# Patient Record
Sex: MALE | Race: WHITE | NOT HISPANIC OR LATINO | Employment: FULL TIME | URBAN - METROPOLITAN AREA
[De-identification: names, ages, dates, MRNs, and addresses within clinical notes are randomized per-mention and may not be internally consistent; named-entity substitution may affect disease eponyms.]

---

## 2017-01-30 ENCOUNTER — HOSPITAL ENCOUNTER (OUTPATIENT)
Dept: RADIOLOGY | Facility: HOSPITAL | Age: 37
Discharge: HOME | End: 2017-01-30
Attending: ORTHOPAEDIC SURGERY
Payer: COMMERCIAL

## 2017-01-30 DIAGNOSIS — M25.512 LEFT SHOULDER PAIN, UNSPECIFIED CHRONICITY: ICD-10-CM

## 2017-01-30 PROCEDURE — 73030 X-RAY EXAM OF SHOULDER: CPT | Mod: 26,LT,, | Performed by: RADIOLOGY

## 2017-01-30 PROCEDURE — 73030 X-RAY EXAM OF SHOULDER: CPT | Mod: TC,LT

## 2017-02-01 ENCOUNTER — OFFICE VISIT (OUTPATIENT)
Dept: ORTHOPEDICS | Facility: CLINIC | Age: 37
End: 2017-02-01
Payer: COMMERCIAL

## 2017-02-01 VITALS
WEIGHT: 172.81 LBS | HEART RATE: 90 BPM | DIASTOLIC BLOOD PRESSURE: 77 MMHG | SYSTOLIC BLOOD PRESSURE: 142 MMHG | HEIGHT: 69 IN | BODY MASS INDEX: 25.6 KG/M2 | RESPIRATION RATE: 16 BRPM

## 2017-02-01 DIAGNOSIS — M25.512 LEFT SHOULDER PAIN, UNSPECIFIED CHRONICITY: Primary | ICD-10-CM

## 2017-02-01 PROCEDURE — 20610 DRAIN/INJ JOINT/BURSA W/O US: CPT | Mod: LT,S$GLB,, | Performed by: PHYSICIAN ASSISTANT

## 2017-02-01 PROCEDURE — 99214 OFFICE O/P EST MOD 30 MIN: CPT | Mod: 25,S$GLB,, | Performed by: PHYSICIAN ASSISTANT

## 2017-02-01 PROCEDURE — 99999 PR PBB SHADOW E&M-EST. PATIENT-LVL III: CPT | Mod: PBBFAC,,, | Performed by: PHYSICIAN ASSISTANT

## 2017-02-01 RX ORDER — BETAMETHASONE SODIUM PHOSPHATE AND BETAMETHASONE ACETATE 3; 3 MG/ML; MG/ML
6 INJECTION, SUSPENSION INTRA-ARTICULAR; INTRALESIONAL; INTRAMUSCULAR; SOFT TISSUE
Status: COMPLETED | OUTPATIENT
Start: 2017-02-01 | End: 2017-02-01

## 2017-02-01 RX ADMIN — BETAMETHASONE SODIUM PHOSPHATE AND BETAMETHASONE ACETATE 6 MG: 3; 3 INJECTION, SUSPENSION INTRA-ARTICULAR; INTRALESIONAL; INTRAMUSCULAR; SOFT TISSUE at 04:02

## 2017-02-01 NOTE — PROGRESS NOTES
SUBJECTIVE:     Chief Complaint & History of Present Illness:  Juan Manuel Hdz is a  New  patient 36 y.o. male who is seen here today with a complaint of    Chief Complaint   Patient presents with    Left Shoulder - Pain, Injury    .  Patient reports she's developed some pain and soreness in the lateral aspect of his left shoulder over the past several weeks.  He does not remember a specific trauma or injury to the shoulder but does work out on a daily basis with both overhead work and resistive training.  He has not lost any specific function within the shoulder but does have tenderness and soreness particularly with movements and resisted range of motion over shoulder height  On a scale of 1-10, with 10 being worst pain imaginable, he rates this pain as 1 on good days and 4 on bad days.  he describes the pain as tender.    Review of patient's allergies indicates:  No Known Allergies      Current Outpatient Prescriptions   Medication Sig Dispense Refill    [START ON 2/7/2017] dextroamphetamine-amphetamine 10 mg Tab Take 10 mg by mouth 2 (two) times daily. 60 tablet 0     No current facility-administered medications for this visit.        Past Medical History   Diagnosis Date    ADHD (attention deficit hyperactivity disorder)     AR (allergic rhinitis)     History of psychiatric care     Psychiatric problem     Reactive airway disease     Therapy        Past Surgical History   Procedure Laterality Date    Closed reduction zygomatic arch fracture      Back surgery  2010       Vital Signs (Most Recent)  Vitals:    02/01/17 1617   BP: (!) 142/77   Pulse: 90   Resp: 16       Review of Systems:  ROS:  Constitutional: no fever or chills  Eyes: no visual changes  ENT: no nasal congestion or sore throat  Respiratory: no cough or shortness of breath  Cardiovascular: no chest pain or palpitations  Gastrointestinal: no nausea or vomiting, tolerating diet  Genitourinary: no hematuria or  "dysuria  Integument/Breast: no rash or pruritis  Hematologic/Lymphatic: no easy bruising or lymphadenopathy  Musculoskeletal: no arthralgias or myalgias  Neurological: no seizures or tremors  Behavioral/Psych: no auditory or visual hallucinations, ADHD  Endocrine: no heat or cold intolerance      OBJECTIVE:     PHYSICAL EXAM:  Height: 5' 9" (175.3 cm) Weight: 78.4 kg (172 lb 13.5 oz), General Appearance: Well nourished, well developed, in no acute distress.  Neurological: Mood & affect are normal.  Shoulder exam: left  Tenderness: posterior acromial  ROM: forward flexion 180/180, extension 45/45, full abduction 180/180, abduction-glenohumeral 90/90, external rotation 50/50  Shoulder Strength: biceps 5/5, triceps 5/5, abduction 5/5, adduction 5/5, external rotation 5/5 with shoulder at side, flexion 5/5, and extension 5/5  negative for tenderness about the glenohumeral joint, negative for tenderness over the acromioclavicular joint and negative for impingement sign  Stability tests: anterior apprehension test negative and posterior apprehension test negative  Special Tests:Bailey' test: negative, Cross-chest abduction: negative and Speed's test: negative                     RADIOGRAPHS:  X-rays taken today films reviewed by me demonstrate no evidence of fracture dislocation or other bony abnormality in or about the shoulder joint spaces are well-maintained no evidence of advanced degenerative joint disease    ASSESSMENT/PLAN:     Plan: We discussed with the patient at length all the different treatment options available for his leftshoulder including anti-inflammatories, acetaminophen, rest, ice, Physical therapy to include strengthening exercise, occasional cortisone injections for temporary relief, arthroscopic surgical repair, and finally shoulder arthroplasty.   Patient is opted to proceed with therapeutic diagnostic cortisone injection of left shoulder      The injection site was identified and the skin was " prepared with an ETOH solution. The    left  shoulder was injected with 1 ml of Celestone and 5 ml Lidocaine under sterile technique. Juan Manuelmikhail Rodriguez  tolerated the procedure well, he was advised to rest the  shoulder  today, ice and support. he did receive immediate relief of the pain in and about his  shoulder  he was told this would be short lived and is secondary to the lidocaine. he may have an increase in his discomfort tonight followed by steady improvement over the next several days. It may take 1-3 weeks following the injection to get the full benefit of the medication.  I will see him back in 3-6 months. Sooner if he has any problems or concerns.

## 2017-02-21 ENCOUNTER — OFFICE VISIT (OUTPATIENT)
Dept: ORTHOPEDICS | Facility: CLINIC | Age: 37
End: 2017-02-21
Payer: COMMERCIAL

## 2017-02-21 VITALS
RESPIRATION RATE: 16 BRPM | BODY MASS INDEX: 25.6 KG/M2 | DIASTOLIC BLOOD PRESSURE: 79 MMHG | HEART RATE: 64 BPM | HEIGHT: 69 IN | WEIGHT: 172.81 LBS | SYSTOLIC BLOOD PRESSURE: 133 MMHG

## 2017-02-21 DIAGNOSIS — M25.512 LEFT SHOULDER PAIN, UNSPECIFIED CHRONICITY: Primary | ICD-10-CM

## 2017-02-21 PROCEDURE — 99213 OFFICE O/P EST LOW 20 MIN: CPT | Mod: S$GLB,,, | Performed by: PHYSICIAN ASSISTANT

## 2017-02-21 PROCEDURE — 99999 PR PBB SHADOW E&M-EST. PATIENT-LVL III: CPT | Mod: PBBFAC,,, | Performed by: PHYSICIAN ASSISTANT

## 2017-02-21 PROCEDURE — 1160F RVW MEDS BY RX/DR IN RCRD: CPT | Mod: S$GLB,,, | Performed by: PHYSICIAN ASSISTANT

## 2017-02-21 RX ORDER — TRAMADOL HYDROCHLORIDE 50 MG/1
50 TABLET ORAL
Qty: 60 TABLET | Refills: 0 | Status: SHIPPED | OUTPATIENT
Start: 2017-02-21 | End: 2017-03-03

## 2017-02-21 NOTE — PROGRESS NOTES
SUBJECTIVE:     Chief Complaint & History of Present Illness:  Juan Manuel Hdz is a  Established  patient 36 y.o. male who is seen here today with a complaint of    Chief Complaint   Patient presents with    Left Shoulder - Follow-up, Pain    .  He is a patient well known to me was last seen treated in the clinic 2/01/2017 at which time he did undergone a therapeutic diagnostic cortisone injection of the left shoulder.  Patient reports he only received 1 week of relief from the injection but has had a return of symptoms to the same degree if not greater than prior to the injection  On a scale of 1-10, with 10 being worst pain imaginable, he rates this pain as 6 on good days and 8 on bad days.  he describes the pain as tender and sore.    Review of patient's allergies indicates:  No Known Allergies      Current Outpatient Prescriptions   Medication Sig Dispense Refill    dextroamphetamine-amphetamine 10 mg Tab Take 10 mg by mouth 2 (two) times daily. 60 tablet 0    tramadol (ULTRAM) 50 mg tablet Take 1 tablet (50 mg total) by mouth every 4 to 6 hours as needed for Pain. 60 tablet 0     No current facility-administered medications for this visit.        Past Medical History   Diagnosis Date    ADHD (attention deficit hyperactivity disorder)     AR (allergic rhinitis)     History of psychiatric care     Psychiatric problem     Reactive airway disease     Therapy        Past Surgical History   Procedure Laterality Date    Closed reduction zygomatic arch fracture      Back surgery  2010       Vital Signs (Most Recent)  Vitals:    02/21/17 1113   BP: 133/79   Pulse: 64   Resp: 16       Review of Systems:  ROS:  Constitutional: no fever or chills  Eyes: no visual changes  ENT: no nasal congestion or sore throat  Respiratory: no cough or shortness of breath  Cardiovascular: no chest pain or palpitations  Gastrointestinal: no nausea or vomiting, tolerating diet  Genitourinary: no hematuria or  "dysuria  Integument/Breast: no rash or pruritis  Hematologic/Lymphatic: no easy bruising or lymphadenopathy  Musculoskeletal: no arthralgias or myalgias  Neurological: no seizures or tremors  Behavioral/Psych: no auditory or visual hallucinations, ADHD  Endocrine: no heat or cold intolerance      OBJECTIVE:     PHYSICAL EXAM:  Height: 5' 9" (175.3 cm) Weight: 78.4 kg (172 lb 13.5 oz), General Appearance: Well nourished, well developed, in no acute distress.  Neurological: Mood & affect are normal.  Shoulder exam: left  Tenderness: posterior acromial  ROM: forward flexion 180/180 pain at the ends of range of motion, extension 45/45, full abduction 180/180 pain ends of range of motion, abduction-glenohumeral 90/90, external rotation 50/50  Shoulder Strength: biceps 5/5, triceps 5/5, abduction 5/5, adduction 5/5, external rotation 5/5 with shoulder at side, flexion 5/5, and extension 5/5  negative for tenderness about the glenohumeral joint, negative for tenderness over the acromioclavicular joint and negative for impingement sign  Stability tests: anterior apprehension test negative and posterior apprehension test negative                     ASSESSMENT/PLAN:     Plan: We discussed with the patient at length all the different treatment options available for his leftshoulder including anti-inflammatories, acetaminophen, rest, ice, Physical therapy to include strengthening exercise, occasional cortisone injections for temporary relief, arthroscopic surgical repair, and finally shoulder arthroplasty.   In light of the failed injection therapy we'll proceed with MRI of the shoulder we'll see him back following the MRI to discuss treatment options    "

## 2017-02-27 ENCOUNTER — HOSPITAL ENCOUNTER (OUTPATIENT)
Dept: RADIOLOGY | Facility: HOSPITAL | Age: 37
Discharge: HOME | End: 2017-02-27
Attending: ORTHOPAEDIC SURGERY
Payer: COMMERCIAL

## 2017-02-27 DIAGNOSIS — M25.512 LEFT SHOULDER PAIN, UNSPECIFIED CHRONICITY: ICD-10-CM

## 2017-02-27 PROCEDURE — 73221 MRI JOINT UPR EXTREM W/O DYE: CPT | Mod: TC,LT

## 2017-02-27 PROCEDURE — 73221 MRI JOINT UPR EXTREM W/O DYE: CPT | Mod: 26,LT,, | Performed by: RADIOLOGY

## 2017-03-01 ENCOUNTER — OFFICE VISIT (OUTPATIENT)
Dept: ORTHOPEDICS | Facility: CLINIC | Age: 37
End: 2017-03-01
Payer: COMMERCIAL

## 2017-03-01 VITALS
HEIGHT: 69 IN | RESPIRATION RATE: 16 BRPM | SYSTOLIC BLOOD PRESSURE: 129 MMHG | BODY MASS INDEX: 25.6 KG/M2 | DIASTOLIC BLOOD PRESSURE: 73 MMHG | WEIGHT: 172.81 LBS | HEART RATE: 68 BPM

## 2017-03-01 DIAGNOSIS — M25.512 LEFT SHOULDER PAIN, UNSPECIFIED CHRONICITY: Primary | ICD-10-CM

## 2017-03-01 PROCEDURE — 99213 OFFICE O/P EST LOW 20 MIN: CPT | Mod: S$GLB,,, | Performed by: PHYSICIAN ASSISTANT

## 2017-03-01 PROCEDURE — 99999 PR PBB SHADOW E&M-EST. PATIENT-LVL III: CPT | Mod: PBBFAC,,, | Performed by: PHYSICIAN ASSISTANT

## 2017-03-01 PROCEDURE — 1160F RVW MEDS BY RX/DR IN RCRD: CPT | Mod: S$GLB,,, | Performed by: PHYSICIAN ASSISTANT

## 2017-03-01 NOTE — PROGRESS NOTES
SUBJECTIVE:     Chief Complaint & History of Present Illness:  Juan Manuel Hdz is a  Established  patient 36 y.o. male who is seen here today with a complaint of    Chief Complaint   Patient presents with    Left Shoulder - Follow-up    .  His patient well known to me was last seen treated in the clinic 2/21/2017 at which time we had sent him for MRI to evaluate his continuing left shoulder pain.  He had failed cortisone injections prior to this area today review his MRI results and discuss treatment options  On a scale of 1-10, with 10 being worst pain imaginable, he rates this pain as 1 on good days and 5 on bad days.  he describes the pain as sore and achy.    Review of patient's allergies indicates:  No Known Allergies      Current Outpatient Prescriptions   Medication Sig Dispense Refill    dextroamphetamine-amphetamine 10 mg Tab Take 10 mg by mouth 2 (two) times daily. 60 tablet 0    tramadol (ULTRAM) 50 mg tablet Take 1 tablet (50 mg total) by mouth every 4 to 6 hours as needed for Pain. 60 tablet 0     No current facility-administered medications for this visit.        Past Medical History:   Diagnosis Date    ADHD (attention deficit hyperactivity disorder)     AR (allergic rhinitis)     History of psychiatric care     Psychiatric problem     Reactive airway disease     Therapy        Past Surgical History:   Procedure Laterality Date    BACK SURGERY  2010    CLOSED REDUCTION ZYGOMATIC ARCH FRACTURE         Vital Signs (Most Recent)  Vitals:    03/01/17 1131   BP: 129/73   Pulse: 68   Resp: 16       Review of Systems:  ROS:  Constitutional: no fever or chills  Eyes: no visual changes  ENT: no nasal congestion or sore throat  Respiratory: no cough or shortness of breath  Cardiovascular: no chest pain or palpitations  Gastrointestinal: no nausea or vomiting, tolerating diet  Genitourinary: no hematuria or dysuria  Integument/Breast: no rash or pruritis  Hematologic/Lymphatic: no easy  "bruising or lymphadenopathy  Musculoskeletal: no arthralgias or myalgias  Neurological: no seizures or tremors  Behavioral/Psych: no auditory or visual hallucinations, ADHD  Endocrine: no heat or cold intolerance         OBJECTIVE:     PHYSICAL EXAM:  Height: 5' 9" (175.3 cm) Weight: 78.4 kg (172 lb 13.5 oz), General Appearance: Well nourished, well developed, in no acute distress.  Neurological: Mood & affect are normal.  Shoulder exam: left  Tenderness: posterior acromial  ROM: forward flexion 180/180 pain at the ends of range of motion, extension 45/45, full abduction 180/180 pain ends of range of motion, abduction-glenohumeral 90/90, external rotation 50/50  Shoulder Strength: biceps 5/5, triceps 5/5, abduction 5/5, adduction 5/5, external rotation 5/5 with shoulder at side, flexion 5/5, and extension 5/5  negative for tenderness about the glenohumeral joint, negative for tenderness over the acromioclavicular joint and negative for impingement sign  Stability tests: anterior apprehension test negative and posterior apprehension test negative                   RADIOGRAPHS:  MRI demonstrates  Mild periarticular osseous edema about the AC joint.  Normal MRI evaluation of the rotator cuff and labrum.        ASSESSMENT/PLAN:     Plan: We discussed with the patient at length all the different treatment options available for his leftshoulder including anti-inflammatories, acetaminophen, rest, ice, Physical therapy to include strengthening exercise, occasional cortisone injections for temporary relief, arthroscopic surgical repair, and finally shoulder arthroplasty.   We'll proceed with the physical therapy of the left shoulder to include dry needling  I will see him back in 3 weeks to assess his progress in this modality he continues to have pain we'll consider repeat cortisone injection at that time    "

## 2017-03-08 ENCOUNTER — CLINICAL SUPPORT (OUTPATIENT)
Dept: REHABILITATION | Facility: HOSPITAL | Age: 37
End: 2017-03-08
Attending: ORTHOPAEDIC SURGERY
Payer: COMMERCIAL

## 2017-03-08 DIAGNOSIS — M25.512 ACUTE PAIN OF LEFT SHOULDER: Primary | ICD-10-CM

## 2017-03-08 PROCEDURE — 97161 PT EVAL LOW COMPLEX 20 MIN: CPT

## 2017-03-08 PROCEDURE — 97110 THERAPEUTIC EXERCISES: CPT

## 2017-03-08 NOTE — PROGRESS NOTES
[facility]   PATIENT EVALUATION    Date: 03/08/2017  Start Time: 11:30  Stop Time: 12:30    Patient Name: Juan Manuel Hdz  Clinic Number: [id number]  Age: 36 y.o.  Gender: male    Diagnosis:   Encounter Diagnosis   Name Primary?    Acute pain of left shoulder Yes       Referring Physician: Andrea Gleason MD  Treatment Orders: PT Eval and Treat      History     Past Medical History:   Diagnosis Date    ADHD (attention deficit hyperactivity disorder)     AR (allergic rhinitis)     History of psychiatric care     Psychiatric problem     Reactive airway disease     Therapy        Current Outpatient Prescriptions   Medication Sig    dextroamphetamine-amphetamine 10 mg Tab Take 10 mg by mouth 2 (two) times daily.     No current facility-administered medications for this visit.        Review of patient's allergies indicates:  No Known Allergies      Subjective     History of Present Condition: Pt reports that over the last 1.5 months he began with shoulder pain during lifting and overhead movements.     Onset Date: 1.5 mo  Date of Surgery: NA  Precautions: NA    Mechanism of Injury: insidious    Pain Location: shoulder   Pain Description: Sharp  Current Pain: 0/10  Least Pain: 0/10  Worst Pain: 8/10  Aggravating Factors: overhead motion, lifting, leaning on elbow  Relieving Factors: rest    Diagnostic Tests: MRI: Mild periarticular osseous edema about the AC joint.    Normal MRI evaluation of the rotator cuff and labrum.  Prior Therapy: Yes - none for shoulder    Occupation: drafting - works at desk    Sports/Recreational Activities: lifting, running  Extremity Dominance: R    Prior Level of Function: Independent  Functional Deficits Leading to Referral/Nature of Injury: pain with ADLs  Patient Therapy Goals: return to lifting pain free  Cultural/Environmental/Spiritual Barriers to Treatment or Learning: none      Objective     Observation: FHP, rounded shoulders  increased thoracic kyphosis    Palpation: mild TTP bicep tendon    Shoulder Range of Motion: ROM WNL B; B tightness of posterior capsule     Joint Mobility: normal    Strength:     Left:  Flex: 5.5  Abd: 3/5!  IR: 3/5!  ER: 4/5!    Right:  Flex: 5/5  Abd: 5/5  IR: 5/5  ER: 5/5    Scapular Control/Dyskinesis: + inferior angle winging    Special Tests: =  + Neers   + Speed      Other:       Treatment:     pec stretch over towel roll x 4'  IR?ER 3 x 10  No money 3 x 10  Scap retractions 3 x 10 x 10 sec    Functional Limitations Reports - G Codes  Category: Mobility  Tool: FOTO  Score: 53       Assessment     History  Co-morbidities and personal factors that may impact the plan of care Low    Stable Clinical Presentation   Co-morbidities:       Personal Factors:     Body regions    Body systems    Activity Limitations    Participation Restrictons   No personal factors and/ or  comorbidites          Address 1-2 elements:   Pain  Strength         This is a 36 y.o. male referred to outpatient physical therapy and presents with a medical diagnosis of left shoulder pain and demonstrates limitations as described in the problem list. Pt demonstrates good rehab potential. Pt will benefit from physcial therapy services in order to maximize pain free and/or functional use of left shoulder. The following goals were discussed with the patient and patient is in agreement with them as to be addressed in the treatment plan. Pt was given a HEP consisting of     pec stretch over towel roll x 4'  IR?ER 3 x 10  No money 3 x 10  Scap retractions 3 x 10 x 10 sec    . Pt verbally understood the instructions as they were given and demonstrated proper form and technique during therapy. Pt was advised to perform these exercises free of pain, and to stop performing them if pain occurs.     Medical necessity is demonstrated by the following problem list:   - Pain limits function of effected part for all activities  - Unable to participate in  daily activities   - Requires skilled supervision to complete and progress HEP  - Fall risk - impaired balance   - Continued inability to participate in vocational pursuits    Short Term Goals (5-6 Weeks):  - Pt will increase shoulder ER strength to 4/5  - Decrease Pain to 0 with lifting arm overhead  - Pt to self correct posture with scap retractions  - Pt independent with HEP with progressions.      Long Term Goals (10-12 Weeks):  - Pt will increase shoulder ER strength to 5/5  - Decrease Pain to 0 with lifting arm overhead with 10#  - Pt to improve FOTO score to > 72  - Pt to return to weightlifting with no increase in s.s      Plan     Pt will be treated by physical therapy 1-2 times a week for 10-12 weeks for manual therapy, therapeutic exercise, home exercise program, patient education, and modalities PRN to achieve established goals. Juan Manuel may at times be seen by a PTA as part of the Rehab Team.     Anel Block, PT, DPT  03/08/2017    I CERTIFY THE NEED FOR THESE SERVICES FURNISHED UNDER THIS PLAN OF TREATMENT AND WHILE UNDER MY CARE    Physician's comments: ________________________________________________________________________________________________________________________________________________    Physician's Name: ___________________________________

## 2017-03-13 ENCOUNTER — CLINICAL SUPPORT (OUTPATIENT)
Dept: REHABILITATION | Facility: HOSPITAL | Age: 37
End: 2017-03-13
Attending: ORTHOPAEDIC SURGERY
Payer: COMMERCIAL

## 2017-03-13 DIAGNOSIS — R53.1 WEAKNESS: Primary | ICD-10-CM

## 2017-03-13 PROCEDURE — 97140 MANUAL THERAPY 1/> REGIONS: CPT | Performed by: PHYSICAL THERAPIST

## 2017-03-13 PROCEDURE — 97110 THERAPEUTIC EXERCISES: CPT | Performed by: PHYSICAL THERAPIST

## 2017-03-13 NOTE — PROGRESS NOTES
" Outpatient Physical Therapy Progress Note     Time in: 11:00  Time out: 11:50  Ther ex time: 30 min    Visit # 2    Subjective:  Pt states L shld feels "OK" and rates pain as 0/10.  States pain only with certain movements.  States doing HEP as instructed.    Objective: Tight posterior capsule.  TTP biceps and middle deltoid muscles.    Treatment:  There ex and modalities for increased ROM/strength and improved ADL to include:  UBE x 6 min, theraband x 3 ways, theraband IR/ER side steps, prone rows with 5#, serratus punches with 5#, SL ER and HABD with 2#, PROM/stretching x 7 min, HEP review and DN to trigger points in lateral deltoid/biceps muscles.    Assessment:  No pain with exercise. Sx decreased after DN.     Will the patient continue to benefit from skilled PT intervention?     yes    Medical necessity is demonstrated by:   Pain limits function of effected part for all activities  Unable to participate fully in daily activities       Progress towards goals: fair    New/Revised Goals:    Plan:  Continue with established Plan of Care toward PT goals.          "

## 2017-03-20 ENCOUNTER — CLINICAL SUPPORT (OUTPATIENT)
Dept: REHABILITATION | Facility: HOSPITAL | Age: 37
End: 2017-03-20
Attending: ORTHOPAEDIC SURGERY
Payer: COMMERCIAL

## 2017-03-20 DIAGNOSIS — R52 PAIN: Primary | ICD-10-CM

## 2017-03-20 PROCEDURE — 97140 MANUAL THERAPY 1/> REGIONS: CPT | Performed by: PHYSICAL THERAPIST

## 2017-03-20 PROCEDURE — 97110 THERAPEUTIC EXERCISES: CPT | Performed by: PHYSICAL THERAPIST

## 2017-03-20 NOTE — PROGRESS NOTES
" Outpatient Physical Therapy Progress Note     Time in: 11:00  Time out: 11:50  Ther ex time: 30 min    Visit # 3    Subjective:  Pt states L shld feels "about the same" and rates pain as 0/10.  States doing HEP as instructed.    Objective: Tight posterior capsule.  TTP middle deltoid muscle.    Treatment:  There ex and modalities for increased ROM/strength and improved ADL to include:  UBE x 6 min, theraband x 3 ways, theraband IR/ER side steps, prone rows with 5#, serratus punches with 5#, SL ER and HABD with 2#, PROM/stretching x 8 min, HEP review and DN to trigger point in lateral deltoid.    Assessment:  No pain with exercise. Sx decreased after DN with palpation.     Will the patient continue to benefit from skilled PT intervention?     yes    Medical necessity is demonstrated by:   Pain limits function of effected part for all activities  Unable to participate fully in daily activities       Progress towards goals: fair    New/Revised Goals:    Plan:  Continue with established Plan of Care toward PT goals.          "

## 2017-03-23 ENCOUNTER — CLINICAL SUPPORT (OUTPATIENT)
Dept: REHABILITATION | Facility: HOSPITAL | Age: 37
End: 2017-03-23
Attending: ORTHOPAEDIC SURGERY
Payer: COMMERCIAL

## 2017-03-23 PROCEDURE — 97140 MANUAL THERAPY 1/> REGIONS: CPT

## 2017-03-23 PROCEDURE — 97110 THERAPEUTIC EXERCISES: CPT

## 2017-03-23 NOTE — PROGRESS NOTES
" Outpatient Physical Therapy Progress Note     Time in: 1230  Time out: 1330  Ther ex time: 40 min    Visit # 4    Subjective:  Pt reporting no pain when arrived for tx. Pain scale 0/10. Stated occasional pain L shld with "certain movements".  States doing HEP as instructed.    Objective: Tight posterior capsule.  TTP middle deltoid muscle.    Treatment:  There ex and modalities for increased ROM/strength and improved ADL to include:  UBE x 8 min, GTB x 4 ways, GTB IR/ER side steps, prone rows with 5#, serratus punches with 5#, SL ER and HABD with 2#, Pulleys -shld flex and scaption 30x  PROM/stretching x 10 min, HEP review and CP x 10    Assessment: Pt tolerating tx well. Min L shld mm fatigue after tx but w/o increased pain. VC/TC for correcting form/technique with exercise along with scapular engagement.      Will the patient continue to benefit from skilled PT intervention?     yes    Medical necessity is demonstrated by:   Pain limits function of effected part for all activities  Unable to participate fully in daily activities       Progress towards goals: fair    New/Revised Goals:    Plan:  Continue with established Plan of Care toward PT goals.          "

## 2017-03-27 ENCOUNTER — CLINICAL SUPPORT (OUTPATIENT)
Dept: REHABILITATION | Facility: HOSPITAL | Age: 37
End: 2017-03-27
Attending: ORTHOPAEDIC SURGERY
Payer: COMMERCIAL

## 2017-03-27 DIAGNOSIS — R53.1 WEAKNESS: Primary | ICD-10-CM

## 2017-03-27 PROCEDURE — 97110 THERAPEUTIC EXERCISES: CPT | Performed by: PHYSICAL THERAPIST

## 2017-03-30 ENCOUNTER — CLINICAL SUPPORT (OUTPATIENT)
Dept: REHABILITATION | Facility: HOSPITAL | Age: 37
End: 2017-03-30
Attending: ORTHOPAEDIC SURGERY
Payer: COMMERCIAL

## 2017-03-30 DIAGNOSIS — R53.1 WEAKNESS: Primary | ICD-10-CM

## 2017-03-30 PROCEDURE — 97140 MANUAL THERAPY 1/> REGIONS: CPT

## 2017-03-30 PROCEDURE — 97110 THERAPEUTIC EXERCISES: CPT

## 2017-03-30 NOTE — PROGRESS NOTES
" Outpatient Physical Therapy Progress Note     Time in: 1030  Time out: 1130  Ther ex time: 35 min    Visit # 6    Subjective:  Pt states L shld pain with "occasional movements" and rates pain as 5/10.  States doing HEP as instructed. L shld pain scale after tx 2/10.    Objective: Tight posterior capsule.  TTP middle deltoid muscle.    Treatment:  There ex and modalities for increased ROM/strength and improved ADL to include:  UBE x 8 min, Pulleys,  theraband x 3 ways, theraband IR/ER side steps, prone rows with 5#, serratus punches with 5#, prone ext 90-0 with 3#, theraband clock, ER ball drop/catch, wall slides flex and abd, wall walks with theraband np, manual co-contractions 2 min, SL ER and HABD with 2# np, PROM/stretching/DTM-cross friction bicep tendon x 15 min, HEP review and CP x 10 min.    Assessment:  Pt tolerating tx well. Increased pain with DTM L bicep tendon but with noted decreased pain after tx. VC/TC for correcting form/technique with exercise      Will the patient continue to benefit from skilled PT intervention?     yes    Medical necessity is demonstrated by:   Pain limits function of effected part for all activities  Unable to participate fully in daily activities       Progress towards goals: fair    New/Revised Goals:    Plan:  Continue with established Plan of Care toward PT goals.          "

## 2017-04-03 ENCOUNTER — CLINICAL SUPPORT (OUTPATIENT)
Dept: REHABILITATION | Facility: HOSPITAL | Age: 37
End: 2017-04-03
Attending: ORTHOPAEDIC SURGERY
Payer: COMMERCIAL

## 2017-04-03 DIAGNOSIS — R52 PAIN: Primary | ICD-10-CM

## 2017-04-03 PROCEDURE — 97110 THERAPEUTIC EXERCISES: CPT | Performed by: PHYSICAL THERAPIST

## 2017-04-03 PROCEDURE — 97140 MANUAL THERAPY 1/> REGIONS: CPT | Performed by: PHYSICAL THERAPIST

## 2017-04-03 NOTE — PROGRESS NOTES
" Outpatient Physical Therapy Progress Note     Time in: 11:00  Time out: 11:55  Ther ex time: 40 min    Visit # 7    Subjective:  Pt states L shld feels "about the same" and rates pain as 0/10.  States doing HEP as instructed.    Objective: Tight posterior capsule.  TTP middle deltoid muscle.    Treatment:  There ex and modalities for increased ROM/strength and improved ADL to include:  UBE x 6 min, theraband x 3 ways, theraband IR/ER side steps, prone rows with 5#, serratus punches with 5#, prone ext 90-0 with 3#, theraband clock, ER ball drop/catch, wall walks with theraband, manual co-contractions, SL ER and HABD with 2#, PROM/stretching x 5 min, DN middle deltoid trigger point, kinesio tape for L biceps inhibition with i strip and 30% tension and HEP review.    Assessment:  No pain with treatment,     Will the patient continue to benefit from skilled PT intervention?     yes    Medical necessity is demonstrated by:   Pain limits function of effected part for all activities  Unable to participate fully in daily activities       Progress towards goals: fair    New/Revised Goals:    Plan:  Continue with established Plan of Care toward PT goals.          "

## 2017-04-06 ENCOUNTER — CLINICAL SUPPORT (OUTPATIENT)
Dept: REHABILITATION | Facility: HOSPITAL | Age: 37
End: 2017-04-06
Attending: ORTHOPAEDIC SURGERY
Payer: COMMERCIAL

## 2017-04-06 ENCOUNTER — OFFICE VISIT (OUTPATIENT)
Dept: PSYCHIATRY | Facility: CLINIC | Age: 37
End: 2017-04-06
Payer: COMMERCIAL

## 2017-04-06 VITALS — BODY MASS INDEX: 26.36 KG/M2 | HEIGHT: 69 IN | RESPIRATION RATE: 15 BRPM | WEIGHT: 178 LBS

## 2017-04-06 DIAGNOSIS — R52 PAIN: Primary | ICD-10-CM

## 2017-04-06 DIAGNOSIS — F90.0 ATTENTION DEFICIT HYPERACTIVITY DISORDER (ADHD), PREDOMINANTLY INATTENTIVE TYPE: ICD-10-CM

## 2017-04-06 PROCEDURE — 1160F RVW MEDS BY RX/DR IN RCRD: CPT | Mod: S$GLB,,, | Performed by: PSYCHIATRY & NEUROLOGY

## 2017-04-06 PROCEDURE — 99214 OFFICE O/P EST MOD 30 MIN: CPT | Mod: S$GLB,,, | Performed by: PSYCHIATRY & NEUROLOGY

## 2017-04-06 PROCEDURE — 97110 THERAPEUTIC EXERCISES: CPT

## 2017-04-06 PROCEDURE — 97140 MANUAL THERAPY 1/> REGIONS: CPT

## 2017-04-06 PROCEDURE — 99999 PR PBB SHADOW E&M-EST. PATIENT-LVL III: CPT | Mod: PBBFAC,,, | Performed by: PSYCHIATRY & NEUROLOGY

## 2017-04-06 RX ORDER — DEXTROAMPHETAMINE SACCHARATE, AMPHETAMINE ASPARTATE, DEXTROAMPHETAMINE SULFATE AND AMPHETAMINE SULFATE 2.5; 2.5; 2.5; 2.5 MG/1; MG/1; MG/1; MG/1
10 TABLET ORAL 2 TIMES DAILY
Qty: 60 TABLET | Refills: 0 | Status: SHIPPED | OUTPATIENT
Start: 2017-04-06 | End: 2017-04-06 | Stop reason: SDUPTHER

## 2017-04-06 RX ORDER — DEXTROAMPHETAMINE SACCHARATE, AMPHETAMINE ASPARTATE, DEXTROAMPHETAMINE SULFATE AND AMPHETAMINE SULFATE 2.5; 2.5; 2.5; 2.5 MG/1; MG/1; MG/1; MG/1
10 TABLET ORAL 2 TIMES DAILY
Qty: 60 TABLET | Refills: 0 | Status: SHIPPED | OUTPATIENT
Start: 2017-05-06 | End: 2017-04-06 | Stop reason: SDUPTHER

## 2017-04-06 RX ORDER — DEXTROAMPHETAMINE SACCHARATE, AMPHETAMINE ASPARTATE, DEXTROAMPHETAMINE SULFATE AND AMPHETAMINE SULFATE 2.5; 2.5; 2.5; 2.5 MG/1; MG/1; MG/1; MG/1
10 TABLET ORAL 2 TIMES DAILY
Qty: 60 TABLET | Refills: 0 | Status: SHIPPED | OUTPATIENT
Start: 2017-06-06 | End: 2018-02-08 | Stop reason: SDUPTHER

## 2017-04-06 NOTE — PROGRESS NOTES
" Outpatient Physical Therapy Progress Note     Time in: 1240  Time out: 1330  Ther ex time: 40 min    Visit # 8    Subjective:  Pt reporting no pain when arriving for tx but c/o pain L UE shld abd > 90' with ER. Pain scale 0/10.  States doing HEP as instructed.    Objective: Tight posterior capsule.  TTP middle deltoid muscle.    Treatment:  There ex and modalities for increased ROM/strength and improved ADL to include:  UBE x 6 min pro/retract, PROM/stretching x 10 min, L UE sleeper stretch 3x/30", BTB x 3 ways,  BTB ER/IR, BTB IR/ER side steps,  BTB truck ext L shld ext, prone rows with 5#, serratus punches with 5#, prone ext 90-0 with 3#, theraband clock, ER ball drop/catch, wall walks with theraband, manual co-contractions, SL ER and HABD with 2#, PROM/stretching x 5 min, and CP x 10.    Assessment:  Pt tolerating tx well. No pain during tx.  VC/TC for correcting form/technique along with scapular engagement and stability.      Will the patient continue to benefit from skilled PT intervention?     yes    Medical necessity is demonstrated by:   Pain limits function of effected part for all activities  Unable to participate fully in daily activities       Progress towards goals: fair    New/Revised Goals:    Plan:  Continue with established Plan of Care toward PT goals.          "

## 2017-04-06 NOTE — PROGRESS NOTES
ESTABLISHED OUTPATIENT VISIT   E/M LEVEL 4: [id number]    ENCOUNTER DATE: 2017  SITE: [facility], [facility]      HISTORY    CHIEF COMPLAINT   Juan Manuel Hdz is a 36 y.o. male who presents for followup of   Chief Complaint   Patient presents with    Inattention    Distractibilty       HPI   (Elements: Location, Quality, Severity, Duration, Timing, Content, Modifying Factors, Associated Signs & Symptoms)    Patient with history of ADHD, here for disease maintenance.  He is here for follow up appointment.  He was last seen in dec 2016 after long hiatus - off adderall with worsening distraction, procrastination at work.  We restarted adderall.  He filled 1 month but then got distracted and didn't refill timely, then prescriptions .  So he is off adderall again at this time.  No problems when on adderall - no cardiac issues, depression, insomnia, appetite/weight loss, tics/tremors.  No change in alcohol use - remains social.  Work is fine.  He recently suffered a breakup and was upset for sometime - but now over this.  He has had some musculoskeletal issues and is in physical rehab.    16 - off adderall for some time, worsening procrastination/distraction at work, adderall restarted 10mg bid  10/8/15 - stable, no med changes  7/10/15 - seen by resident, stable, no med changes  14 - stable, no med changes    ROS   Cardiovascular ROS: negative for - chest pain/palpitations   Respiratory ROS: no shortness of breath  Musculoskeletal ROS: shoulder pain      PFSH  Past Medical History reviewed: Yes  Family History reviewed: Yes  Social History reviewed: No      PSYCHOTROPIC MEDICATIONS   Adderall 10mg bid - currently out    EXAMINATION    VITALS   Most recent vital signs, dated less than 90 days prior to this appointment, were reviewed.   Vitals:    17 1746   Resp: 15       CONSTITUTIONAL  General Appearance: stated age, casually dressed    MUSCULOSKELETAL  Muscle Strength and  "Tone: no weakness or spasticity  Abnormal Involuntary Movements: no tremor noted  Gait and Station: ambulates without assistance    PSYCHIATRIC   Level of Consciousness: alert  Orientation: to person, place, time  Grooming: well groomed  Psychomotor Behavior: no retardation or agitation  Speech: conversational  Language: fluent english  Mood: "fine"  Affect: euthymic, pleasant, reactive  Thought Process: linear  Associations: intact, no loosening of associations  Thought Content: no SI  Memory: intact  Attention: mild distractibility noted  Fund of Knowledge: intact  Insight: intact  Judgment: intact    MEDICAL DECISION MAKING    DIAGNOSES  ADHD    PROBLEM LIST AND MANAGEMENT PLANS    New problem(s) (3 points each):   - none    Established problem(s), worsening (2 points each):   - none    Established problem(s), stable/improved (1 point each):  - ADHD.  restart Adderall at previous dose.  [place] monitoring site checked - no refills in past year.  - palpitations.  An issue in fall 2013 but not currently.  Cardiology cleared him to take stimulants.  QTc wnl.  - work/relationship problems.  More intensive psychotherapy could help address this - psychoeducation provided.  We did discuss during session.        PRESCRIPTION DRUG MANAGEMENT  Compliance: currently taking meds as prescribed  Side Effects: none  Regimen Adjustments: cont Adderall 10mg bid      DIAGNOSTIC TESTING  EKG 11/13/13 - QTc 397    David Kelly"

## 2017-05-15 ENCOUNTER — TELEPHONE (OUTPATIENT)
Dept: DERMATOLOGY | Facility: CLINIC | Age: 37
End: 2017-05-15

## 2017-05-15 NOTE — TELEPHONE ENCOUNTER
----- Message from Elicia Hernandez sent at 5/15/2017 11:33 AM CDT -----  Contact: Self  Pt is trying to get in to see a Dermatologist on [facility].  His face is red and itchy.  First available on [facility] is July.  He has never had this before.  Please call him at [phone].

## 2017-05-30 ENCOUNTER — OFFICE VISIT (OUTPATIENT)
Dept: DERMATOLOGY | Facility: CLINIC | Age: 37
End: 2017-05-30
Payer: COMMERCIAL

## 2017-05-30 DIAGNOSIS — L21.9 SEBORRHEIC DERMATITIS: Primary | ICD-10-CM

## 2017-05-30 PROCEDURE — 99999 PR PBB SHADOW E&M-EST. PATIENT-LVL II: CPT | Mod: PBBFAC,,, | Performed by: DERMATOLOGY

## 2017-05-30 PROCEDURE — 99202 OFFICE O/P NEW SF 15 MIN: CPT | Mod: S$GLB,,, | Performed by: DERMATOLOGY

## 2017-05-30 RX ORDER — FLUCONAZOLE 200 MG/1
TABLET ORAL
Qty: 8 TABLET | Refills: 0 | Status: SHIPPED | OUTPATIENT
Start: 2017-05-30 | End: 2018-05-30

## 2017-05-30 RX ORDER — HYDROCORTISONE BUTYRATE 1 MG/ML
LOTION TOPICAL
Qty: 1 BOTTLE | Refills: 3 | Status: SHIPPED | OUTPATIENT
Start: 2017-05-30 | End: 2018-05-30

## 2017-10-19 ENCOUNTER — OFFICE VISIT (OUTPATIENT)
Dept: PSYCHIATRY | Facility: CLINIC | Age: 37
End: 2017-10-19
Payer: COMMERCIAL

## 2017-10-19 VITALS
HEIGHT: 69 IN | DIASTOLIC BLOOD PRESSURE: 68 MMHG | HEART RATE: 48 BPM | SYSTOLIC BLOOD PRESSURE: 141 MMHG | BODY MASS INDEX: 27.01 KG/M2 | WEIGHT: 182.38 LBS

## 2017-10-19 DIAGNOSIS — F90.0 ATTENTION DEFICIT HYPERACTIVITY DISORDER (ADHD), PREDOMINANTLY INATTENTIVE TYPE: Primary | ICD-10-CM

## 2017-10-19 PROCEDURE — 99999 PR PBB SHADOW E&M-EST. PATIENT-LVL II: CPT | Mod: PBBFAC,,, | Performed by: NURSE PRACTITIONER

## 2017-10-19 PROCEDURE — 99213 OFFICE O/P EST LOW 20 MIN: CPT | Mod: S$GLB,,, | Performed by: NURSE PRACTITIONER

## 2017-10-19 NOTE — PROGRESS NOTES
Outpatient Psychiatry Follow-Up Visit (MD/NP)    10/19/2017    Clinical Status of Patient:  Outpatient (Ambulatory)    Chief Complaint:  Juan Manuel Hdz is a 36 y.o. male who presents today for follow-up of attention problems.  Met with patient.      Interval History and Content of Current Session:  Interim Events/Subjective Report/Content of Current Session: Mr Hdz arrived on time for his appointment.  He was appropriately dressed.  Appearance neat and clean.  He stated that he had run out of his medications and was having difficulty with focus lately.  He was last seen in April 2017 by Dr Kelly.  Medication working well to control focus and distractibility.  No c/o side effects.  No stressors currently.  Mood good.  Denies SI/HI, denies A/V hallucinations.      Psychotherapy:  · Target symptoms: distractability, lack of focus  · Why chosen therapy is appropriate versus another modality: relevant to diagnosis, patient responds to this modality, evidence based practice  · Outcome monitoring methods: self-report, observation  · Therapeutic intervention type: supportive psychotherapy  · Topics discussed/themes: symptom recognition  · The patient's response to the intervention is accepting. The patient's progress toward treatment goals is excellent.   · Duration of intervention: 15 minutes.    Review of Systems   GENERAL: No weight gain or loss   SKIN: No rashes or lacerations   HEAD: No headaches   EYES: No exophthalmos, jaundice or blindness   EARS: No dizziness, tinnitus or hearing loss   NOSE: No changes in smell   MOUTH & THROAT: No dyskinetic movements or obvious goiter   CHEST: No shortness of breath, hyperventilation or cough   CARDIOVASCULAR: No tachycardia or chest pain   ABDOMEN: No nausea, vomiting, pain, constipation or diarrhea   URINARY: No frequency, dysuria or sexual dysfunction   ENDOCRINE: No polydipsia, polyuria   MUSCULOSKELETAL: No pain or stiffness of the joints   NEUROLOGIC: No  "weakness, sensory changes, seizures, confusion, memory loss, tremor or other abnormal movements      Psychiatric Review Of Systems:  sleep: no  appetite changes: no  weight changes: no  energy/anergy: no  interest/pleasure/anhedonia: no  somatic symptoms: no  libido: not assessed  anxiety/panic: no      Past Medical, Family and Social History: The patient's past medical, family and social history have been reviewed and updated as appropriate within the electronic medical record - see encounter notes.    Compliance: no, Patient has run out of medication and forgetten to make follow-up appointments on a few occasions    Side effects: None    Risk Parameters:  Patient reports no suicidal ideation  Patient reports no homicidal ideation  Patient reports no self-injurious behavior  Patient reports no violent behavior    Exam (detailed: at least 9 elements; comprehensive: all 15 elements)   Constitutional  Vitals:  Most recent vital signs, dated less than 90 days prior to this appointment, were reviewed.   Vitals:    10/19/17 1110   BP: (!) 141/68   Pulse: (!) 48   Weight: 82.7 kg (182 lb 6.4 oz)   Height: 5' 9" (1.753 m)        General:  unremarkable, age appropriate     Musculoskeletal  Muscle Strength/Tone:  not examined   Gait & Station:  non-ataxic     Psychiatric  Speech:  no latency; no press   Mood & Affect:  happy  congruent and appropriate   Thought Process:  normal and logical   Associations:  intact   Thought Content:  normal, no suicidality, no homicidality, delusions, or paranoia   Insight:  intact   Judgement: behavior is adequate to circumstances   Orientation:  grossly intact   Memory: intact for content of interview   Language: grossly intact   Attention Span & Concentration:  able to focus   Fund of Knowledge:  intact and appropriate to age and level of education     Assessment and Diagnosis   Status/Progress: Based on the examination today, the patient's problem(s) is/are well controlled.  New problems " have not been presented today.   Co-morbidities, Diagnostic uncertainty and Lack of compliance are not complicating management of the primary condition.  There are no active rule-out diagnoses for this patient at this time.     General Impression:     Attention Deficit Hyperactivity Disorder, Primarily Inattentive Type    Intervention/Counseling/Treatment Plan   · Medication Management: The risks and benefits of medication were discussed with the patient.   · Resume Adderall 10 mg Bid as patient had run out recently      Return to Clinic: 3 months

## 2017-10-21 ENCOUNTER — OFFICE VISIT (OUTPATIENT)
Dept: URGENT CARE | Facility: CLINIC | Age: 37
End: 2017-10-21
Payer: COMMERCIAL

## 2017-10-21 VITALS
HEART RATE: 71 BPM | SYSTOLIC BLOOD PRESSURE: 127 MMHG | OXYGEN SATURATION: 97 % | TEMPERATURE: 97 F | DIASTOLIC BLOOD PRESSURE: 54 MMHG

## 2017-10-21 DIAGNOSIS — H00.014 HORDEOLUM EXTERNUM OF LEFT UPPER EYELID: ICD-10-CM

## 2017-10-21 DIAGNOSIS — H10.9 CONJUNCTIVITIS, UNSPECIFIED CONJUNCTIVITIS TYPE, UNSPECIFIED LATERALITY: Primary | ICD-10-CM

## 2017-10-21 PROCEDURE — 99213 OFFICE O/P EST LOW 20 MIN: CPT | Mod: S$GLB,,, | Performed by: PHYSICIAN ASSISTANT

## 2017-10-21 RX ORDER — ERYTHROMYCIN 5 MG/G
OINTMENT OPHTHALMIC
Qty: 1 TUBE | Refills: 0 | Status: SHIPPED | OUTPATIENT
Start: 2017-10-21 | End: 2018-05-30

## 2017-10-21 NOTE — PROGRESS NOTES
Subjective:       Patient ID: Juan Manuel Hdz is a 36 y.o. male.    Vitals:  temperature is 97.4 °F (36.3 °C). His blood pressure is 127/54 (abnormal) and his pulse is 71. His oxygen saturation is 97%.     Chief Complaint: Eye Pain (left eye redness, itching, pain)    Pt. Complains of left eye redness, swelling, pain and itching for the past two days.      Eye Pain    The left eye is affected. This is a new problem. The current episode started yesterday. The problem occurs constantly. The problem has been gradually worsening. There was no injury mechanism. The pain is at a severity of 3/10. The pain is mild. There is no known exposure to pink eye. He does not wear contacts. Associated symptoms include eye redness, a foreign body sensation and itching. Pertinent negatives include no blurred vision, fever, nausea or vomiting. He has tried nothing for the symptoms. The treatment provided mild relief.     Review of Systems   Constitution: Negative for chills and fever.   HENT: Negative for congestion and sore throat.    Eyes: Positive for itching, pain and redness. Negative for blurred vision.   Cardiovascular: Negative for chest pain.   Respiratory: Negative for shortness of breath.    Skin: Negative for rash.   Musculoskeletal: Negative for joint pain.   Gastrointestinal: Negative for abdominal pain, diarrhea, nausea and vomiting.   Genitourinary: Negative for dysuria.   Neurological: Negative for dizziness and focal weakness.   Psychiatric/Behavioral: Negative for depression.       Objective:      Physical Exam   Constitutional: He is oriented to person, place, and time. He appears well-developed and well-nourished. No distress.   HENT:   Head: Normocephalic and atraumatic.   Eyes: EOM are normal. Pupils are equal, round, and reactive to light. Left conjunctiva is injected.   Slit lamp exam:       The left eye shows no corneal abrasion, no corneal ulcer and no fluorescein uptake.   Neck: Normal range of motion.  Neck supple.   Cardiovascular: Normal rate and regular rhythm.  Exam reveals no gallop and no friction rub.    No murmur heard.  Pulmonary/Chest: Effort normal and breath sounds normal. He has no wheezes. He has no rales.   Musculoskeletal: Normal range of motion.   Neurological: He is alert and oriented to person, place, and time.   Skin: Skin is warm and dry. No rash noted. No erythema.   Psychiatric: He has a normal mood and affect. His behavior is normal. Judgment and thought content normal.   Nursing note and vitals reviewed.      Assessment:       1. Conjunctivitis, unspecified conjunctivitis type, unspecified laterality    2. Hordeolum externum of left upper eyelid        Plan:         Conjunctivitis, unspecified conjunctivitis type, unspecified laterality  -     erythromycin (ROMYCIN) ophthalmic ointment; Apply 1/2 inch ribbon to the inner lower eyelid and then gently massage onto the eye with the eye closed.  Dispense: 1 Tube; Refill: 0    Hordeolum externum of left upper eyelid  -     erythromycin (ROMYCIN) ophthalmic ointment; Apply 1/2 inch ribbon to the inner lower eyelid and then gently massage onto the eye with the eye closed.  Dispense: 1 Tube; Refill: 0      Juan Manuel was seen today for eye pain.    Diagnoses and all orders for this visit:    Conjunctivitis, unspecified conjunctivitis type, unspecified laterality  -     erythromycin (ROMYCIN) ophthalmic ointment; Apply 1/2 inch ribbon to the inner lower eyelid and then gently massage onto the eye with the eye closed.    Hordeolum externum of left upper eyelid  -     erythromycin (ROMYCIN) ophthalmic ointment; Apply 1/2 inch ribbon to the inner lower eyelid and then gently massage onto the eye with the eye closed.      Patient Instructions   - Rest.    - Drink plenty of fluids.    - Tylenol or Ibuprofen as directed as needed for fever/pain.    - Warm compresses to the affected area for 20 minutes at a time.   - Follow up with your PCP or specialty  clinic as directed in the next 1-2 weeks if not improved or as needed.  You can call [phone] to schedule an appointment with the appropriate provider.    - Go to the ED if your symptoms worsen.    Conjunctivitis, Nonspecific    The membrane that covers the white part of your eye (the conjunctiva) is inflamed. Inflammation happens when your body responds to an injury, allergic reaction, infection, or illness. Symptoms of inflammation in the eye may include redness, irritation, itching, swelling, or burning. These symptoms should go away within the next 24 hours. Conjunctivitis may be related to a particle that was in your eye. If so, it may wash out with your tears or irrigation treatment. Being exposed to liquid chemicals or fumes may also cause this reaction.   Home care  · Apply a cold pack (ice in a plastic bag, wrapped in a towel) over the eye for 20 minutes at a time. This will reduce pain.  · Artificial tears may be prescribed to reduce irritation or redness.  These should be used 3 to 4 times a day.  · You may use acetaminophen or ibuprofen to control pain, unless another medicine was prescribed.(Note: If you have chronic liver or kidney disease, or if you have ever had a stomach ulcer or gastrointestinal bleeding, talk with your healthcare provider before using these medicines.)  Follow-up care  Follow up with your healthcare provider, or as advised.  When to seek medical advice  Call your healthcare provider right away if any of these occur:  · Increased eyelid swelling  · Increased eye pain  · Increased redness or drainage from the eye  · Increased blurry vision or increased sensitivity to light  · Failure of normal vision to return within 24 to 48 hours  Date Last Reviewed: 6/14/2015 © 2000-2017 [employer]. [address] [place] [address], [place] [address]. All rights reserved. This information is not intended as a substitute for professional medical care. Always follow your healthcare  professional's instructions.        Sty (or Stye)  A sty is an infection of the oil gland of the eyelid. It may develop into a small pocket of pus (abscess). This can cause pain, redness, and swelling. In early stages, styes are treated with antibiotic cream, eye drops, or warm packs (small towels soaked in warm water). More severe cases may need to be opened and drained by a health care provider.  Home care  · Eye drops or ointment are usually prescribed to treat the infection. Use these as directed.   ¨ Artificial tears may also be used to lubricate the eye and make it more comfortable. These may be purchased without a prescription.   ¨ Talk to your health care provider before using any over-the-counter treatment for a sty.  · Apply a warm, damp towel to the affected eye for at least 5 minutes, 3 to 4 times a day for a week. Warm compresses open the pores and speed the healing. If the compresses are too hot, they may burn your eyelid.  · Sometimes the sty will drain with this treatment alone. If this happens, continue the antibiotic until all the redness and swelling are gone.  · Wash your hands before and after touching the infected eye to avoid spreading the infection.  · Do not squeeze or try to puncture the sty.  Follow-up care  Follow up with your health care provider, or as advised.   When to seek medical advice  Call your health care provider right away if you have:  · Increase in swelling or redness around the eyelid after 48 to 72 hours  · Increase in eye pain or the eyelid blisters  · Increase in warmth--the eyelid feels hot  · Drainage of blood or thick pus from the sty  · Blister on the eyelid  · Inability to open the eyelid due to swelling  · Fever  ¨ 1 degree above your normal temperature lasting for 24 to 48 hours, or  ¨ Whatever your health care provider told you to report based on your medical condition  · Vision changes  · Headache or stiff neck  · Recurrence of the sty  Date Last Reviewed:  6/14/2015  © 4226-2796 [employer] [address] [place] [address], [place] [address]. All rights reserved. This information is not intended as a substitute for professional medical care. Always follow your healthcare professional's instructions.

## 2017-10-21 NOTE — PATIENT INSTRUCTIONS
- Rest.    - Drink plenty of fluids.    - Tylenol or Ibuprofen as directed as needed for fever/pain.    - Warm compresses to the affected area for 20 minutes at a time.   - Follow up with your PCP or specialty clinic as directed in the next 1-2 weeks if not improved or as needed.  You can call [phone] to schedule an appointment with the appropriate provider.    - Go to the ED if your symptoms worsen.    Conjunctivitis, Nonspecific    The membrane that covers the white part of your eye (the conjunctiva) is inflamed. Inflammation happens when your body responds to an injury, allergic reaction, infection, or illness. Symptoms of inflammation in the eye may include redness, irritation, itching, swelling, or burning. These symptoms should go away within the next 24 hours. Conjunctivitis may be related to a particle that was in your eye. If so, it may wash out with your tears or irrigation treatment. Being exposed to liquid chemicals or fumes may also cause this reaction.   Home care  · Apply a cold pack (ice in a plastic bag, wrapped in a towel) over the eye for 20 minutes at a time. This will reduce pain.  · Artificial tears may be prescribed to reduce irritation or redness.  These should be used 3 to 4 times a day.  · You may use acetaminophen or ibuprofen to control pain, unless another medicine was prescribed.(Note: If you have chronic liver or kidney disease, or if you have ever had a stomach ulcer or gastrointestinal bleeding, talk with your healthcare provider before using these medicines.)  Follow-up care  Follow up with your healthcare provider, or as advised.  When to seek medical advice  Call your healthcare provider right away if any of these occur:  · Increased eyelid swelling  · Increased eye pain  · Increased redness or drainage from the eye  · Increased blurry vision or increased sensitivity to light  · Failure of normal vision to return within 24 to 48 hours  Date Last Reviewed: 6/14/2015  ©  6462-1385 [employer]. [address] [place] [address], [place] [address]. All rights reserved. This information is not intended as a substitute for professional medical care. Always follow your healthcare professional's instructions.        Sty (or Stye)  A sty is an infection of the oil gland of the eyelid. It may develop into a small pocket of pus (abscess). This can cause pain, redness, and swelling. In early stages, styes are treated with antibiotic cream, eye drops, or warm packs (small towels soaked in warm water). More severe cases may need to be opened and drained by a health care provider.  Home care  · Eye drops or ointment are usually prescribed to treat the infection. Use these as directed.   ¨ Artificial tears may also be used to lubricate the eye and make it more comfortable. These may be purchased without a prescription.   ¨ Talk to your health care provider before using any over-the-counter treatment for a sty.  · Apply a warm, damp towel to the affected eye for at least 5 minutes, 3 to 4 times a day for a week. Warm compresses open the pores and speed the healing. If the compresses are too hot, they may burn your eyelid.  · Sometimes the sty will drain with this treatment alone. If this happens, continue the antibiotic until all the redness and swelling are gone.  · Wash your hands before and after touching the infected eye to avoid spreading the infection.  · Do not squeeze or try to puncture the sty.  Follow-up care  Follow up with your health care provider, or as advised.   When to seek medical advice  Call your health care provider right away if you have:  · Increase in swelling or redness around the eyelid after 48 to 72 hours  · Increase in eye pain or the eyelid blisters  · Increase in warmth--the eyelid feels hot  · Drainage of blood or thick pus from the sty  · Blister on the eyelid  · Inability to open the eyelid due to swelling  · Fever  ¨ 1 degree above your normal temperature  lasting for 24 to 48 hours, or  ¨ Whatever your health care provider told you to report based on your medical condition  · Vision changes  · Headache or stiff neck  · Recurrence of the sty  Date Last Reviewed: 6/14/2015  © 5552-2898 [employer]. [address] [place] [address], [place] [address]. All rights reserved. This information is not intended as a substitute for professional medical care. Always follow your healthcare professional's instructions.

## 2017-11-21 ENCOUNTER — ANESTHESIA (OUTPATIENT)
Dept: SURGERY | Facility: HOSPITAL | Age: 37
End: 2017-11-21
Payer: COMMERCIAL

## 2017-11-21 ENCOUNTER — TELEPHONE (OUTPATIENT)
Dept: SURGERY | Facility: CLINIC | Age: 37
End: 2017-11-21

## 2017-11-21 ENCOUNTER — ANESTHESIA EVENT (OUTPATIENT)
Dept: SURGERY | Facility: HOSPITAL | Age: 37
End: 2017-11-21
Payer: COMMERCIAL

## 2017-11-21 ENCOUNTER — HOSPITAL ENCOUNTER (OUTPATIENT)
Facility: HOSPITAL | Age: 37
Discharge: HOME | End: 2017-11-22
Attending: EMERGENCY MEDICINE | Admitting: SURGERY
Payer: COMMERCIAL

## 2017-11-21 DIAGNOSIS — K35.30 ACUTE APPENDICITIS WITH LOCALIZED PERITONITIS: ICD-10-CM

## 2017-11-21 DIAGNOSIS — K35.80 ACUTE APPENDICITIS, UNSPECIFIED ACUTE APPENDICITIS TYPE: Primary | ICD-10-CM

## 2017-11-21 PROBLEM — R17 TOTAL BILIRUBIN, ELEVATED: Status: ACTIVE | Noted: 2017-11-21

## 2017-11-21 LAB
ALBUMIN SERPL BCP-MCNC: 4.2 G/DL
ALP SERPL-CCNC: 46 U/L
ALT SERPL W/O P-5'-P-CCNC: 21 U/L
ANION GAP SERPL CALC-SCNC: 8 MMOL/L
AST SERPL-CCNC: 28 U/L
BASOPHILS # BLD AUTO: 0.03 K/UL
BASOPHILS NFR BLD: 0.3 %
BILIRUB SERPL-MCNC: 2 MG/DL
BILIRUB UR QL STRIP: NEGATIVE
BUN SERPL-MCNC: 13 MG/DL
CALCIUM SERPL-MCNC: 9.5 MG/DL
CHLORIDE SERPL-SCNC: 102 MMOL/L
CLARITY UR REFRACT.AUTO: CLEAR
CO2 SERPL-SCNC: 29 MMOL/L
COLOR UR AUTO: ABNORMAL
CREAT SERPL-MCNC: 1.2 MG/DL
DIFFERENTIAL METHOD: ABNORMAL
EOSINOPHIL # BLD AUTO: 0.3 K/UL
EOSINOPHIL NFR BLD: 3.1 %
ERYTHROCYTE [DISTWIDTH] IN BLOOD BY AUTOMATED COUNT: 11.8 %
EST. GFR  (AFRICAN AMERICAN): >60 ML/MIN/1.73 M^2
EST. GFR  (NON AFRICAN AMERICAN): >60 ML/MIN/1.73 M^2
GLUCOSE SERPL-MCNC: 110 MG/DL
GLUCOSE UR QL STRIP: NEGATIVE
HCT VFR BLD AUTO: 41.9 %
HGB BLD-MCNC: 14.5 G/DL
HGB UR QL STRIP: ABNORMAL
IMM GRANULOCYTES # BLD AUTO: 0.03 K/UL
IMM GRANULOCYTES NFR BLD AUTO: 0.3 %
KETONES UR QL STRIP: NEGATIVE
LEUKOCYTE ESTERASE UR QL STRIP: NEGATIVE
LIPASE SERPL-CCNC: 25 U/L
LYMPHOCYTES # BLD AUTO: 1.1 K/UL
LYMPHOCYTES NFR BLD: 10.1 %
MCH RBC QN AUTO: 30.1 PG
MCHC RBC AUTO-ENTMCNC: 34.6 G/DL
MCV RBC AUTO: 87 FL
MICROSCOPIC COMMENT: NORMAL
MONOCYTES # BLD AUTO: 0.8 K/UL
MONOCYTES NFR BLD: 7.9 %
NEUTROPHILS # BLD AUTO: 8.2 K/UL
NEUTROPHILS NFR BLD: 78.3 %
NITRITE UR QL STRIP: NEGATIVE
NRBC BLD-RTO: 0 /100 WBC
PH UR STRIP: 8 [PH] (ref 5–8)
PLATELET # BLD AUTO: 146 K/UL
PMV BLD AUTO: 10 FL
POTASSIUM SERPL-SCNC: 4.1 MMOL/L
PROT SERPL-MCNC: 7.4 G/DL
PROT UR QL STRIP: NEGATIVE
RBC # BLD AUTO: 4.81 M/UL
RBC #/AREA URNS AUTO: 0 /HPF (ref 0–4)
SODIUM SERPL-SCNC: 139 MMOL/L
SP GR UR STRIP: 1 (ref 1–1.03)
URN SPEC COLLECT METH UR: ABNORMAL
UROBILINOGEN UR STRIP-ACNC: NEGATIVE EU/DL
WBC # BLD AUTO: 10.42 K/UL

## 2017-11-21 PROCEDURE — 27201423 OPTIME MED/SURG SUP & DEVICES STERILE SUPPLY: Performed by: SURGERY

## 2017-11-21 PROCEDURE — 25000003 PHARM REV CODE 250: Performed by: SURGERY

## 2017-11-21 PROCEDURE — G0378 HOSPITAL OBSERVATION PER HR: HCPCS

## 2017-11-21 PROCEDURE — 71000033 HC RECOVERY, INTIAL HOUR: Performed by: SURGERY

## 2017-11-21 PROCEDURE — 96361 HYDRATE IV INFUSION ADD-ON: CPT

## 2017-11-21 PROCEDURE — S0030 INJECTION, METRONIDAZOLE: HCPCS | Performed by: SURGERY

## 2017-11-21 PROCEDURE — 37000009 HC ANESTHESIA EA ADD 15 MINS: Performed by: SURGERY

## 2017-11-21 PROCEDURE — D9220A PRA ANESTHESIA: Mod: ANES,,, | Performed by: ANESTHESIOLOGY

## 2017-11-21 PROCEDURE — 63600175 PHARM REV CODE 636 W HCPCS: Performed by: SURGERY

## 2017-11-21 PROCEDURE — D9220A PRA ANESTHESIA: Mod: CRNA,,, | Performed by: NURSE ANESTHETIST, CERTIFIED REGISTERED

## 2017-11-21 PROCEDURE — 44970 LAPAROSCOPY APPENDECTOMY: CPT | Mod: ,,, | Performed by: SURGERY

## 2017-11-21 PROCEDURE — 71000039 HC RECOVERY, EACH ADD'L HOUR: Performed by: SURGERY

## 2017-11-21 PROCEDURE — 36000708 HC OR TIME LEV III 1ST 15 MIN: Performed by: SURGERY

## 2017-11-21 PROCEDURE — 99285 EMERGENCY DEPT VISIT HI MDM: CPT | Mod: 25

## 2017-11-21 PROCEDURE — 37000008 HC ANESTHESIA 1ST 15 MINUTES: Performed by: SURGERY

## 2017-11-21 PROCEDURE — 96376 TX/PRO/DX INJ SAME DRUG ADON: CPT

## 2017-11-21 PROCEDURE — 94761 N-INVAS EAR/PLS OXIMETRY MLT: CPT

## 2017-11-21 PROCEDURE — 83690 ASSAY OF LIPASE: CPT

## 2017-11-21 PROCEDURE — 81001 URINALYSIS AUTO W/SCOPE: CPT

## 2017-11-21 PROCEDURE — 63600175 PHARM REV CODE 636 W HCPCS: Performed by: NURSE ANESTHETIST, CERTIFIED REGISTERED

## 2017-11-21 PROCEDURE — 36000709 HC OR TIME LEV III EA ADD 15 MIN: Performed by: SURGERY

## 2017-11-21 PROCEDURE — 80053 COMPREHEN METABOLIC PANEL: CPT

## 2017-11-21 PROCEDURE — 88304 TISSUE EXAM BY PATHOLOGIST: CPT

## 2017-11-21 PROCEDURE — 25000003 PHARM REV CODE 250: Performed by: EMERGENCY MEDICINE

## 2017-11-21 PROCEDURE — 96374 THER/PROPH/DIAG INJ IV PUSH: CPT

## 2017-11-21 PROCEDURE — 85025 COMPLETE CBC W/AUTO DIFF WBC: CPT

## 2017-11-21 PROCEDURE — 99218 PR INITIAL OBSERVATION CARE,LEVL I: CPT | Mod: 57,,, | Performed by: SURGERY

## 2017-11-21 PROCEDURE — 99285 EMERGENCY DEPT VISIT HI MDM: CPT | Mod: ,,, | Performed by: EMERGENCY MEDICINE

## 2017-11-21 PROCEDURE — 96375 TX/PRO/DX INJ NEW DRUG ADDON: CPT

## 2017-11-21 PROCEDURE — 25000003 PHARM REV CODE 250: Performed by: STUDENT IN AN ORGANIZED HEALTH CARE EDUCATION/TRAINING PROGRAM

## 2017-11-21 PROCEDURE — 63600175 PHARM REV CODE 636 W HCPCS: Performed by: EMERGENCY MEDICINE

## 2017-11-21 PROCEDURE — 27000221 HC OXYGEN, UP TO 24 HOURS

## 2017-11-21 PROCEDURE — 88304 TISSUE EXAM BY PATHOLOGIST: CPT | Mod: 26,,,

## 2017-11-21 PROCEDURE — 25000003 PHARM REV CODE 250: Performed by: NURSE ANESTHETIST, CERTIFIED REGISTERED

## 2017-11-21 PROCEDURE — 25500020 PHARM REV CODE 255: Performed by: EMERGENCY MEDICINE

## 2017-11-21 PROCEDURE — S0030 INJECTION, METRONIDAZOLE: HCPCS | Performed by: STUDENT IN AN ORGANIZED HEALTH CARE EDUCATION/TRAINING PROGRAM

## 2017-11-21 RX ORDER — MORPHINE SULFATE 2 MG/ML
2 INJECTION, SOLUTION INTRAMUSCULAR; INTRAVENOUS EVERY 4 HOURS PRN
Status: DISCONTINUED | OUTPATIENT
Start: 2017-11-21 | End: 2017-11-21

## 2017-11-21 RX ORDER — FENTANYL CITRATE 50 UG/ML
INJECTION, SOLUTION INTRAMUSCULAR; INTRAVENOUS
Status: DISCONTINUED | OUTPATIENT
Start: 2017-11-21 | End: 2017-11-21

## 2017-11-21 RX ORDER — MORPHINE SULFATE 4 MG/ML
4 INJECTION, SOLUTION INTRAMUSCULAR; INTRAVENOUS
Status: COMPLETED | OUTPATIENT
Start: 2017-11-21 | End: 2017-11-21

## 2017-11-21 RX ORDER — SODIUM CHLORIDE 9 MG/ML
INJECTION, SOLUTION INTRAVENOUS CONTINUOUS
Status: DISCONTINUED | OUTPATIENT
Start: 2017-11-21 | End: 2017-11-21

## 2017-11-21 RX ORDER — LIDOCAINE HYDROCHLORIDE 10 MG/ML
INJECTION, SOLUTION EPIDURAL; INFILTRATION; INTRACAUDAL; PERINEURAL
Status: DISCONTINUED | OUTPATIENT
Start: 2017-11-21 | End: 2017-11-21 | Stop reason: HOSPADM

## 2017-11-21 RX ORDER — OXYCODONE HYDROCHLORIDE 5 MG/1
5 TABLET ORAL EVERY 4 HOURS PRN
Status: DISCONTINUED | OUTPATIENT
Start: 2017-11-21 | End: 2017-11-22 | Stop reason: HOSPADM

## 2017-11-21 RX ORDER — HYDROMORPHONE HYDROCHLORIDE 1 MG/ML
0.2 INJECTION, SOLUTION INTRAMUSCULAR; INTRAVENOUS; SUBCUTANEOUS EVERY 5 MIN PRN
Status: DISCONTINUED | OUTPATIENT
Start: 2017-11-21 | End: 2017-11-21 | Stop reason: HOSPADM

## 2017-11-21 RX ORDER — OXYCODONE AND ACETAMINOPHEN 5; 325 MG/1; MG/1
1 TABLET ORAL EVERY 4 HOURS PRN
Qty: 42 TABLET | Refills: 0 | Status: SHIPPED | OUTPATIENT
Start: 2017-11-21 | End: 2018-05-30

## 2017-11-21 RX ORDER — ONDANSETRON 2 MG/ML
4 INJECTION INTRAMUSCULAR; INTRAVENOUS DAILY PRN
Status: DISCONTINUED | OUTPATIENT
Start: 2017-11-21 | End: 2017-11-21 | Stop reason: HOSPADM

## 2017-11-21 RX ORDER — ACETAMINOPHEN 10 MG/ML
INJECTION, SOLUTION INTRAVENOUS
Status: DISCONTINUED | OUTPATIENT
Start: 2017-11-21 | End: 2017-11-21

## 2017-11-21 RX ORDER — ROCURONIUM BROMIDE 10 MG/ML
INJECTION, SOLUTION INTRAVENOUS
Status: DISCONTINUED | OUTPATIENT
Start: 2017-11-21 | End: 2017-11-21

## 2017-11-21 RX ORDER — MIDAZOLAM HYDROCHLORIDE 5 MG/ML
INJECTION INTRAMUSCULAR; INTRAVENOUS
Status: DISCONTINUED | OUTPATIENT
Start: 2017-11-21 | End: 2017-11-21

## 2017-11-21 RX ORDER — SODIUM CHLORIDE 9 MG/ML
INJECTION, SOLUTION INTRAVENOUS CONTINUOUS
Status: DISCONTINUED | OUTPATIENT
Start: 2017-11-21 | End: 2017-11-22

## 2017-11-21 RX ORDER — NEOSTIGMINE METHYLSULFATE 1 MG/ML
INJECTION, SOLUTION INTRAVENOUS
Status: DISCONTINUED | OUTPATIENT
Start: 2017-11-21 | End: 2017-11-21

## 2017-11-21 RX ORDER — GLYCOPYRROLATE 0.2 MG/ML
INJECTION INTRAMUSCULAR; INTRAVENOUS
Status: DISCONTINUED | OUTPATIENT
Start: 2017-11-21 | End: 2017-11-21

## 2017-11-21 RX ORDER — LIDOCAINE HCL/PF 100 MG/5ML
SYRINGE (ML) INTRAVENOUS
Status: DISCONTINUED | OUTPATIENT
Start: 2017-11-21 | End: 2017-11-21

## 2017-11-21 RX ORDER — BUPIVACAINE HYDROCHLORIDE 2.5 MG/ML
INJECTION, SOLUTION EPIDURAL; INFILTRATION; INTRACAUDAL
Status: DISCONTINUED | OUTPATIENT
Start: 2017-11-21 | End: 2017-11-21 | Stop reason: HOSPADM

## 2017-11-21 RX ORDER — DEXAMETHASONE SODIUM PHOSPHATE 4 MG/ML
INJECTION, SOLUTION INTRA-ARTICULAR; INTRALESIONAL; INTRAMUSCULAR; INTRAVENOUS; SOFT TISSUE
Status: DISCONTINUED | OUTPATIENT
Start: 2017-11-21 | End: 2017-11-21

## 2017-11-21 RX ORDER — PROPOFOL 10 MG/ML
VIAL (ML) INTRAVENOUS
Status: DISCONTINUED | OUTPATIENT
Start: 2017-11-21 | End: 2017-11-21

## 2017-11-21 RX ORDER — SODIUM CHLORIDE 0.9 % (FLUSH) 0.9 %
3 SYRINGE (ML) INJECTION
Status: DISCONTINUED | OUTPATIENT
Start: 2017-11-21 | End: 2017-11-22 | Stop reason: HOSPADM

## 2017-11-21 RX ORDER — CIPROFLOXACIN 2 MG/ML
400 INJECTION, SOLUTION INTRAVENOUS
Status: DISCONTINUED | OUTPATIENT
Start: 2017-11-22 | End: 2017-11-22

## 2017-11-21 RX ORDER — METRONIDAZOLE 500 MG/100ML
500 INJECTION, SOLUTION INTRAVENOUS
Status: DISCONTINUED | OUTPATIENT
Start: 2017-11-21 | End: 2017-11-21

## 2017-11-21 RX ORDER — CIPROFLOXACIN 2 MG/ML
400 INJECTION, SOLUTION INTRAVENOUS
Status: DISCONTINUED | OUTPATIENT
Start: 2017-11-21 | End: 2017-11-21

## 2017-11-21 RX ORDER — ONDANSETRON HYDROCHLORIDE 2 MG/ML
INJECTION, SOLUTION INTRAMUSCULAR; INTRAVENOUS
Status: DISCONTINUED | OUTPATIENT
Start: 2017-11-21 | End: 2017-11-21

## 2017-11-21 RX ORDER — AMOXICILLIN 250 MG
1 CAPSULE ORAL 2 TIMES DAILY
Status: DISCONTINUED | OUTPATIENT
Start: 2017-11-21 | End: 2017-11-22 | Stop reason: HOSPADM

## 2017-11-21 RX ORDER — METRONIDAZOLE 500 MG/100ML
500 INJECTION, SOLUTION INTRAVENOUS
Status: DISCONTINUED | OUTPATIENT
Start: 2017-11-21 | End: 2017-11-22

## 2017-11-21 RX ORDER — ONDANSETRON 2 MG/ML
4 INJECTION INTRAMUSCULAR; INTRAVENOUS EVERY 6 HOURS PRN
Status: DISCONTINUED | OUTPATIENT
Start: 2017-11-21 | End: 2017-11-22 | Stop reason: HOSPADM

## 2017-11-21 RX ORDER — OXYCODONE HYDROCHLORIDE 5 MG/1
10 TABLET ORAL EVERY 4 HOURS PRN
Status: DISCONTINUED | OUTPATIENT
Start: 2017-11-21 | End: 2017-11-22 | Stop reason: HOSPADM

## 2017-11-21 RX ORDER — KETOROLAC TROMETHAMINE 15 MG/ML
15 INJECTION, SOLUTION INTRAMUSCULAR; INTRAVENOUS
Status: COMPLETED | OUTPATIENT
Start: 2017-11-21 | End: 2017-11-21

## 2017-11-21 RX ORDER — ONDANSETRON 8 MG/1
8 TABLET, ORALLY DISINTEGRATING ORAL EVERY 8 HOURS PRN
Status: DISCONTINUED | OUTPATIENT
Start: 2017-11-21 | End: 2017-11-21

## 2017-11-21 RX ORDER — ONDANSETRON 2 MG/ML
4 INJECTION INTRAMUSCULAR; INTRAVENOUS
Status: COMPLETED | OUTPATIENT
Start: 2017-11-21 | End: 2017-11-21

## 2017-11-21 RX ORDER — MORPHINE SULFATE 4 MG/ML
4 INJECTION, SOLUTION INTRAMUSCULAR; INTRAVENOUS EVERY 4 HOURS PRN
Status: DISCONTINUED | OUTPATIENT
Start: 2017-11-21 | End: 2017-11-21

## 2017-11-21 RX ADMIN — LIDOCAINE HYDROCHLORIDE 100 MG: 20 INJECTION, SOLUTION INTRAVENOUS at 02:11

## 2017-11-21 RX ADMIN — METRONIDAZOLE 500 MG: 500 INJECTION, SOLUTION INTRAVENOUS at 02:11

## 2017-11-21 RX ADMIN — FENTANYL CITRATE 100 MCG: 50 INJECTION, SOLUTION INTRAMUSCULAR; INTRAVENOUS at 02:11

## 2017-11-21 RX ADMIN — IOHEXOL 75 ML: 350 INJECTION, SOLUTION INTRAVENOUS at 05:11

## 2017-11-21 RX ADMIN — GLYCOPYRROLATE 0.6 MG: 0.2 INJECTION, SOLUTION INTRAMUSCULAR; INTRAVENOUS at 03:11

## 2017-11-21 RX ADMIN — SODIUM CHLORIDE 1000 ML: 0.9 INJECTION, SOLUTION INTRAVENOUS at 04:11

## 2017-11-21 RX ADMIN — OXYCODONE HYDROCHLORIDE 5 MG: 5 TABLET ORAL at 08:11

## 2017-11-21 RX ADMIN — SODIUM CHLORIDE: 0.9 INJECTION, SOLUTION INTRAVENOUS at 03:11

## 2017-11-21 RX ADMIN — ONDANSETRON 4 MG: 2 INJECTION INTRAMUSCULAR; INTRAVENOUS at 04:11

## 2017-11-21 RX ADMIN — SODIUM CHLORIDE: 0.9 INJECTION, SOLUTION INTRAVENOUS at 07:11

## 2017-11-21 RX ADMIN — CIPROFLOXACIN 400 MG: 2 INJECTION, SOLUTION INTRAVENOUS at 02:11

## 2017-11-21 RX ADMIN — KETOROLAC TROMETHAMINE 15 MG: 15 INJECTION, SOLUTION INTRAMUSCULAR; INTRAVENOUS at 05:11

## 2017-11-21 RX ADMIN — ACETAMINOPHEN 1000 MG: 10 INJECTION, SOLUTION INTRAVENOUS at 02:11

## 2017-11-21 RX ADMIN — MIDAZOLAM 2 MG: 5 INJECTION INTRAMUSCULAR; INTRAVENOUS at 01:11

## 2017-11-21 RX ADMIN — SODIUM CHLORIDE, SODIUM GLUCONATE, SODIUM ACETATE, POTASSIUM CHLORIDE, MAGNESIUM CHLORIDE, SODIUM PHOSPHATE, DIBASIC, AND POTASSIUM PHOSPHATE: .53; .5; .37; .037; .03; .012; .00082 INJECTION, SOLUTION INTRAVENOUS at 03:11

## 2017-11-21 RX ADMIN — DEXAMETHASONE SODIUM PHOSPHATE 4 MG: 4 INJECTION, SOLUTION INTRAMUSCULAR; INTRAVENOUS at 02:11

## 2017-11-21 RX ADMIN — METRONIDAZOLE 500 MG: 500 INJECTION, SOLUTION INTRAVENOUS at 11:11

## 2017-11-21 RX ADMIN — SODIUM CHLORIDE: 0.9 INJECTION, SOLUTION INTRAVENOUS at 11:11

## 2017-11-21 RX ADMIN — OXYCODONE HYDROCHLORIDE 5 MG: 5 TABLET ORAL at 10:11

## 2017-11-21 RX ADMIN — LIDOCAINE HYDROCHLORIDE 50 MG: 20 INJECTION, SOLUTION INTRAVENOUS at 03:11

## 2017-11-21 RX ADMIN — MORPHINE SULFATE 4 MG: 4 INJECTION INTRAVENOUS at 05:11

## 2017-11-21 RX ADMIN — ROCURONIUM BROMIDE 40 MG: 10 INJECTION, SOLUTION INTRAVENOUS at 02:11

## 2017-11-21 RX ADMIN — MORPHINE SULFATE 4 MG: 4 INJECTION, SOLUTION INTRAMUSCULAR; INTRAVENOUS at 04:11

## 2017-11-21 RX ADMIN — STANDARDIZED SENNA CONCENTRATE AND DOCUSATE SODIUM 1 TABLET: 8.6; 5 TABLET, FILM COATED ORAL at 08:11

## 2017-11-21 RX ADMIN — ROCURONIUM BROMIDE 10 MG: 10 INJECTION, SOLUTION INTRAVENOUS at 02:11

## 2017-11-21 RX ADMIN — NEOSTIGMINE METHYLSULFATE 5 MG: 1 INJECTION INTRAVENOUS at 03:11

## 2017-11-21 RX ADMIN — PROPOFOL 200 MG: 10 INJECTION, EMULSION INTRAVENOUS at 02:11

## 2017-11-21 RX ADMIN — MORPHINE SULFATE 2 MG: 2 INJECTION, SOLUTION INTRAMUSCULAR; INTRAVENOUS at 12:11

## 2017-11-21 RX ADMIN — ONDANSETRON 4 MG: 2 INJECTION, SOLUTION INTRAMUSCULAR; INTRAVENOUS at 03:11

## 2017-11-21 NOTE — ASSESSMENT & PLAN NOTE
-Remainder of LFTs are within normal limits  -no abnormalities of the gallbladder on CT  -No physical exam findings to suggest biliary etiology of patient's current presenting symptoms  -He has had elevated total bilirubin (mostly unconjugated) on all of his lab draws (in 2014)  -Possibility of Gilbert's syndrome; can be referred as outpatient to hepatologist

## 2017-11-21 NOTE — TELEPHONE ENCOUNTER
"Spoke with patient's girl friend "Miladis,"  Post op appt scheduled, will reach out to patient tomorrow to confirm post op appt   "

## 2017-11-21 NOTE — HPI
"Juan Manuel Hdz is a 36 y.o. male with history of R inguinal hernia repair (TEP? Technique) who presents with 16 hour history of abdominal pain.  Pain started off generalized then localized in the right lower quadrant.  Worse with coughing and when hitting bumps in the car on the way to the ED. Associated with nausea and vomiting (NBNB).  Appetite low but "could eat". No fever, chills, diarrhea, constipation, hematuria, dysuria.  He reports having episodes of generalized abdominal pain in the past (about 4-5 times), which were all self-limiting and not nearly as severe as this pain he is having now.  "

## 2017-11-21 NOTE — NURSING TRANSFER
Nursing Transfer Note      11/21/2017     Transfer To: observation 4    Transfer via stretcher    Transfer with iv infusion    Transported by pct    Medicines sent: no    Chart send with patient: Yes    Notified: spouse

## 2017-11-21 NOTE — BRIEF OP NOTE
[facility]  Brief Operative Note    SUMMARY     Surgery Date: 11/21/2017     Surgeon(s) and Role:     * Stacy Covington MD - Primary     * Stepan Jordan MD - Resident - Assisting    Pre-op Diagnosis:  Acute appendicitis, unspecified acute appendicitis type [K35.80]    Post-op Diagnosis:  Post-Op Diagnosis Codes:     * Acute appendicitis, unspecified acute appendicitis type [K35.80]    Procedure(s) (LRB):  APPENDECTOMY-LAPAROSCOPIC (N/A)    Anesthesia: General    Description of Procedure:   1. Laparoscopic appendectomy    Description of the findings of the procedure: non-perforated appendix; very minimal purulent fluid noted in the pelvis    Estimated Blood Loss: * No values recorded between 11/21/2017  2:24 PM and 11/21/2017  3:32 PM *         Specimens:   Specimen (12h ago through future)    Start     Ordered    11/21/17 1440  Specimen to Pathology - Surgery  Once     Comments:  1. APPENDIX      11/21/17 1440

## 2017-11-21 NOTE — SUBJECTIVE & OBJECTIVE
No current facility-administered medications on file prior to encounter.      Current Outpatient Prescriptions on File Prior to Encounter   Medication Sig    dextroamphetamine-amphetamine 10 mg Tab Take 10 mg by mouth 2 (two) times daily.    erythromycin (ROMYCIN) ophthalmic ointment Apply 1/2 inch ribbon to the inner lower eyelid and then gently massage onto the eye with the eye closed.    fluconazole (DIFLUCAN) 200 MG Tab 1 po biw    LOCOID 0.1 % Lotn AAA bid       Review of patient's allergies indicates:  No Known Allergies    Past Medical History:   Diagnosis Date    ADHD (attention deficit hyperactivity disorder)     Allergy     seasonal    AR (allergic rhinitis)     History of psychiatric care     Psychiatric problem     Reactive airway disease     Therapy      Past Surgical History:   Procedure Laterality Date    BACK SURGERY  2010    CLOSED REDUCTION ZYGOMATIC ARCH FRACTURE       Family History     Problem Relation (Age of Onset)    Diabetes Maternal Grandmother, Maternal Grandfather    Heart disease Father    Lupus Mother        Social History Main Topics    Smoking status: Never Smoker    Smokeless tobacco: Never Used    Alcohol use 0.5 oz/week     1 Standard drinks or equivalent per week      Comment: occasionally    Drug use: No    Sexual activity: Not on file     Review of Systems   Constitutional: Positive for appetite change. Negative for activity change, chills and fever.   HENT: Negative for congestion and trouble swallowing.    Respiratory: Negative for cough and shortness of breath.    Cardiovascular: Negative for chest pain and palpitations.   Gastrointestinal: Positive for abdominal pain, nausea and vomiting. Negative for abdominal distention, blood in stool, constipation and diarrhea.   Genitourinary: Negative for difficulty urinating, dysuria and hematuria.   Musculoskeletal: Negative.    Hematological: Does not bruise/bleed easily.     Objective:     Vital Signs (Most  Recent):  Temp: 98.7 °F (37.1 °C) (11/21/17 0629)  Pulse: 70 (11/21/17 0629)  Resp: 16 (11/21/17 0629)  BP: 128/78 (11/21/17 0629)  SpO2: 100 % (11/21/17 0629) Vital Signs (24h Range):  Temp:  [98.7 °F (37.1 °C)] 98.7 °F (37.1 °C)  Pulse:  [70-71] 70  Resp:  [16-18] 16  SpO2:  [99 %-100 %] 100 %  BP: (128-130)/(78-82) 128/78     Weight: 79.4 kg (175 lb)  Body mass index is 25.84 kg/m².    Physical Exam   Constitutional: He is oriented to person, place, and time. He appears well-developed and well-nourished. No distress.   HENT:   Head: Normocephalic and atraumatic.   Eyes: EOM are normal. No scleral icterus.   Neck: Normal range of motion.   Cardiovascular: Normal rate and regular rhythm.    Pulmonary/Chest: Effort normal. No respiratory distress.   Abdominal: Soft. He exhibits no distension. There is tenderness (focally tender at McBurney's point; +Rovsing sign; +psoas sign; -Obturator sign). There is guarding (voluntary). No hernia.   Musculoskeletal: Normal range of motion. He exhibits no edema.   Neurological: He is alert and oriented to person, place, and time.   Skin: He is not diaphoretic.   Nursing note and vitals reviewed.      Significant Labs:  CBC:   Recent Labs  Lab 11/21/17  0405   WBC 10.42   RBC 4.81   HGB 14.5   HCT 41.9   *   MCV 87   MCH 30.1   MCHC 34.6     CMP:   Recent Labs  Lab 11/21/17  0405      CALCIUM 9.5   ALBUMIN 4.2   PROT 7.4      K 4.1   CO2 29      BUN 13   CREATININE 1.2   ALKPHOS 46*   ALT 21   AST 28   BILITOT 2.0*       Significant Diagnostics:  CT: acute appendicitis with fecalith

## 2017-11-21 NOTE — ED TRIAGE NOTES
"Pt presents to the ER c/o generalized "stabbing/cramping" abdominal pain, n/v. Pt reports that this started at 4pm today but that this has happen several time over the last 5 months.   "

## 2017-11-21 NOTE — ANESTHESIA POSTPROCEDURE EVALUATION
"Anesthesia Post Evaluation    Patient: Juan Manuel Hdz    Procedure(s) Performed: Procedure(s) (LRB):  APPENDECTOMY-LAPAROSCOPIC (N/A)    Final Anesthesia Type: general  Patient location during evaluation: PACU  Patient participation: Yes- Able to Participate  Level of consciousness: awake and alert  Post-procedure vital signs: reviewed and stable  Pain management: adequate  Airway patency: patent  PONV status at discharge: No PONV  Anesthetic complications: no      Cardiovascular status: blood pressure returned to baseline  Respiratory status: unassisted, spontaneous ventilation and room air  Hydration status: euvolemic  Follow-up not needed.        Visit Vitals  BP (!) 112/57 (BP Location: Left arm, Patient Position: Lying)   Pulse (!) 59   Temp 36.7 °C (98.1 °F) (Axillary)   Resp 12   Ht 5' 9" (1.753 m)   Wt 79.4 kg (175 lb)   SpO2 100%   BMI 25.84 kg/m²       Pain/La Score: Pain Assessment Performed: Yes (11/21/2017  3:38 PM)  Presence of Pain: denies (11/21/2017  3:38 PM)  Pain Rating Prior to Med Admin: 6 (11/21/2017 12:27 PM)  Pain Rating Post Med Admin: 0 (11/21/2017  3:38 PM)  La Score: 8 (11/21/2017  3:38 PM)      "

## 2017-11-21 NOTE — PLAN OF CARE
Problem: Patient Care Overview  Goal: Plan of Care Review  Outcome: Ongoing (interventions implemented as appropriate)  Pt arrived to unit via WC. AAOx4. VSS on RA.  NS initiated. Infusing to IV in RAC. Denies any pain or discomfort at the present time. Received Morphine IVP in ED. Oriented pt to room and call bell. Verbalized understanding. Bed in lowest locked position and call light within reach. Significant other at bedside. Plan is for lap appendectomy early afternoon. NAD noted. Will continue to monitor.
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
## 2017-11-21 NOTE — TRANSFER OF CARE
"Anesthesia Transfer of Care Note    Patient: Juan Manuel Hdz    Procedure(s) Performed: Procedure(s) (LRB):  APPENDECTOMY-LAPAROSCOPIC (N/A)    Patient location: PACU    Anesthesia Type: general    Transport from OR: Transported from OR on 6-10 L/min O2 by face mask with adequate spontaneous ventilation    Post pain: adequate analgesia    Post assessment: tolerated procedure well and no apparent anesthetic complications    Post vital signs: stable    Level of consciousness: responds to stimulation and sedated    Nausea/Vomiting: no nausea/vomiting    Complications: none    Transfer of care protocol was followed      Last vitals:   Visit Vitals  /68 (BP Location: Left arm, Patient Position: Lying)   Pulse 70   Temp 36.9 °C (98.4 °F) (Oral)   Resp 16   Ht 5' 9" (1.753 m)   Wt 79.4 kg (175 lb)   SpO2 100%   BMI 25.84 kg/m²     "

## 2017-11-21 NOTE — H&P
"[facility]  General Surgery  History & Physical    Patient Name: Juan Manuel Hdz  MRN: [id number]  Admission Date: 11/21/2017  Attending Physician: Phuong Branch MD   Primary Care Provider: Megha Craig MD (Inactive)    Patient information was obtained from patient, past medical records and ER records.     Subjective:     Chief Complaint/Reason for Admission: Abdominal pain    History of Present Illness: Juan Manuel Hdz is a 36 y.o. male with history of R inguinal hernia repair (TEP? Technique) who presents with 16 hour history of abdominal pain.  Pain started off generalized then localized in the right lower quadrant.  Worse with coughing and when hitting bumps in the car on the way to the ED. Associated with nausea and vomiting (NBNB).  Appetite low but "could eat". No fever, chills, diarrhea, constipation, hematuria, dysuria.  He reports having episodes of generalized abdominal pain in the past (about 4-5 times), which were all self-limiting and not nearly as severe as this pain he is having now.    No current facility-administered medications on file prior to encounter.      Current Outpatient Prescriptions on File Prior to Encounter   Medication Sig    dextroamphetamine-amphetamine 10 mg Tab Take 10 mg by mouth 2 (two) times daily.    erythromycin (ROMYCIN) ophthalmic ointment Apply 1/2 inch ribbon to the inner lower eyelid and then gently massage onto the eye with the eye closed.    fluconazole (DIFLUCAN) 200 MG Tab 1 po biw    LOCOID 0.1 % Lotn AAA bid       Review of patient's allergies indicates:  No Known Allergies    Past Medical History:   Diagnosis Date    ADHD (attention deficit hyperactivity disorder)     Allergy     seasonal    AR (allergic rhinitis)     History of psychiatric care     Psychiatric problem     Reactive airway disease     Therapy      Past Surgical History:   Procedure Laterality Date    BACK SURGERY  2010    CLOSED REDUCTION ZYGOMATIC " ARCH FRACTURE       Family History     Problem Relation (Age of Onset)    Diabetes Maternal Grandmother, Maternal Grandfather    Heart disease Father    Lupus Mother        Social History Main Topics    Smoking status: Never Smoker    Smokeless tobacco: Never Used    Alcohol use 0.5 oz/week     1 Standard drinks or equivalent per week      Comment: occasionally    Drug use: No    Sexual activity: Not on file     Review of Systems   Constitutional: Positive for appetite change. Negative for activity change, chills and fever.   HENT: Negative for congestion and trouble swallowing.    Respiratory: Negative for cough and shortness of breath.    Cardiovascular: Negative for chest pain and palpitations.   Gastrointestinal: Positive for abdominal pain, nausea and vomiting. Negative for abdominal distention, blood in stool, constipation and diarrhea.   Genitourinary: Negative for difficulty urinating, dysuria and hematuria.   Musculoskeletal: Negative.    Hematological: Does not bruise/bleed easily.     Objective:     Vital Signs (Most Recent):  Temp: 98.7 °F (37.1 °C) (11/21/17 0629)  Pulse: 70 (11/21/17 0629)  Resp: 16 (11/21/17 0629)  BP: 128/78 (11/21/17 0629)  SpO2: 100 % (11/21/17 0629) Vital Signs (24h Range):  Temp:  [98.7 °F (37.1 °C)] 98.7 °F (37.1 °C)  Pulse:  [70-71] 70  Resp:  [16-18] 16  SpO2:  [99 %-100 %] 100 %  BP: (128-130)/(78-82) 128/78     Weight: 79.4 kg (175 lb)  Body mass index is 25.84 kg/m².    Physical Exam   Constitutional: He is oriented to person, place, and time. He appears well-developed and well-nourished. No distress.   HENT:   Head: Normocephalic and atraumatic.   Eyes: EOM are normal. No scleral icterus.   Neck: Normal range of motion.   Cardiovascular: Normal rate and regular rhythm.    Pulmonary/Chest: Effort normal. No respiratory distress.   Abdominal: Soft. He exhibits no distension. There is tenderness (focally tender at McBurney's point; +Rovsing sign; +psoas sign; -Obturator  sign). There is guarding (voluntary). No hernia.   Musculoskeletal: Normal range of motion. He exhibits no edema.   Neurological: He is alert and oriented to person, place, and time.   Skin: He is not diaphoretic.   Nursing note and vitals reviewed.      Significant Labs:  CBC:   Recent Labs  Lab 11/21/17  0405   WBC 10.42   RBC 4.81   HGB 14.5   HCT 41.9   *   MCV 87   MCH 30.1   MCHC 34.6     CMP:   Recent Labs  Lab 11/21/17  0405      CALCIUM 9.5   ALBUMIN 4.2   PROT 7.4      K 4.1   CO2 29      BUN 13   CREATININE 1.2   ALKPHOS 46*   ALT 21   AST 28   BILITOT 2.0*       Significant Diagnostics:  CT: acute appendicitis with fecalith    Assessment/Plan:     * Total bilirubin, elevated    -Remainder of LFTs are within normal limits  -no abnormalities of the gallbladder on CT  -No physical exam findings to suggest biliary etiology of patient's current presenting symptoms  -He has had elevated total bilirubin (mostly unconjugated) on all of his lab draws (in 2014)  -Possibility of Gilbert's syndrome; can be referred as outpatient to hepatologist        Acute appendicitis    -Admit to general surgery  -To OR for lap appy; consents signed  -IV ABx  -NPO, IVFs  -Ambulate          VTE Risk Mitigation         Ordered     Low Risk of VTE  Once      11/21/17 0615          Phill Olson Jr., MD  General Surgery  [facility]

## 2017-11-21 NOTE — ED PROVIDER NOTES
Encounter Date: 11/21/2017       History     Chief Complaint   Patient presents with    Abdominal Pain     abdominal since noon yesterday. emesis x 3     36 year old with no sig pmhx presents with abdominal pain that was initially periumbilical, described as both sharp and crampy that has been more diffuse with suprapubic focality. He reports sudden onset abdominal pain last afternoon with associated N/V. He has had no trauma, no fever, no diarrhea during this time. Two epidsodes of NBNB emesis. Patient has been able to tolerate food since that time. He reports he has had similar types of abdominal pain but not to this extent. He denies any dysuria or hematuria, no drug or alcohol use.           Review of patient's allergies indicates:  No Known Allergies  Past Medical History:   Diagnosis Date    ADHD (attention deficit hyperactivity disorder)     Allergy     seasonal    AR (allergic rhinitis)     History of psychiatric care     Psychiatric problem     Reactive airway disease     Therapy      Past Surgical History:   Procedure Laterality Date    BACK SURGERY  2010    CLOSED REDUCTION ZYGOMATIC ARCH FRACTURE       Family History   Problem Relation Age of Onset    Lupus Mother     Heart disease Father     Diabetes Maternal Grandmother     Diabetes Maternal Grandfather     ADD / ADHD Neg Hx     Alcohol abuse Neg Hx     Anxiety disorder Neg Hx     Bipolar disorder Neg Hx     Dementia Neg Hx     Depression Neg Hx     Drug abuse Neg Hx     OCD Neg Hx     Paranoid behavior Neg Hx     Physical abuse Neg Hx     Schizophrenia Neg Hx     Seizures Neg Hx     Self injury Neg Hx     Sexual abuse Neg Hx     Suicide Neg Hx     Melanoma Neg Hx      Social History   Substance Use Topics    Smoking status: Never Smoker    Smokeless tobacco: Never Used    Alcohol use 0.5 oz/week     1 Standard drinks or equivalent per week      Comment: occasionally     Review of Systems   Constitutional: Positive for  appetite change. Negative for activity change, chills, diaphoresis, fatigue and fever.   HENT: Negative for congestion, dental problem, rhinorrhea and sinus pressure.    Eyes: Negative for discharge and itching.   Respiratory: Negative for apnea, chest tightness and shortness of breath.    Cardiovascular: Negative for chest pain, palpitations and leg swelling.   Gastrointestinal: Positive for abdominal pain, nausea and vomiting. Negative for abdominal distention, anal bleeding, blood in stool, constipation, diarrhea and rectal pain.   Endocrine: Negative.    Genitourinary: Positive for difficulty urinating. Negative for dysuria, enuresis, frequency, penile swelling, scrotal swelling, testicular pain and urgency.   Musculoskeletal: Negative for arthralgias, back pain and myalgias.   Neurological: Negative for dizziness, facial asymmetry, light-headedness, numbness and headaches.   Hematological: Negative for adenopathy. Does not bruise/bleed easily.   Psychiatric/Behavioral: Negative for agitation and behavioral problems.       Physical Exam     Initial Vitals [11/21/17 0341]   BP Pulse Resp Temp SpO2   130/82 71 18 98.7 °F (37.1 °C) 99 %      MAP       98         Physical Exam    Nursing note and vitals reviewed.  Constitutional: He appears well-developed and well-nourished. He is not diaphoretic. No distress.   Comfortable appearing   HENT:   Head: Normocephalic and atraumatic.   Mouth/Throat: No oropharyngeal exudate.   Eyes: EOM are normal. Pupils are equal, round, and reactive to light.   Neck: Normal range of motion. Neck supple. No thyromegaly present. No tracheal deviation present. No JVD present.   Cardiovascular: Normal rate, regular rhythm and normal heart sounds. Exam reveals no gallop and no friction rub.    No murmur heard.  Pulmonary/Chest: Breath sounds normal. No respiratory distress. He has no wheezes. He has no rales.   Abdominal: Soft. Bowel sounds are normal. He exhibits no distension. There is  tenderness. There is no rebound (RLQ TTP).   Genitourinary: Penis normal. No discharge found.   Musculoskeletal: Normal range of motion. He exhibits no edema or tenderness.   Neurological: He is alert and oriented to person, place, and time. He has normal strength.   Skin: Skin is warm and dry. Capillary refill takes less than 2 seconds. No rash and no abscess noted. No erythema.   Psychiatric: He has a normal mood and affect. Thought content normal.         ED Course   Procedures  Labs Reviewed   CBC W/ AUTO DIFFERENTIAL - Abnormal; Notable for the following:        Result Value    Platelets 146 (*)     Gran # 8.2 (*)     Gran% 78.3 (*)     Lymph% 10.1 (*)     All other components within normal limits   COMPREHENSIVE METABOLIC PANEL   LIPASE   URINALYSIS                   APC / Resident Notes:   HOIV MDM:  36 year old M who presents with abdominal pain x1 day with associated brief N/V. Ab pain diffuse with point of maximal tenderness at RLQ. Afebrile. VSS    Concern for: Pancreatitis, UTI, STI, Appendicitis, GERD, gastroenteritis, dehydration    Plan: CBC, CMP, UA, Lipase will be obtained. Will provide IV fluids, nausea control and pain control. Due to concern for appendicitis. We will obtain CT of the abdomen.  Pily Heck MD  [employer] EM PGY-IV  4:48 AM                ED Course      Clinical Impression:   The primary encounter diagnosis was Acute appendicitis, unspecified acute appendicitis type. A diagnosis of Acute appendicitis with localized peritonitis was also pertinent to this visit.                           Pily Heck MD  Resident  11/22/17 2407

## 2017-11-21 NOTE — PLAN OF CARE
Patient lives alone in a 1 story house w/4 LIEN w/railings. Girlfriend at . Having surgery today. No needs expected.     [facility] given to patient after informed about it;patient verbalized their understanding.        11/21/17 1157   Discharge Assessment   Assessment Type Discharge Planning Assessment   Confirmed/corrected address and phone number on facesheet? Yes   Assessment information obtained from? Patient;Medical Record   Expected Length of Stay (days) (1)   Communicated expected length of stay with patient/caregiver no  (Per MD)   Prior to hospitilization cognitive status: Alert/Oriented;No Deficits   Prior to hospitalization functional status: Independent   Current cognitive status: Alert/Oriented;No Deficits   Current Functional Status: Independent   Facility Arrived From: (N/A)   Lives With alone   Able to Return to Prior Arrangements yes   Is patient able to care for self after discharge? Yes   Who are your caregiver(s) and their phone number(s)? (Emergency contacts: 1. Miladis Hdz Mother [phone] , 2. ManuelLuciano Other     [phone] Uncle  )   Patient's perception of discharge disposition home or selfcare   Readmission Within The Last 30 Days no previous admission in last 30 days   Patient currently being followed by outpatient case management? No   Patient currently receives any other outside agency services? No   Equipment Currently Used at Home none   Do you have any problems affording any of your prescribed medications? No   Is the patient taking medications as prescribed? yes   Does the patient have transportation home? Yes   Transportation Available car;family or friend will provide   Dialysis Name and Scheduled days (N/A)   Does the patient receive services at the [facility]? No   Discharge Plan A Home with family   Discharge Plan B Home with family   Patient/Family In Agreement With Plan yes

## 2017-11-22 VITALS
SYSTOLIC BLOOD PRESSURE: 109 MMHG | OXYGEN SATURATION: 96 % | RESPIRATION RATE: 18 BRPM | DIASTOLIC BLOOD PRESSURE: 56 MMHG | WEIGHT: 175.06 LBS | HEART RATE: 58 BPM | HEIGHT: 69 IN | TEMPERATURE: 97 F | BODY MASS INDEX: 25.93 KG/M2

## 2017-11-22 PROBLEM — R17 TOTAL BILIRUBIN, ELEVATED: Status: ACTIVE | Noted: 2017-11-22

## 2017-11-22 LAB
ANION GAP SERPL CALC-SCNC: 8 MMOL/L
BASOPHILS # BLD AUTO: 0.01 K/UL
BASOPHILS NFR BLD: 0.1 %
BUN SERPL-MCNC: 10 MG/DL
CALCIUM SERPL-MCNC: 8.3 MG/DL
CHLORIDE SERPL-SCNC: 106 MMOL/L
CO2 SERPL-SCNC: 25 MMOL/L
CREAT SERPL-MCNC: 0.9 MG/DL
DIFFERENTIAL METHOD: ABNORMAL
EOSINOPHIL # BLD AUTO: 0 K/UL
EOSINOPHIL NFR BLD: 0.1 %
ERYTHROCYTE [DISTWIDTH] IN BLOOD BY AUTOMATED COUNT: 11.8 %
EST. GFR  (AFRICAN AMERICAN): >60 ML/MIN/1.73 M^2
EST. GFR  (NON AFRICAN AMERICAN): >60 ML/MIN/1.73 M^2
GLUCOSE SERPL-MCNC: 132 MG/DL
HCT VFR BLD AUTO: 33.5 %
HGB BLD-MCNC: 12 G/DL
IMM GRANULOCYTES # BLD AUTO: 0.03 K/UL
IMM GRANULOCYTES NFR BLD AUTO: 0.3 %
LYMPHOCYTES # BLD AUTO: 0.6 K/UL
LYMPHOCYTES NFR BLD: 5.9 %
MAGNESIUM SERPL-MCNC: 1.7 MG/DL
MCH RBC QN AUTO: 31.3 PG
MCHC RBC AUTO-ENTMCNC: 35.8 G/DL
MCV RBC AUTO: 88 FL
MONOCYTES # BLD AUTO: 0.7 K/UL
MONOCYTES NFR BLD: 6.9 %
NEUTROPHILS # BLD AUTO: 9 K/UL
NEUTROPHILS NFR BLD: 86.7 %
NRBC BLD-RTO: 0 /100 WBC
PHOSPHATE SERPL-MCNC: 3.6 MG/DL
PLATELET # BLD AUTO: 114 K/UL
PMV BLD AUTO: 10.4 FL
POTASSIUM SERPL-SCNC: 3.7 MMOL/L
RBC # BLD AUTO: 3.83 M/UL
SODIUM SERPL-SCNC: 139 MMOL/L
WBC # BLD AUTO: 10.42 K/UL

## 2017-11-22 PROCEDURE — 36415 COLL VENOUS BLD VENIPUNCTURE: CPT

## 2017-11-22 PROCEDURE — 80048 BASIC METABOLIC PNL TOTAL CA: CPT

## 2017-11-22 PROCEDURE — G0378 HOSPITAL OBSERVATION PER HR: HCPCS

## 2017-11-22 PROCEDURE — 25000003 PHARM REV CODE 250: Performed by: STUDENT IN AN ORGANIZED HEALTH CARE EDUCATION/TRAINING PROGRAM

## 2017-11-22 PROCEDURE — 63600175 PHARM REV CODE 636 W HCPCS: Performed by: STUDENT IN AN ORGANIZED HEALTH CARE EDUCATION/TRAINING PROGRAM

## 2017-11-22 PROCEDURE — S0030 INJECTION, METRONIDAZOLE: HCPCS | Performed by: STUDENT IN AN ORGANIZED HEALTH CARE EDUCATION/TRAINING PROGRAM

## 2017-11-22 PROCEDURE — 85025 COMPLETE CBC W/AUTO DIFF WBC: CPT

## 2017-11-22 PROCEDURE — 83735 ASSAY OF MAGNESIUM: CPT

## 2017-11-22 PROCEDURE — 84100 ASSAY OF PHOSPHORUS: CPT

## 2017-11-22 RX ORDER — DOCUSATE SODIUM 100 MG/1
100 CAPSULE, LIQUID FILLED ORAL 2 TIMES DAILY
Refills: 0 | COMMUNITY
Start: 2017-11-22 | End: 2018-05-30

## 2017-11-22 RX ADMIN — OXYCODONE HYDROCHLORIDE 5 MG: 5 TABLET ORAL at 02:11

## 2017-11-22 RX ADMIN — CIPROFLOXACIN 400 MG: 2 INJECTION, SOLUTION INTRAVENOUS at 03:11

## 2017-11-22 RX ADMIN — STANDARDIZED SENNA CONCENTRATE AND DOCUSATE SODIUM 1 TABLET: 8.6; 5 TABLET, FILM COATED ORAL at 08:11

## 2017-11-22 RX ADMIN — OXYCODONE HYDROCHLORIDE 5 MG: 5 TABLET ORAL at 07:11

## 2017-11-22 RX ADMIN — METRONIDAZOLE 500 MG: 500 INJECTION, SOLUTION INTRAVENOUS at 07:11

## 2017-11-22 NOTE — PROGRESS NOTES
"[facility]  General Surgery  Progress Note    Subjective:     History of Present Illness:  Juan Manuel Hdz is a 36 y.o. male with history of R inguinal hernia repair (TEP? Technique) who presents with 16 hour history of abdominal pain.  Pain started off generalized then localized in the right lower quadrant.  Worse with coughing and when hitting bumps in the car on the way to the ED. Associated with nausea and vomiting (NBNB).  Appetite low but "could eat". No fever, chills, diarrhea, constipation, hematuria, dysuria.  He reports having episodes of generalized abdominal pain in the past (about 4-5 times), which were all self-limiting and not nearly as severe as this pain he is having now.    Post-Op Info:  Procedure(s) (LRB):  APPENDECTOMY-LAPAROSCOPIC (N/A)   1 Day Post-Op     Interval History: No acute events overnight. Pain is well controlled. Tolerating diet. Voiding without issues. No nausea. Ready to go home this morning. Afebrile and VSS.    Medications:  Continuous Infusions:   Scheduled Meds:   senna-docusate 8.6-50 mg  1 tablet Oral BID     PRN Meds:ondansetron, oxyCODONE, oxyCODONE, sodium chloride 0.9%     Review of patient's allergies indicates:  No Known Allergies  Objective:     Vital Signs (Most Recent):  Temp: 97.4 °F (36.3 °C) (11/22/17 0735)  Pulse: (!) 58 (11/22/17 0735)  Resp: 18 (11/22/17 0421)  BP: (!) 109/56 (11/22/17 0735)  SpO2: 96 % (11/22/17 0735) Vital Signs (24h Range):  Temp:  [96.2 °F (35.7 °C)-98.4 °F (36.9 °C)] 97.4 °F (36.3 °C)  Pulse:  [46-87] 58  Resp:  [11-18] 18  SpO2:  [95 %-100 %] 96 %  BP: ()/(51-76) 109/56     Weight: 79.4 kg (175 lb 0.7 oz)  Body mass index is 25.85 kg/m².    Intake/Output - Last 3 Shifts       11/20 0700 - 11/21 0659 11/21 0700 - 11/22 0659 11/22 0700 - 11/23 0659    P.O.  30     I.V. (mL/kg)  1500 (18.9)     IV Piggyback 1000      Total Intake(mL/kg) 1000 (12.6) 1530 (19.3)     Urine (mL/kg/hr)  550 (0.3)     Stool  0 (0) "     Total Output   550      Net +1000 +980             Stool Occurrence  0 x           Physical Exam   Constitutional: He is oriented to person, place, and time. He appears well-developed and well-nourished.   HENT:   Head: Normocephalic and atraumatic.   Eyes: No scleral icterus.   Neck: Normal range of motion. Neck supple.   Cardiovascular: Normal rate and regular rhythm.    Pulmonary/Chest: Effort normal and breath sounds normal.   Abdominal: Soft. Bowel sounds are normal. There is tenderness.   Appropriate incisional tenderness   Musculoskeletal: Normal range of motion.   Neurological: He is alert and oriented to person, place, and time.   Skin: Skin is warm and dry.   Psychiatric: He has a normal mood and affect.     Significant Labs:  CBC:   Recent Labs  Lab 11/22/17 0414   WBC 10.42   RBC 3.83*   HGB 12.0*   HCT 33.5*   *   MCV 88   MCH 31.3*   MCHC 35.8     BMP:   Recent Labs  Lab 11/22/17 0414   *      K 3.7      CO2 25   BUN 10   CREATININE 0.9   CALCIUM 8.3*   MG 1.7       Significant Diagnostics:  None    Assessment/Plan:     * Total bilirubin, elevated    -Remainder of LFTs are within normal limits  -no abnormalities of the gallbladder on CT  -No physical exam findings to suggest biliary etiology of patient's current presenting symptoms  -He has had elevated total bilirubin (mostly unconjugated) on all of his lab draws (in 2014)  -Possibility of Gilbert's syndrome; can be referred as outpatient to hepatologist        Acute appendicitis    - Advance to regular diet  - Continue oral pain meds  - DC IVF; saline lock IV  - Discharge home today with 2 week follow up in clinic with Dr. Covington; work excuse will be sent to patient            Stepan Jordan MD  General Surgery  [facility]

## 2017-11-22 NOTE — ASSESSMENT & PLAN NOTE
- Advance to regular diet  - Continue oral pain meds  - DC IVF; saline lock IV  - Discharge home today with 2 week follow up in clinic with Dr. Covington; work excuse will be sent to patient

## 2017-11-22 NOTE — PLAN OF CARE
Patient discharged home today with no needs.    Future Appointments  Date Time Provider Department Center   11/30/2017 2:15 PM Stacy Covington MD [facility] BRSTS[facility]   2/8/2018 5:30 PM David Kelly MD [facility] PSYCH [facility]          11/22/17 1318   Final Note   Assessment Type Final Discharge Note   Discharge Disposition Home   Hospital Follow Up  Appt(s) scheduled? Yes   Right Care Referral Info   Post Acute Recommendation No Care

## 2017-11-22 NOTE — SUBJECTIVE & OBJECTIVE
Interval History: No acute events overnight. Pain is well controlled. Tolerating diet. Voiding without issues. No nausea. Ready to go home this morning. Afebrile and VSS.    Medications:  Continuous Infusions:   Scheduled Meds:   senna-docusate 8.6-50 mg  1 tablet Oral BID     PRN Meds:ondansetron, oxyCODONE, oxyCODONE, sodium chloride 0.9%     Review of patient's allergies indicates:  No Known Allergies  Objective:     Vital Signs (Most Recent):  Temp: 97.4 °F (36.3 °C) (11/22/17 0735)  Pulse: (!) 58 (11/22/17 0735)  Resp: 18 (11/22/17 0421)  BP: (!) 109/56 (11/22/17 0735)  SpO2: 96 % (11/22/17 0735) Vital Signs (24h Range):  Temp:  [96.2 °F (35.7 °C)-98.4 °F (36.9 °C)] 97.4 °F (36.3 °C)  Pulse:  [46-87] 58  Resp:  [11-18] 18  SpO2:  [95 %-100 %] 96 %  BP: ()/(51-76) 109/56     Weight: 79.4 kg (175 lb 0.7 oz)  Body mass index is 25.85 kg/m².    Intake/Output - Last 3 Shifts       11/20 0700 - 11/21 0659 11/21 0700 - 11/22 0659 11/22 0700 - 11/23 0659    P.O.  30     I.V. (mL/kg)  1500 (18.9)     IV Piggyback 1000      Total Intake(mL/kg) 1000 (12.6) 1530 (19.3)     Urine (mL/kg/hr)  550 (0.3)     Stool  0 (0)     Total Output   550      Net +1000 +980             Stool Occurrence  0 x           Physical Exam   Constitutional: He is oriented to person, place, and time. He appears well-developed and well-nourished.   HENT:   Head: Normocephalic and atraumatic.   Eyes: No scleral icterus.   Neck: Normal range of motion. Neck supple.   Cardiovascular: Normal rate and regular rhythm.    Pulmonary/Chest: Effort normal and breath sounds normal.   Abdominal: Soft. Bowel sounds are normal. There is tenderness.   Appropriate incisional tenderness   Musculoskeletal: Normal range of motion.   Neurological: He is alert and oriented to person, place, and time.   Skin: Skin is warm and dry.   Psychiatric: He has a normal mood and affect.     Significant Labs:  CBC:   Recent Labs  Lab 11/22/17  0414   WBC 10.42   RBC 3.83*    HGB 12.0*   HCT 33.5*   *   MCV 88   MCH 31.3*   MCHC 35.8     BMP:   Recent Labs  Lab 11/22/17  0414   *      K 3.7      CO2 25   BUN 10   CREATININE 0.9   CALCIUM 8.3*   MG 1.7       Significant Diagnostics:  None

## 2017-11-22 NOTE — NURSING
Pt received to floor from observation. No acute distress noted. Pt orientated to floor. VSS and Aox4. PIV #20 to Right AC infusion Ns@125 ml/hr. 3 noted lap sites to abdomen with bandaid covering site. No drainage noted. Wife at bedside. Bed in lowest position and locked. Call light within reach. Will continue to monitor patient.

## 2017-11-22 NOTE — PROGRESS NOTES
Pt arrived back to unit via stretcher. VSS on RA. NS infusing @125 cc/hr to IV in RAC. Lap sites x 3. Bandaids dry and intact. C/o some discomfort to incison sites. On clear liquid diet. Reviewed how to place meal orders. Pt verbalized understanding. Placing dinner order now. Bed in lowest locked position and call light within reach. NAD noted.

## 2017-11-23 NOTE — DISCHARGE SUMMARY
"[facility]  General Surgery  Discharge Summary      Patient Name: Juan Manuel Hdz  MRN: [id number]  Admission Date: 11/21/2017  Hospital Length of Stay: 0 days  Discharge Date and Time: 11/22/2017 12:10 PM  Attending Physician: Dr. Covington  Discharging Provider: Stepan Jordan MD  Primary Care Provider: Megha Craig MD (Inactive)     HPI: Juan Manuel Hdz is a 36 y.o. male with history of R inguinal hernia repair (TEP? Technique) who presents with 16 hour history of abdominal pain.  Pain started off generalized then localized in the right lower quadrant.  Worse with coughing and when hitting bumps in the car on the way to the ED. Associated with nausea and vomiting (NBNB).  Appetite low but "could eat". No fever, chills, diarrhea, constipation, hematuria, dysuria.  He reports having episodes of generalized abdominal pain in the past (about 4-5 times), which were all self-limiting and not nearly as severe as this pain he is having now.    Procedure(s) (LRB):  APPENDECTOMY-LAPAROSCOPIC (N/A)     Hospital Course: The patient was admitted from the ER for acute appendicitis. He underwent the above stated procedure and tolerated it well. He was started on clears and then advanced to a regular diet on POD# 1. His pain was controlled on oral medications. He met goals for discharged and will follow up in clinic in 2 weeks.    Consults:   Consults         Status Ordering Provider     Inpatient consult to General Surgery  Once     Provider:  (Not yet assigned)    REGIS Tam          Significant Diagnostic Studies: Radiology: CT scan: CT ABDOMEN PELVIS WITH CONTRAST:   Results for orders placed or performed during the hospital encounter of 11/21/17   CT Abdomen Pelvis With Contrast    Narrative    TECHNIQUE:   5mm axial images were acquired from the lung bases to the lesser trochanters after the administration of 75 cc of Omnipaque 350 intravenous contrast. Oral contrast was not " administered. Coronal and sagittal reconstructions were performed.    COMPARISON:   CT abdomen and pelvis 3/15/2014.      FINDINGS:     HEART AND PERICARDIUM: Unremarkable.    LUNG BASES: No pleural fluid or focal consolidation.    LIVER: Unremarkable.    GALLBLADDER AND BILIARY TREE: Unremarkable.    SPLEEN: Unremarkable.    PANCREAS: Unremarkable.    ADRENALS: Unremarkable.     STOMACH: Unremarkable.    SMALL AND LARGE BOWEL/APPENDIX: The appendix is visualized.  There is a large appendicolith with mild dilatation of the distal appendix/appendiceal tip with mild adjacent fat stranding.  Visualized loops of large and small bowel demonstrate no evidence of obstruction or inflammatory change.    PERITONEAL SPACE: No free intraperitoneal air or fluid.  Postsurgical clips about the anterior abdomen reflecting sequela of prior hernia repair.    KIDNEYS/URETERS/BLADDER: Prominent right extrarenal pelvis again noted. No obstructive uropathy. High density pelvic foci are likely vascular in nature.    REPRODUCTIVE: Unremarkable.    VASCULATURE: The abdominal aorta is nonaneurysmal.  No significant atherosclerosis.  There is a stable calcific focus at the dorsolateral aspect of the IVC, unchanged from prior studies.    LYMPH NODES: No definite lymphadenopathy.    OSSEOUS STRUCTURES: Unremarkable.    SOFT TISSUES: Extraperitoneal soft tissues are unremarkable.    Impression       Large appendicolith with associated mild dilatation of the distal appendix/appendiceal tip, which measures up to 0.8 cm with mild adjacent inflammatory change.  Findings could possibly represent early tip appendicitis in the correct clinical setting.  No evidence of perforation or abscess.      ______________________________________     Electronically signed by resident: EDE ZENG MD  Date:     11/21/17  Time:    05:29            As the supervising and teaching physician, I personally reviewed the images and resident's interpretation and I agree  with the findings.          Electronically signed by: PRASANNA MAIN  Date:     11/21/17  Time:    05:59        Pending Diagnostic Studies:     None        Final Active Diagnoses:    Diagnosis Date Noted POA    PRINCIPAL PROBLEM:  Total bilirubin, elevated [R17] 11/22/2017 Yes    Acute appendicitis [K35.80] 11/21/2017 Yes      Problems Resolved During this Admission:    Diagnosis Date Noted Date Resolved POA      Discharged Condition: good    Disposition: Home or Self Care    Follow Up:  Follow-up Information     Stacy Covington MD. Schedule an appointment as soon as possible for a visit in 2 weeks.    Specialties:  Surgery, Breast Surgery  Why:  For wound re-check/Office to call you w/Appointment.   Contact information:  [address] [facility] [place] [address]  [phone]                 Patient Instructions:     Diet general     Lifting restrictions   Order Comments: No lifting weights greater than 20 pounds for 4 weeks after surgery.     Weight bearing restrictions (specify)   Order Comments: No carrying weights greater than 20 pounds for 4 weeks after surgery.     Shower on day dressing removed (No bath)     Call MD for:  temperature >100.4     Call MD for:  persistent nausea and vomiting or diarrhea     Call MD for:  severe uncontrolled pain     Call MD for:  increased confusion or weakness     Call MD for:  worsening rash     Call MD for:  redness, tenderness, or signs of infection (pain, swelling, redness, odor or green/yellow discharge around incision site)     Remove dressing in 24 hours       Medications:  Reconciled Home Medications:   Discharge Medication List as of 11/22/2017 11:02 AM      START taking these medications    Details   docusate sodium (COLACE) 100 MG capsule Take 1 capsule (100 mg total) by mouth 2 (two) times daily., Starting Wed 11/22/2017, OTC      oxyCODONE-acetaminophen (PERCOCET) 5-325 mg per tablet Take 1 tablet by mouth every 4 (four) hours  as needed for Pain., Starting Tue 11/21/2017, Print         CONTINUE these medications which have NOT CHANGED    Details   dextroamphetamine-amphetamine 10 mg Tab Take 10 mg by mouth 2 (two) times daily., Starting Tue 6/6/2017, Print      erythromycin (ROMYCIN) ophthalmic ointment Apply 1/2 inch ribbon to the inner lower eyelid and then gently massage onto the eye with the eye closed., Print      fluconazole (DIFLUCAN) 200 MG Tab 1 po biw, Normal      LOCOID 0.1 % Lotn AAA bid, Normal             Stepan Jordan MD  General Surgery  [facility]

## 2017-11-23 NOTE — OP NOTE
DATE OF PROCEDURE: 11/21/2017     PREOPERATIVE DIAGNOSIS: Acute appendicitis.     POSTOPERATIVE DIAGNOSIS: Acute appendicitis.     PROCEDURE PERFORMED: Laparoscopic appendectomy.     ATTENDING SURGEON: Stacy Covington M.D.     HOUSESTAFF SURGEON: Stepan Jordan M.D. (RES)     ANESTHESIA: General endotracheal.     ESTIMATED BLOOD LOSS: 10ccs mL.     FINDINGS: Acute non-perforated appendicitis.     SPECIMEN: Appendix.     DRAINS: None.     COMPLICATIONS: None.     INDICATIONS: Juan Manuel Hdz is a 36 y.o.male who presented to the Emergency Department with lower abdominal pain. The history and exam were consistent with acute appendicitis, which was confirmed by laboratory studies and a CT scan. We recommended laparoscopic appendectomy and the patient agreed to proceed. The patient signed informed consent and expressed understanding of the risks and benefits of surgery.     OPERATIVE PROCEDURE: The patient was identified in Preoperative Holding and  brought back to the Operating Room. Placed supine on the operating table and padded appropriately. Monitors were applied and there was smooth induction of general endotracheal anesthesia. A Workman catheter was placed. The patient's abdomen was prepped and draped in the standard sterile surgical fashion. A time-out was performed and all team members present agreed this was the correct procedure on the correct patient. We also confirmed administration of appropriate preoperative antibiotics.    A 2-cm infraumbilical skin incision was made. Subcutaneous tissue was bluntly dissected. The umbilical stalk was grasped with penetrating towel clip and elevated and a 1.5-cm midline infraumbilical fascial incision was made. The abdomen was bluntly entered under direct vision. A Yahs balloon trocar was placed and the abdomen was insufflated with carbon dioxide to a maximum pressure of 15 mmHg. A 10-mm laparoscope was placed and the abdomen was examined. There was no evidence  of injury from the initial trocar placement. Two 5-mm trocars were placed under direct vision through separate stab incisions, one in the suprapubic area avoiding the dome of the bladder and one in the left lower quadrant avoiding the inferior epigastric artery. We directed our attention to the right lower quadrant. The appendix was identified and noted to have moderate inflammatory change with no evidence of perforation. The appendix was elevated. A mesenteric defect was made at the base of the appendix using the [place] dissector. The appendix was divided at the base using the Endo-NAY stapler with a blue (45-3.5) load. The mesoappendix was then divided with one white (45-2.5) loads of the stapler. The appendix was placed into an Endocatch bag and removed from the Yash trocar without difficulty. We returned the laparoscope and Yash trocar to the umbilicus and reexamined the right lower quadrant. The staple line on the mesoappendix was examined and some bleeding was noted. This was stopped with electrocautery. The base of the appendix was examined and appeared viable and well-sealed. The right lower quadrant was irrigated well with saline until the returning effluent was clear. All ports were removed under direct vision and no bleeding from any port site was noted. The insufflation of the abdomen was evacuated and the laparoscope and Yash trocar were removed. The fascial incision at the umbilical port site was closed with 0 Vicryl stitch in a figure of 8 fashion. All port sites were infiltrated with Marcaine and closed in a subcuticular fashion. Sterile dressings were applied. The patient's Workman catheter was removed. The patient was extubated in the Operating Room and transported to the Recovery Room in stable condition. All sponge, instrument and needle counts were correct at the end of the case. Dr. Covington was present and scrubbed for the entire procedure.

## 2017-11-27 ENCOUNTER — TELEPHONE (OUTPATIENT)
Dept: SURGERY | Facility: CLINIC | Age: 37
End: 2017-11-27

## 2017-11-27 NOTE — TELEPHONE ENCOUNTER
Returned call to patient regarding return to work note, all questions answered at this time, work release faxed per pt request and emailed at this time

## 2017-11-27 NOTE — TELEPHONE ENCOUNTER
----- Message from Bruce Morejon sent at 11/27/2017 12:29 PM CST -----  [phone]//pt states that he needs to speak with nurse in ref to getting a 's note to his employers by tomorrow//please call/thank you

## 2017-11-29 ENCOUNTER — TELEPHONE (OUTPATIENT)
Dept: SURGERY | Facility: CLINIC | Age: 37
End: 2017-11-29

## 2017-11-29 NOTE — TELEPHONE ENCOUNTER
----- Message from Anya Vera RN sent at 11/29/2017  1:16 PM CST -----  Contact: Tiffani, [employer]  Tiffani at [employer] just wants a quick clarification on the RTW letter that was sent: please call at [phone] when you can. Thanks!

## 2017-12-16 ENCOUNTER — OFFICE VISIT (OUTPATIENT)
Dept: URGENT CARE | Facility: CLINIC | Age: 37
End: 2017-12-16
Payer: COMMERCIAL

## 2017-12-16 VITALS
OXYGEN SATURATION: 99 % | DIASTOLIC BLOOD PRESSURE: 72 MMHG | WEIGHT: 175 LBS | HEIGHT: 70 IN | TEMPERATURE: 97 F | HEART RATE: 71 BPM | SYSTOLIC BLOOD PRESSURE: 119 MMHG | RESPIRATION RATE: 18 BRPM | BODY MASS INDEX: 25.05 KG/M2

## 2017-12-16 DIAGNOSIS — S61.211A LACERATION OF LEFT INDEX FINGER WITHOUT FOREIGN BODY WITHOUT DAMAGE TO NAIL, INITIAL ENCOUNTER: Primary | ICD-10-CM

## 2017-12-16 PROCEDURE — 12001 RPR S/N/AX/GEN/TRNK 2.5CM/<: CPT | Mod: S$GLB,,, | Performed by: NURSE PRACTITIONER

## 2017-12-16 PROCEDURE — 99213 OFFICE O/P EST LOW 20 MIN: CPT | Mod: 25,S$GLB,, | Performed by: NURSE PRACTITIONER

## 2017-12-16 RX ORDER — MUPIROCIN 20 MG/G
OINTMENT TOPICAL
Qty: 22 G | Refills: 1 | Status: SHIPPED | OUTPATIENT
Start: 2017-12-16 | End: 2018-05-30

## 2017-12-16 NOTE — PROGRESS NOTES
"Subjective:       Patient ID: Juan Manuel Hdz is a 37 y.o. male.    Vitals:  height is 5' 10" (1.778 m) and weight is 79.4 kg (175 lb). His oral temperature is 97.1 °F (36.2 °C). His blood pressure is 119/72 and his pulse is 71. His respiration is 18 and oxygen saturation is 99%.     Chief Complaint: Laceration    Laceration    The incident occurred 1 to 3 hours ago. The laceration is located on the left hand. The laceration is 2 cm in size. The laceration mechanism was a dirty knife. The pain is at a severity of 6/10. The pain is moderate. The pain has been constant since onset. His tetanus status is UTD.     ROS    Objective:      Physical Exam   Constitutional: He is oriented to person, place, and time. He appears well-developed and well-nourished.   HENT:   Head: Normocephalic and atraumatic. Head is without abrasion, without contusion and without laceration.   Right Ear: External ear normal.   Left Ear: External ear normal.   Nose: Nose normal.   Mouth/Throat: Oropharynx is clear and moist.   Eyes: Conjunctivae, EOM and lids are normal. Pupils are equal, round, and reactive to light.   Neck: Trachea normal, full passive range of motion without pain and phonation normal. Neck supple.   Cardiovascular: Normal rate, regular rhythm and normal heart sounds.    Pulmonary/Chest: Effort normal and breath sounds normal. No stridor. No respiratory distress.   Musculoskeletal: Normal range of motion.   Neurological: He is alert and oriented to person, place, and time.   Skin: Skin is warm and dry. Capillary refill takes less than 2 seconds. Laceration (1.5 cm linear laceration about volar surface of left index finger. distal phalanx) noted. No abrasion, no bruising, no burn, no ecchymosis, no lesion and no rash noted. No erythema.   Psychiatric: He has a normal mood and affect. His speech is normal and behavior is normal. Judgment and thought content normal. Cognition and memory are normal.   Nursing note and vitals " reviewed.      Laceration Repair  Date/Time: 2017 5:19 PM  Performed by: WILLOW COLLADO  Authorized by: WILLOW COLLADO   Consent Done: Yes  Consent: Verbal consent obtained.  Risks and benefits: risks, benefits and alternatives were discussed  Consent given by: patient  Patient understanding: patient states understanding of the procedure being performed  Patient consent: the patient's understanding of the procedure matches consent given  Procedure consent: procedure consent matches procedure scheduled  Site marked: the operative site was marked  Patient identity confirmed:  and name  Body area: upper extremity  Location details: left index finger  Laceration length: 1.5 cm  Foreign bodies: no foreign bodies  Tendon involvement: none  Nerve involvement: none  Vascular damage: no  Anesthesia: digital block    Anesthesia:  Local Anesthetic: lidocaine 1% without epinephrine  Anesthetic total: 4 mL  Patient sedated: no  Irrigation solution: saline  Irrigation method: syringe  Amount of cleaning: standard  Debridement: none  Degree of undermining: none  Skin closure: 4-0 nylon  Number of sutures: 3  Technique: simple  Approximation: close  Approximation difficulty: simple  Dressing: antibiotic ointment and dressing applied  Patient tolerance: Patient tolerated the procedure well with no immediate complications        Assessment:       1. Laceration of left index finger without foreign body without damage to nail, initial encounter        Plan:         Laceration of left index finger without foreign body without damage to nail, initial encounter  -     mupirocin (BACTROBAN) 2 % ointment; Apply to affected area 2 times daily  Dispense: 22 g; Refill: 1  -     LACERATION REPAIR      Patient Instructions     Extremity Laceration: Sutures, Staples, or Tape  A laceration is a cut through the skin. If it is deep, it may require stitches (sutures) or staples to close so it can heal. Minor cuts may be treated with surgical  tape closures.   X-rays may be done if something may have entered the skin through the cut. You may also need a tetanus shot if you are not up to date on this vaccination.  Home care  · Follow the health care providers instructions on how to care for the cut.  · Wash your hands with soap and warm water before and after caring for your wound. This is to help prevent infection.  · Keep the wound clean and dry. If a bandage was applied and it becomes wet or dirty, replace it. Otherwise, leave it in place for the first 24 hours, then change it once a day or as directed.  · If sutures or staples were used, clean the wound daily:  · After removing the bandage, wash the area with soap and water. Use a wet cotton swab to loosen and remove any blood or crust that forms.  · After cleaning, keep the wound clean and dry. Talk with your doctor before applying any antibiotic ointment to the wound. Reapply the bandage.  · You may remove the bandage to shower as usual after the first 24 hours, but do not soak the area in water (no swimming) until the stitches or staples are removed.  · If surgical tape closures were used, keep the area clean and dry. If it becomes wet, blot it dry with a towel.  · The doctor may prescribe an antibiotic cream or ointment to prevent infection. Do not stop taking this medication until you have finished the prescribed course or the doctor tells you to stop. The doctor may also prescribe medications for pain. Follow the doctors instructions for taking these medications.  · Avoid activities that may reopen your wound.  Follow-up care  Follow up with your health care provider. Most skin wounds heal within ten days. However, an infection may sometimes occur despite proper treatment. Therefore, check the wound daily for the signs of infection listed below. Stitches and staples should be removed within 7-14 days. If surgical tape closures were used, you may remove them after 10 days if they have not fallen  off by then.   When to seek medical advice  Call your health care provider right away if any of these occur:  · Wound bleeding not controlled by direct pressure  · Signs of infection, including increasing pain in the wound, increasing wound redness or swelling, or pus or bad odor coming from the wound  · Fever of 100.4°F (38ºC) or higher or as directed by your healthcare provider  · Stitches or staples come apart or fall out or surgical tape falls off before 7 days  · Wound edges re-open  · Wound changes colors  · Numbness around the wound   · Decreased movement around the injured area  Date Last Reviewed: 6/14/2015  © 4944-8542 [employer]. [address] [place] [address], [place] [address]. All rights reserved. This information is not intended as a substitute for professional medical care. Always follow your healthcare professional's instructions.

## 2017-12-16 NOTE — PATIENT INSTRUCTIONS
Extremity Laceration: Sutures, Staples, or Tape  A laceration is a cut through the skin. If it is deep, it may require stitches (sutures) or staples to close so it can heal. Minor cuts may be treated with surgical tape closures.   X-rays may be done if something may have entered the skin through the cut. You may also need a tetanus shot if you are not up to date on this vaccination.  Home care  · Follow the health care providers instructions on how to care for the cut.  · Wash your hands with soap and warm water before and after caring for your wound. This is to help prevent infection.  · Keep the wound clean and dry. If a bandage was applied and it becomes wet or dirty, replace it. Otherwise, leave it in place for the first 24 hours, then change it once a day or as directed.  · If sutures or staples were used, clean the wound daily:  · After removing the bandage, wash the area with soap and water. Use a wet cotton swab to loosen and remove any blood or crust that forms.  · After cleaning, keep the wound clean and dry. Talk with your doctor before applying any antibiotic ointment to the wound. Reapply the bandage.  · You may remove the bandage to shower as usual after the first 24 hours, but do not soak the area in water (no swimming) until the stitches or staples are removed.  · If surgical tape closures were used, keep the area clean and dry. If it becomes wet, blot it dry with a towel.  · The doctor may prescribe an antibiotic cream or ointment to prevent infection. Do not stop taking this medication until you have finished the prescribed course or the doctor tells you to stop. The doctor may also prescribe medications for pain. Follow the doctors instructions for taking these medications.  · Avoid activities that may reopen your wound.  Follow-up care  Follow up with your health care provider. Most skin wounds heal within ten days. However, an infection may sometimes occur despite proper treatment.  Therefore, check the wound daily for the signs of infection listed below. Stitches and staples should be removed within 7-14 days. If surgical tape closures were used, you may remove them after 10 days if they have not fallen off by then.   When to seek medical advice  Call your health care provider right away if any of these occur:  · Wound bleeding not controlled by direct pressure  · Signs of infection, including increasing pain in the wound, increasing wound redness or swelling, or pus or bad odor coming from the wound  · Fever of 100.4°F (38ºC) or higher or as directed by your healthcare provider  · Stitches or staples come apart or fall out or surgical tape falls off before 7 days  · Wound edges re-open  · Wound changes colors  · Numbness around the wound   · Decreased movement around the injured area  Date Last Reviewed: 6/14/2015  © 0733-1211 [employer]. [address] [place] [address], [place] [address]. All rights reserved. This information is not intended as a substitute for professional medical care. Always follow your healthcare professional's instructions.

## 2018-01-23 ENCOUNTER — PATIENT MESSAGE (OUTPATIENT)
Dept: RESEARCH | Facility: HOSPITAL | Age: 38
End: 2018-01-23

## 2018-02-08 ENCOUNTER — OFFICE VISIT (OUTPATIENT)
Dept: PSYCHIATRY | Facility: CLINIC | Age: 38
End: 2018-02-08
Payer: COMMERCIAL

## 2018-02-08 VITALS — RESPIRATION RATE: 15 BRPM | HEIGHT: 69 IN

## 2018-02-08 DIAGNOSIS — R00.2 PALPITATIONS: Chronic | ICD-10-CM

## 2018-02-08 DIAGNOSIS — F90.0 ATTENTION DEFICIT HYPERACTIVITY DISORDER (ADHD), PREDOMINANTLY INATTENTIVE TYPE: Primary | ICD-10-CM

## 2018-02-08 PROCEDURE — 99212 OFFICE O/P EST SF 10 MIN: CPT | Performed by: PSYCHIATRY & NEUROLOGY

## 2018-02-08 PROCEDURE — 99999 PR PBB SHADOW E&M-EST. PATIENT-LVL II: CPT | Mod: PBBFAC,,, | Performed by: PSYCHIATRY & NEUROLOGY

## 2018-02-08 PROCEDURE — 99213 OFFICE O/P EST LOW 20 MIN: CPT | Mod: S$GLB,,, | Performed by: PSYCHIATRY & NEUROLOGY

## 2018-02-08 PROCEDURE — 3008F BODY MASS INDEX DOCD: CPT | Mod: S$GLB,,, | Performed by: PSYCHIATRY & NEUROLOGY

## 2018-02-08 RX ORDER — DEXTROAMPHETAMINE SACCHARATE, AMPHETAMINE ASPARTATE, DEXTROAMPHETAMINE SULFATE AND AMPHETAMINE SULFATE 2.5; 2.5; 2.5; 2.5 MG/1; MG/1; MG/1; MG/1
10 TABLET ORAL 2 TIMES DAILY
Qty: 60 TABLET | Refills: 0 | Status: SHIPPED | OUTPATIENT
Start: 2018-02-08 | End: 2018-02-08 | Stop reason: SDUPTHER

## 2018-02-08 RX ORDER — DEXTROAMPHETAMINE SACCHARATE, AMPHETAMINE ASPARTATE, DEXTROAMPHETAMINE SULFATE AND AMPHETAMINE SULFATE 2.5; 2.5; 2.5; 2.5 MG/1; MG/1; MG/1; MG/1
10 TABLET ORAL 2 TIMES DAILY
Qty: 60 TABLET | Refills: 0 | Status: SHIPPED | OUTPATIENT
Start: 2018-03-08 | End: 2018-02-08 | Stop reason: SDUPTHER

## 2018-02-08 RX ORDER — DEXTROAMPHETAMINE SACCHARATE, AMPHETAMINE ASPARTATE, DEXTROAMPHETAMINE SULFATE AND AMPHETAMINE SULFATE 2.5; 2.5; 2.5; 2.5 MG/1; MG/1; MG/1; MG/1
10 TABLET ORAL 2 TIMES DAILY
Qty: 60 TABLET | Refills: 0 | Status: SHIPPED | OUTPATIENT
Start: 2018-04-08 | End: 2018-05-14 | Stop reason: SDUPTHER

## 2018-02-08 NOTE — PROGRESS NOTES
ESTABLISHED OUTPATIENT VISIT   E/M LEVEL 3: [id number]    ENCOUNTER DATE: 2/8/2018  SITE: [facility]      HISTORY    CHIEF COMPLAINT   Juan Manuel Hdz is a 37 y.o. male who presents for followup of   Chief Complaint   Patient presents with    Inattention    Distractibilty       HPI   (Elements: Location, Quality, Severity, Duration, Timing, Content, Modifying Factors, Associated Signs & Symptoms)    Patient with history of ADHD, here for disease maintenance.  He is here for follow up appointment.  He was last seen in April 2017 by me - did see NP in October 2017.  He remains on adderall, though recently ran out.  [place]  site reviewed - no suspicious activity.  He finds the 10mg tabs are not as effective as in the past - at times will take 30mg or even 40mg at work - but usually doesn't run out too early as he doesn't take the medication on the weekend (did run out 2 weeks early one month, but adjusted).  His prescribed dose is 10mg bid.   Primary symptoms of ADHD are procrastination and distractibility - adderall helpful.  No problems when on adderall - no cardiac issues, depression, insomnia, hallucinations, tics/tremors.  He does find his appetite is mildly decreased on adderall.  He reports social alcohol - recently only once every two weeks since he started dating new girlfriend - they started dating 5 months ago and living together 3 months ago - he is hopeful it might progress to marriage.  Work is fine.  He recently had an appendectomy and was seen in urgent care for finger laceration.    12/7/16 - off adderall for some time, worsening procrastination/distraction at work, adderall restarted 10mg bid  10/8/15 - stable, no med changes  7/10/15 - seen by resident, stable, no med changes  9/25/14 - stable, no med changes    ROS   Cardiovascular ROS: negative for - chest pain/palpitations    Respiratory ROS: no shortness of breath  Musculoskeletal ROS: shoulder pain  GI ROS: no  "N/V/D/C or abd pain s/p appendectomy      PFSH  Past Medical History reviewed: Yes  Family History reviewed: Yes  Social History reviewed: No      PSYCHOTROPIC MEDICATIONS   Adderall 10mg bid - sometimes takes an additional 10mg or 20mg at work - but doesn't take on weekends    EXAMINATION    VITALS   Most recent vital signs, dated less than 90 days prior to this appointment, were reviewed.   Vitals:    02/08/18 1735   Resp: 15       CONSTITUTIONAL  General Appearance: stated age, casually dressed, beard    MUSCULOSKELETAL  Muscle Strength and Tone: no weakness or spasticity  Abnormal Involuntary Movements: no tremor or tic noted  Gait and Station: ambulates without assistance    PSYCHIATRIC   Level of Consciousness: alert  Orientation: to person, place, time  Grooming: well groomed  Psychomotor Behavior: no retardation or agitation  Speech: conversational, spontaneous  Language: fluent english, repeats words/phrases  Mood: "fine"  Affect: euthymic, pleasant, subdued  Thought Process: linear  Associations: intact, no loosening of associations  Thought Content: no SI  Memory: intact  Attention: +distractibility noted  Fund of Knowledge: intact  Insight: intact  Judgment: intact    MEDICAL DECISION MAKING    DIAGNOSES  ADHD    PROBLEM LIST AND MANAGEMENT PLANS    New problem(s) (3 points each):   - none    Established problem(s), worsening (2 points each):   - none    Established problem(s), stable/improved (1 point each):  - ADHD.  cont Adderall at current dose of 10mg bid #60 per month.  [place] prescription monitoring site checked - no problems noted  - palpitations.  An issue in fall 2013 but not currently.  Cardiology cleared him to take stimulants.  QTc wnl.  - work/relationship problems.  Currently not a problem.  I did check in with him about these during session.        PRESCRIPTION DRUG MANAGEMENT  Compliance: currently taking meds as prescribed  Side Effects: none save mild appetite loss  Regimen Adjustments: " cont Adderall 10mg bid      DIAGNOSTIC TESTING  EKG 11/13/13 - QTc 397  11/22/17 - CBC, BMP reviewed  11/21/17 - CMP, lipase reviewed    David Kelly

## 2018-02-19 ENCOUNTER — PATIENT MESSAGE (OUTPATIENT)
Dept: RESEARCH | Facility: HOSPITAL | Age: 38
End: 2018-02-19

## 2018-05-14 DIAGNOSIS — F90.0 ATTENTION DEFICIT HYPERACTIVITY DISORDER (ADHD), PREDOMINANTLY INATTENTIVE TYPE: ICD-10-CM

## 2018-05-14 RX ORDER — DEXTROAMPHETAMINE SACCHARATE, AMPHETAMINE ASPARTATE, DEXTROAMPHETAMINE SULFATE AND AMPHETAMINE SULFATE 2.5; 2.5; 2.5; 2.5 MG/1; MG/1; MG/1; MG/1
10 TABLET ORAL 2 TIMES DAILY
Qty: 60 TABLET | Refills: 0 | Status: SHIPPED | OUTPATIENT
Start: 2018-05-14 | End: 2018-06-21 | Stop reason: SDUPTHER

## 2018-05-30 ENCOUNTER — OFFICE VISIT (OUTPATIENT)
Dept: PODIATRY | Facility: CLINIC | Age: 38
End: 2018-05-30
Payer: COMMERCIAL

## 2018-05-30 VITALS
WEIGHT: 175 LBS | BODY MASS INDEX: 25.92 KG/M2 | HEART RATE: 59 BPM | DIASTOLIC BLOOD PRESSURE: 73 MMHG | HEIGHT: 69 IN | SYSTOLIC BLOOD PRESSURE: 119 MMHG

## 2018-05-30 DIAGNOSIS — B35.1 DERMATOPHYTOSIS OF NAIL: Primary | ICD-10-CM

## 2018-05-30 PROCEDURE — 99999 PR PBB SHADOW E&M-EST. PATIENT-LVL III: CPT | Mod: PBBFAC,,, | Performed by: PODIATRIST

## 2018-05-30 PROCEDURE — 99204 OFFICE O/P NEW MOD 45 MIN: CPT | Mod: S$GLB,,, | Performed by: PODIATRIST

## 2018-05-30 PROCEDURE — 3008F BODY MASS INDEX DOCD: CPT | Mod: CPTII,S$GLB,, | Performed by: PODIATRIST

## 2018-05-30 RX ORDER — TERBINAFINE HYDROCHLORIDE 250 MG/1
250 TABLET ORAL DAILY
Qty: 30 TABLET | Refills: 0 | Status: SHIPPED | OUTPATIENT
Start: 2018-05-30 | End: 2018-07-30 | Stop reason: SDUPTHER

## 2018-05-30 NOTE — PROGRESS NOTES
Chief Complaint   Patient presents with    Nail Problem           HPI:   Juan Manuel Hdz is a 37 y.o. male who presents to clinic with complaints of fungal toenails.  Patient states nails have been abnormal since years.  Has tried Lamisil po about seven years ago with improvement but now has recurred on bilateral hallux.   Pt requests treatment options.      Past Medical History:   Diagnosis Date    ADHD (attention deficit hyperactivity disorder)     Allergy     seasonal    AR (allergic rhinitis)     History of psychiatric care     Psychiatric problem     Reactive airway disease     Therapy            Current Outpatient Prescriptions on File Prior to Visit   Medication Sig Dispense Refill    dextroamphetamine-amphetamine 10 mg Tab Take 10 mg by mouth 2 (two) times daily. 60 tablet 0    [DISCONTINUED] docusate sodium (COLACE) 100 MG capsule Take 1 capsule (100 mg total) by mouth 2 (two) times daily.  0    [DISCONTINUED] erythromycin (ROMYCIN) ophthalmic ointment Apply 1/2 inch ribbon to the inner lower eyelid and then gently massage onto the eye with the eye closed. 1 Tube 0    [DISCONTINUED] fluconazole (DIFLUCAN) 200 MG Tab 1 po biw 8 tablet 0    [DISCONTINUED] LOCOID 0.1 % Lotn AAA bid 1 Bottle 3    [DISCONTINUED] mupirocin (BACTROBAN) 2 % ointment Apply to affected area 2 times daily 22 g 1    [DISCONTINUED] oxyCODONE-acetaminophen (PERCOCET) 5-325 mg per tablet Take 1 tablet by mouth every 4 (four) hours as needed for Pain. 42 tablet 0     No current facility-administered medications on file prior to visit.           ALLG:  Review of patient's allergies indicates:  No Known Allergies        Social History     Social History    Marital status: Single     Spouse name: N/A    Number of children: N/A    Years of education: N/A     Occupational History     [employer]     Social History Main Topics    Smoking status: Never Smoker    Smokeless tobacco: Never Used    Alcohol use 0.5  "oz/week     1 Standard drinks or equivalent per week      Comment: occasionally    Drug use: No    Sexual activity: Not on file     Other Topics Concern    Not on file     Social History Narrative    Works at [employer] no kids             ROS:    General ROS: negative for - chills, fatigue or fever  Cardiovascular ROS: no chest pain or dyspnea on exertion  Musculoskeletal ROS: negative for - joint pain or joint stiffness   Skin: Negative for rash, Positive for nail or hair changes.  no itching.       EXAM:   Vitals:    05/30/18 1538   BP: 119/73   Pulse: (!) 59   Weight: 79.4 kg (175 lb)   Height: 5' 9" (1.753 m)        General:  alert, no distress, cooperative    Vasc:  Pedal pulses present 2+  Neuro Motor:  Light touch and Sharp/dull sensation:  intact to light touch  Derm: onychomycosis of the toenails and yellow dystrophic nails of bilateral hallux.    No presence of pigment involving the periungual skin.   Msk:  5/5 muscle strength.   Right foot: no gross deformity   Left foot: no gross deformity      ASSESSMENT / PLAN:     I counseled the patient on the patient's conditions, their implications and medical management.       Dermatophytosis of nail  -     terbinafine HCl (LAMISIL) 250 mg tablet; Take 1 tablet (250 mg total) by mouth once daily. 1 pill by mouth.  Avoid alcohol intake while taking medication  Dispense: 30 tablet; Refill: 0  -     Hepatic function panel; Future; Expected date: 06/30/2018            Discussed treatment options with patient including benefits and risks of p.o. Lamisil (75% patients clear but 50% recur within 2 years, hepatotoxicity) vs topical treatments.  Check liver function tests (LFT's)  after approximately 4-6 weeks of therapy.     May also consider "Kerasal Nail" for toenails.  Available over the counter.   "

## 2018-05-31 ENCOUNTER — OFFICE VISIT (OUTPATIENT)
Dept: URGENT CARE | Facility: CLINIC | Age: 38
End: 2018-05-31
Payer: COMMERCIAL

## 2018-05-31 VITALS
HEART RATE: 62 BPM | TEMPERATURE: 98 F | OXYGEN SATURATION: 100 % | SYSTOLIC BLOOD PRESSURE: 116 MMHG | BODY MASS INDEX: 25.92 KG/M2 | DIASTOLIC BLOOD PRESSURE: 78 MMHG | WEIGHT: 175 LBS | HEIGHT: 69 IN

## 2018-05-31 DIAGNOSIS — M94.0 ACUTE COSTOCHONDRITIS: Primary | ICD-10-CM

## 2018-05-31 PROCEDURE — 3008F BODY MASS INDEX DOCD: CPT | Mod: CPTII,S$GLB,, | Performed by: FAMILY MEDICINE

## 2018-05-31 PROCEDURE — 99214 OFFICE O/P EST MOD 30 MIN: CPT | Mod: 25,S$GLB,, | Performed by: FAMILY MEDICINE

## 2018-05-31 PROCEDURE — 96372 THER/PROPH/DIAG INJ SC/IM: CPT | Mod: S$GLB,,, | Performed by: FAMILY MEDICINE

## 2018-05-31 RX ORDER — IBUPROFEN 800 MG/1
800 TABLET ORAL EVERY 8 HOURS PRN
Qty: 21 TABLET | Refills: 0 | Status: SHIPPED | OUTPATIENT
Start: 2018-05-31 | End: 2019-05-31

## 2018-05-31 RX ORDER — KETOROLAC TROMETHAMINE 30 MG/ML
30 INJECTION, SOLUTION INTRAMUSCULAR; INTRAVENOUS
Status: COMPLETED | OUTPATIENT
Start: 2018-05-31 | End: 2018-05-31

## 2018-05-31 RX ADMIN — KETOROLAC TROMETHAMINE 30 MG: 30 INJECTION, SOLUTION INTRAMUSCULAR; INTRAVENOUS at 11:05

## 2018-05-31 NOTE — PATIENT INSTRUCTIONS
Costochondritis    Costochondritis is inflammation of a rib or the cartilage that connects a rib to your breastbone (sternum). It causes tenderness, and sometimes chest pain may be sharp or aching, or it may feel like pressure. Pain may get worse with deep breathing, movement, or exercise. In some cases, the pain is mistaken for a heart attack. Despite this, the condition is not serious. Read on to learn more about the condition and how it can be treated.  What causes costochondritis?  The cause of costochondritis is not completely clear, but it may happen after a chest injury, chest infection, or coughing episode. Some physical activities can sometimes lead to costochondritis. Large-breasted women may be more likely to have the condition. Often, the reason for the inflammation is unknown.  Diagnosing costochondritis  There is no test for costochondritis. The condition is diagnosed by the symptoms you have. Your healthcare provider will perform a physical exam. He or she will ask you about your symptoms and examine your chest for tenderness. In some cases, tests are done to rule out more serious problems. These tests may include imaging tests such as chest X-ray, CT scan, or an ECG.  Treating costochondritis  If an underlying cause is found, treatment for that will likely relieve the problem. Costochondritis often goes away on its own. The course of the condition varies from person to person. It usually lasts from weeks to months. In some cases, mild symptoms continue for months to years. To ease symptoms:  · Take medicine as directed. These relieve pain and swelling. Ibuprofen or other NSAIDs are often recommended. In some cases, you may be given prescription medicine, such as muscle relaxants.  · Avoid activities that put stress on the chest or spine.  · Apply a heating pad (set to warm, not too high, heat) to the breastbone several times a day.  · Perform stretching exercises as directed.  Call the healthcare  provider right away if you have any of the following:  · Pain that is not relieved by medicine  · Shortness of breath  · Lightheadedness, dizziness, or fainting  · Feeling of irregular heartbeat or fast pulse  Anyone with chest pain should see a healthcare provider, especially those who are older and may be at risk for heart disease.   Date Last Reviewed: 10/1/2016  © 9595-0624 [employer]. [address] [place] [address], [place] [address]. All rights reserved. This information is not intended as a substitute for professional medical care. Always follow your healthcare professional's instructions.

## 2018-05-31 NOTE — PROGRESS NOTES
"Subjective:       Patient ID: Juan Manuel Hdz is a 37 y.o. male.    Vitals:  height is 5' 9" (1.753 m) and weight is 79.4 kg (175 lb). His oral temperature is 97.7 °F (36.5 °C). His blood pressure is 116/78 and his pulse is 62. His oxygen saturation is 100%.     Chief Complaint: Shortness of Breath    Pt c/o shortness of breath and pain in the ribs for 2 hours, worse when laying down.       Shortness of Breath   This is a new problem. The current episode started today. The problem occurs constantly. The problem has been gradually worsening. Pertinent negatives include no abdominal pain, chest pain, ear pain, fever, headaches, leg swelling, sore throat, sputum production or wheezing. Nothing aggravates the symptoms. He has tried nothing for the symptoms. The treatment provided no relief. His past medical history is significant for asthma.     Review of Systems   Constitution: Negative for chills, fever and malaise/fatigue.   HENT: Negative for congestion, ear pain, hoarse voice and sore throat.    Eyes: Negative for discharge and redness.   Cardiovascular: Negative for chest pain, dyspnea on exertion and leg swelling.   Respiratory: Positive for shortness of breath. Negative for cough, sputum production and wheezing.    Musculoskeletal: Negative for myalgias.   Gastrointestinal: Negative for abdominal pain and nausea.   Neurological: Negative for headaches.       Objective:      Physical Exam   Constitutional: He appears well-developed and well-nourished.   HENT:   Head: Normocephalic and atraumatic.   Eyes: EOM are normal. Pupils are equal, round, and reactive to light.   Neck: Normal range of motion.   Cardiovascular: Normal rate, regular rhythm and normal heart sounds.  Exam reveals no gallop and no friction rub.    No murmur heard.  Pulmonary/Chest: Effort normal and breath sounds normal. No respiratory distress. He has no wheezes. He has no rales. He exhibits tenderness (anterior left mid clavicular chest " wall below nipple).   Nursing note and vitals reviewed.      Assessment:       1. Acute costochondritis        Plan:         Acute costochondritis  -     X-Ray Chest PA And Lateral; Future; Expected date: 05/31/2018  -     ketorolac injection 30 mg; Inject 1 mL (30 mg total) into the muscle one time.  -     ibuprofen (ADVIL,MOTRIN) 800 MG tablet; Take 1 tablet (800 mg total) by mouth every 8 (eight) hours as needed for Pain (with food).  Dispense: 21 tablet; Refill: 0

## 2018-06-21 DIAGNOSIS — F90.0 ATTENTION DEFICIT HYPERACTIVITY DISORDER (ADHD), PREDOMINANTLY INATTENTIVE TYPE: ICD-10-CM

## 2018-06-21 RX ORDER — DEXTROAMPHETAMINE SACCHARATE, AMPHETAMINE ASPARTATE, DEXTROAMPHETAMINE SULFATE AND AMPHETAMINE SULFATE 2.5; 2.5; 2.5; 2.5 MG/1; MG/1; MG/1; MG/1
10 TABLET ORAL 2 TIMES DAILY
Qty: 60 TABLET | Refills: 0 | Status: SHIPPED | OUTPATIENT
Start: 2018-06-21 | End: 2018-08-01 | Stop reason: SDUPTHER

## 2018-07-20 ENCOUNTER — TELEPHONE (OUTPATIENT)
Dept: PODIATRY | Facility: CLINIC | Age: 38
End: 2018-07-20

## 2018-07-20 NOTE — TELEPHONE ENCOUNTER
----- Message from Eva Cox sent at 7/20/2018  3:34 PM CDT -----  Contact: [email or web address]  Rx Refill/Request     Is this a Refill or New Rx:  Refill  Rx Name and Strength:  Lamsil   Preferred Pharmacy with phone number:    Communication Preference:  Additional Information:

## 2018-07-23 ENCOUNTER — TELEPHONE (OUTPATIENT)
Dept: PODIATRY | Facility: CLINIC | Age: 38
End: 2018-07-23

## 2018-07-23 DIAGNOSIS — B35.1 DERMATOPHYTOSIS OF NAIL: ICD-10-CM

## 2018-07-28 ENCOUNTER — LAB VISIT (OUTPATIENT)
Dept: LAB | Facility: HOSPITAL | Age: 38
End: 2018-07-28
Attending: PODIATRIST
Payer: COMMERCIAL

## 2018-07-28 DIAGNOSIS — B35.1 DERMATOPHYTOSIS OF NAIL: ICD-10-CM

## 2018-07-28 LAB
ALBUMIN SERPL BCP-MCNC: 4.1 G/DL
ALP SERPL-CCNC: 50 U/L
ALT SERPL W/O P-5'-P-CCNC: 17 U/L
AST SERPL-CCNC: 20 U/L
BILIRUB DIRECT SERPL-MCNC: 0.5 MG/DL
BILIRUB SERPL-MCNC: 1.3 MG/DL
PROT SERPL-MCNC: 6.7 G/DL

## 2018-07-28 PROCEDURE — 36415 COLL VENOUS BLD VENIPUNCTURE: CPT

## 2018-07-28 PROCEDURE — 80076 HEPATIC FUNCTION PANEL: CPT

## 2018-07-30 DIAGNOSIS — B35.1 DERMATOPHYTOSIS OF NAIL: ICD-10-CM

## 2018-07-30 RX ORDER — TERBINAFINE HYDROCHLORIDE 250 MG/1
250 TABLET ORAL DAILY
Qty: 30 TABLET | Refills: 0 | Status: SHIPPED | OUTPATIENT
Start: 2018-07-30 | End: 2018-09-12 | Stop reason: SDUPTHER

## 2018-08-01 DIAGNOSIS — F90.0 ATTENTION DEFICIT HYPERACTIVITY DISORDER (ADHD), PREDOMINANTLY INATTENTIVE TYPE: ICD-10-CM

## 2018-08-01 RX ORDER — DEXTROAMPHETAMINE SACCHARATE, AMPHETAMINE ASPARTATE, DEXTROAMPHETAMINE SULFATE AND AMPHETAMINE SULFATE 2.5; 2.5; 2.5; 2.5 MG/1; MG/1; MG/1; MG/1
10 TABLET ORAL 2 TIMES DAILY
Qty: 60 TABLET | Refills: 0 | Status: SHIPPED | OUTPATIENT
Start: 2018-08-01 | End: 2018-08-30

## 2018-08-30 ENCOUNTER — OFFICE VISIT (OUTPATIENT)
Dept: PSYCHIATRY | Facility: CLINIC | Age: 38
End: 2018-08-30
Payer: COMMERCIAL

## 2018-08-30 VITALS
BODY MASS INDEX: 25.18 KG/M2 | HEART RATE: 78 BPM | DIASTOLIC BLOOD PRESSURE: 70 MMHG | SYSTOLIC BLOOD PRESSURE: 138 MMHG | HEIGHT: 69 IN | WEIGHT: 170 LBS

## 2018-08-30 DIAGNOSIS — M54.16 LUMBAR RADICULOPATHY: ICD-10-CM

## 2018-08-30 DIAGNOSIS — F90.0 ATTENTION DEFICIT HYPERACTIVITY DISORDER (ADHD), PREDOMINANTLY INATTENTIVE TYPE: Primary | ICD-10-CM

## 2018-08-30 DIAGNOSIS — Z86.79 HISTORY OF PALPITATIONS IN ADULTHOOD: ICD-10-CM

## 2018-08-30 PROCEDURE — 3008F BODY MASS INDEX DOCD: CPT | Mod: CPTII,S$GLB,, | Performed by: PSYCHIATRY & NEUROLOGY

## 2018-08-30 PROCEDURE — 99214 OFFICE O/P EST MOD 30 MIN: CPT | Mod: S$GLB,,, | Performed by: PSYCHIATRY & NEUROLOGY

## 2018-08-30 PROCEDURE — 99999 PR PBB SHADOW E&M-EST. PATIENT-LVL III: CPT | Mod: PBBFAC,,, | Performed by: PSYCHIATRY & NEUROLOGY

## 2018-08-30 RX ORDER — DEXTROAMPHETAMINE SACCHARATE, AMPHETAMINE ASPARTATE, DEXTROAMPHETAMINE SULFATE AND AMPHETAMINE SULFATE 5; 5; 5; 5 MG/1; MG/1; MG/1; MG/1
1 TABLET ORAL 2 TIMES DAILY
Qty: 60 TABLET | Refills: 0 | Status: SHIPPED | OUTPATIENT
Start: 2018-08-30 | End: 2018-08-30 | Stop reason: SDUPTHER

## 2018-08-30 RX ORDER — DEXTROAMPHETAMINE SACCHARATE, AMPHETAMINE ASPARTATE, DEXTROAMPHETAMINE SULFATE AND AMPHETAMINE SULFATE 5; 5; 5; 5 MG/1; MG/1; MG/1; MG/1
1 TABLET ORAL 2 TIMES DAILY
Qty: 60 TABLET | Refills: 0 | Status: SHIPPED | OUTPATIENT
Start: 2018-10-30 | End: 2019-01-23 | Stop reason: SDUPTHER

## 2018-08-30 RX ORDER — DEXTROAMPHETAMINE SACCHARATE, AMPHETAMINE ASPARTATE, DEXTROAMPHETAMINE SULFATE AND AMPHETAMINE SULFATE 2.5; 2.5; 2.5; 2.5 MG/1; MG/1; MG/1; MG/1
10 TABLET ORAL 2 TIMES DAILY
Qty: 60 TABLET | Refills: 0 | Status: CANCELLED | OUTPATIENT
Start: 2018-08-30

## 2018-08-30 RX ORDER — DEXTROAMPHETAMINE SACCHARATE, AMPHETAMINE ASPARTATE, DEXTROAMPHETAMINE SULFATE AND AMPHETAMINE SULFATE 5; 5; 5; 5 MG/1; MG/1; MG/1; MG/1
1 TABLET ORAL 2 TIMES DAILY
Qty: 60 TABLET | Refills: 0 | Status: SHIPPED | OUTPATIENT
Start: 2018-09-30 | End: 2018-08-30 | Stop reason: SDUPTHER

## 2018-08-30 NOTE — PROGRESS NOTES
ESTABLISHED OUTPATIENT VISIT   E/M LEVEL 4: [id number]    ENCOUNTER DATE: 8/30/2018  SITE: [facility], [facility]      HISTORY    CHIEF COMPLAINT   Juan Manuel Hdz is a 37 y.o. male who presents for followup of   Chief Complaint   Patient presents with    Inattention    Distractibilty       HPI   (Elements: Location, Quality, Severity, Duration, Timing, Content, Modifying Factors, Associated Signs & Symptoms)    Patient with history of ADHD, here for disease maintenance.  He is here for follow up appointment.  Last session he reported that 10mg bid was not having full effect, which continues to be the case.  He has been taking 20mg bid but then will run out early.  He reports attention/focus is 2/10 on no adderall, 5/10 on 10mg bid, and 8/10 on 20mg bid.  He occasionally takes it on weekends.  He finds he is much more productive with less procrastinating on 20mg bid.  No issues with the medication - denies depression, anxiety, paranoia, AH/VH, tics, skin picking, insomnia.  He does note the adderall curbs his appetite but he reports weight as stable.  Weight today is 169lbs in office.  No cardiac issues.  Work going well.  He is now engaged and expecting baby in February 2019.  No new issues.          2/8/18 - partial effect with 20mg of adderall - taking 30 or even 40mg some days, dose continued at 10mg bid  12/7/16 - off adderall for some time, worsening procrastination/distraction at work, adderall restarted 10mg bid  10/8/15 - stable, no med changes  7/10/15 - seen by resident, stable, no med changes  9/25/14 - stable, no med changes    ROS   Cardiovascular ROS: negative for - chest pain/palpitations    Respiratory ROS: no shortness of breath  Musculoskeletal ROS: back pain - chronic  GI ROS: no N/V      PFSH  Past Medical History reviewed: Yes  Family History reviewed: Yes  Social History reviewed: No      PSYCHOTROPIC MEDICATIONS   Adderall 10mg bid - now tking an additional 10mg or 20mg at  "work     EXAMINATION    VITALS   Most recent vital signs, dated less than 90 days prior to this appointment, were reviewed.   Vitals:    08/30/18 1608   BP: 138/70   Pulse: 78       CONSTITUTIONAL  General Appearance: stated age, casually dressed    MUSCULOSKELETAL  Muscle Strength and Tone: no weakness or spasticity  Abnormal Involuntary Movements: no tremor or tic noted  Gait and Station: ambulates without assistance    PSYCHIATRIC   Level of Consciousness: alert  Orientation: to person, place, time  Grooming: well groomed  Psychomotor Behavior: no retardation or agitation  Speech: conversational, spontaneous  Language: fluent english, repeats words/phrases  Mood: "fine"  Affect: euthymic, pleasant, subdued  Thought Process: linear  Associations: intact, no loosening of associations  Thought Content: no SI  Memory: intact  Attention: +distractibility noted  Fund of Knowledge: intact  Insight: intact  Judgment: intact    MEDICAL DECISION MAKING    DIAGNOSES  ADHD    PROBLEM LIST AND MANAGEMENT PLANS    New problem(s) (3 points each):   - none    Established problem(s), worsening (2 points each):   - none    Established problem(s), stable/improved (1 point each):  - ADHD.  Current dose of Adderall is only partially effective.  No concerns for abuse.  Will titrate dose to 20mg bid #60 per month.  [place] monitoring site checked - no problems noted  - palpitations.  An issue in fall 2013 but not currently.  Cardiology cleared him to take stimulants.  QTc wnl.  - work/relationship problems.  Currently not a problem - things actually going well currently in his life.  I did check in with him about these during session.        PRESCRIPTION DRUG MANAGEMENT  Compliance: currently taking meds as prescribed  Side Effects: none save mild appetite loss  Regimen Adjustments: titrate Adderall to 20mg bid      DIAGNOSTIC TESTING  EKG 11/13/13 - QTc 397  7/28/18 - [facility] reviewed      David Kelly          "

## 2018-09-12 ENCOUNTER — TELEPHONE (OUTPATIENT)
Dept: PODIATRY | Facility: CLINIC | Age: 38
End: 2018-09-12

## 2018-09-12 DIAGNOSIS — B35.1 DERMATOPHYTOSIS OF NAIL: ICD-10-CM

## 2018-09-12 RX ORDER — TERBINAFINE HYDROCHLORIDE 250 MG/1
250 TABLET ORAL DAILY
Qty: 30 TABLET | Refills: 0 | Status: SHIPPED | OUTPATIENT
Start: 2018-09-12 | End: 2020-09-23

## 2018-09-12 NOTE — TELEPHONE ENCOUNTER
----- Message from [facility] sent at 9/12/2018  4:07 PM CDT -----  Contact: Self/  [phone]  Rx Refill/Request     Is this a Refill or New Rx: Refill.     Rx Name and Strength: terbinafine HCl (LAMISIL) 250 mg tablet.    Preferred Pharmacy with phone number: [phone].     Communication Preference: [phone].

## 2018-09-12 NOTE — TELEPHONE ENCOUNTER
Spoke to patient and advice him Dr. Mac is out and Dr. Tom will take care of it, but first he needs to do a LFT in order to continue the medication, Dr. Tom send the prescription to pharmacy and will call patient back with the test result patient will do labs 9/13/18 in the afternoon.

## 2018-10-12 ENCOUNTER — TELEPHONE (OUTPATIENT)
Dept: PODIATRY | Facility: CLINIC | Age: 38
End: 2018-10-12

## 2018-10-12 NOTE — TELEPHONE ENCOUNTER
Called Patient and advice him that he still have lab work pending LFT's and needs to have done in order to keep taking meds.  [place] and he will have done soon.

## 2018-10-19 ENCOUNTER — OFFICE VISIT (OUTPATIENT)
Dept: URGENT CARE | Facility: CLINIC | Age: 38
End: 2018-10-19
Payer: COMMERCIAL

## 2018-10-19 VITALS
SYSTOLIC BLOOD PRESSURE: 140 MMHG | DIASTOLIC BLOOD PRESSURE: 90 MMHG | BODY MASS INDEX: 25.18 KG/M2 | WEIGHT: 170 LBS | RESPIRATION RATE: 20 BRPM | HEART RATE: 84 BPM | OXYGEN SATURATION: 99 % | TEMPERATURE: 98 F | HEIGHT: 69 IN

## 2018-10-19 DIAGNOSIS — S63.633A SPRAIN OF INTERPHALANGEAL JOINT OF LEFT MIDDLE FINGER, INITIAL ENCOUNTER: Primary | ICD-10-CM

## 2018-10-19 PROCEDURE — 3008F BODY MASS INDEX DOCD: CPT | Mod: CPTII,S$GLB,, | Performed by: PHYSICIAN ASSISTANT

## 2018-10-19 PROCEDURE — 73140 X-RAY EXAM OF FINGER(S): CPT | Mod: LT,S$GLB,, | Performed by: RADIOLOGY

## 2018-10-19 PROCEDURE — 29130 APPL FINGER SPLINT STATIC: CPT | Mod: LT,S$GLB,, | Performed by: PHYSICIAN ASSISTANT

## 2018-10-19 PROCEDURE — 99214 OFFICE O/P EST MOD 30 MIN: CPT | Mod: 25,S$GLB,, | Performed by: PHYSICIAN ASSISTANT

## 2018-10-19 NOTE — PATIENT INSTRUCTIONS
- Rest.    - Drink plenty of fluids.    - Tylenol or Ibuprofen as directed as needed for fever/pain.    - Ice for the next 24-48 hours.    - Elevate when possible.   - wear splint for comfort.  Do range of motion of finger as directed.  - Follow up with your PCP or specialty clinic as directed in the next 1-2 weeks if not improved or as needed.  You can call [phone] to schedule an appointment with the appropriate provider.    - Go to the ED if your symptoms worsen.  - You must understand that you have received an Urgent Care treatment only and that you may be released before all of your medical problems are known or treated.   - You, the patient, will arrange for follow up care as instructed.   - If your condition worsens or fails to improve we recommend that you receive another evaluation at the ER immediately or contact your PCP to discuss your concerns or return here.       Finger Sprain  A sprain is a stretching or tearing of the ligaments that hold a joint together. There are no broken bones. Sprains take 3 to 6 weeks to heal.  A sprained finger may be treated with a splint or buddy tape. This is when you tape the injured finger to the one next to it for support. Minor sprains may require no additional support.  Home care  · Keep your hand elevated to reduce pain and swelling. This is very important during the first 48 hours.  · Apply an ice pack over the injured area for 15 to 20 minutes every 3 to 6 hours. You should do this for the first 24 to 48 hours. You can make an ice pack by filling a plastic bag that seals at the top with ice cubes and then wrapping it with a thin towel. Continue the use of ice packs for relief of pain and swelling as needed. As the ice melts, be careful to avoid getting any wrap or splint wet. After 48 hours, apply heat (warm shower or warm bath) for 15 to 20 minutes several times a day, or alternate ice and heat.  · If buddy tape was applied and it becomes wet or dirty,  change it. You may replace it with paper, plastic or cloth tape. Cloth tape and paper tapes must be kept dry. Apply gauze or cotton padding between the fingers, especially at the webbed space. This will help prevent the skin from getting moist and breaking down. Keep the jennifer tape in place for at least 4 weeks, or as instructed by your healthcare provider.  · If a splint was applied, wear it for the time advised.  · You may use over-the-counter pain medicine to control pain, unless another pain medicine was prescribed. If you have chronic liver or kidney disease or ever had a stomach ulcer or GI bleeding, talk with your healthcare provider before using these medicines.  Follow-up care  Follow up with your healthcare provider as directed. Finger joints will become stiff if immobile for too long. If a splint was applied, ask your healthcare provider when it is safe to begin range-of-motion exercises.  Sometimes fractures dont show up on the first X-ray. Bruises and sprains can sometimes hurt as much as a fracture. These injuries can take time to heal completely. If your symptoms dont improve or they get worse, talk with your healthcare provider. You may need a repeat X-ray. If X-rays were taken, you will be told of any new findings that may affect your care.  When to seek medical advice  Call your healthcare provider right away if any of these occur:  · Pain or swelling increases  · Fingers or hand becomes cold, blue, numb, or tingly  Date Last Reviewed: 11/20/2015  © 0523-8072 [employer]. [address] [place] [address], [place] [address]. All rights reserved. This information is not intended as a substitute for professional medical care. Always follow your healthcare professional's instructions.

## 2018-10-19 NOTE — PROGRESS NOTES
"Subjective:       Patient ID: Juan Manuel Hdz is a 37 y.o. male.    Vitals:  height is 5' 9" (1.753 m) and weight is 77.1 kg (170 lb). His oral temperature is 98.3 °F (36.8 °C). His blood pressure is 140/90 (abnormal) and his pulse is 84. His respiration is 20 and oxygen saturation is 99%.     Chief Complaint: Hand Pain    This is a 37 y.o. male   with Past Medical History:  No date: ADHD (attention deficit hyperactivity disorder)  No date: Allergy      Comment:  seasonal  No date: AR (allergic rhinitis)  No date: History of psychiatric care  No date: Psychiatric problem  No date: Reactive airway disease  No date: Therapy   and Past Surgical History:  No date: APPENDECTOMY  11/21/2017: APPENDECTOMY-LAPAROSCOPIC; N/A      Comment:  Performed by Stacy Covington MD at [facility] OR [address] [place] [address]  2010: BACK SURGERY  No date: CLOSED REDUCTION ZYGOMATIC ARCH FRACTURE  6/30/2015: REPAIR-ACHILLES TENDON; Right      Comment:  Performed by Saleem Roberts MD at [facility] OR [address] [place] [address]  who presents today with a chief complaint of left middle finger injury. He hurt his finger while playing soccer a day ago.       Hand Pain    The incident occurred 12 to 24 hours ago. [place] The injury mechanism was a fall and a direct blow. The pain is present in the left fingers. Quality: throbbing. The pain does not radiate. The pain is at a severity of 3/10. The pain is mild. The pain has been fluctuating since the incident. Pertinent negatives include no chest pain or numbness. The symptoms are aggravated by movement. He has tried ice for the symptoms. The treatment provided mild relief.     Review of Systems   Constitution: Negative for weakness and malaise/fatigue.   HENT: Negative for nosebleeds.    Cardiovascular: Negative for chest pain and syncope.   Respiratory: Negative for shortness of breath.    Musculoskeletal: Positive for joint pain and stiffness. Negative for back pain and neck pain.   Gastrointestinal: " Negative for abdominal pain.   Genitourinary: Negative for hematuria.   Neurological: Negative for dizziness and numbness.       Objective:      Physical Exam   Constitutional: He is oriented to person, place, and time. He appears well-developed and well-nourished. No distress.   HENT:   Head: Normocephalic and atraumatic.   Eyes: Conjunctivae are normal.   Neck: Normal range of motion. Neck supple.   Cardiovascular: Normal rate and regular rhythm. Exam reveals no gallop and no friction rub.   No murmur heard.  Pulmonary/Chest: Effort normal and breath sounds normal. He has no wheezes. He has no rales.   Musculoskeletal:        Left hand: He exhibits decreased range of motion (3rd finger), tenderness (DIP joint, 3rd finger) and swelling (distal 3rd finger).   Neurological: He is alert and oriented to person, place, and time.   Skin: Skin is warm and dry. No rash noted. No erythema.   Psychiatric: He has a normal mood and affect. His behavior is normal. Judgment and thought content normal.   Nursing note and vitals reviewed.      5:48 PM - x-ray of the left 3rd finger shows no acute fracture.  He was placed in a static finger splint for comfort.  Advised orthopedic follow-up if difficulty with range of motion 1 week from now.    Xr Finger 2 Or More Views    Result Date: 10/19/2018  EXAMINATION: XR FINGER 2 OR MORE VIEWS CLINICAL HISTORY: Pain, unspecified COMPARISON: None FINDINGS: Three views left 3rd digit. No acute displaced fracture or dislocation of the visualized digits.  No radiopaque foreign body.     1. No acute displaced fracture or dislocation of the 3rd digit noting possible mild edema about the distal aspect. Electronically signed by: Ki Paniagua MD Date:    10/19/2018 Time:    17:47    Assessment:       1. Sprain of interphalangeal joint of left middle finger, initial encounter        Plan:         Sprain of interphalangeal joint of left middle finger, initial encounter  -     XR FINGER 2 OR MORE  VIEWS; Future; Expected date: 10/19/2018  -     Ambulatory referral to Orthopedics        Juan Manuel was seen today for hand pain.    Diagnoses and all orders for this visit:    Sprain of interphalangeal joint of left middle finger, initial encounter  -     XR FINGER 2 OR MORE VIEWS; Future  -     Ambulatory referral to Orthopedics      Patient Instructions   - Rest.    - Drink plenty of fluids.    - Tylenol or Ibuprofen as directed as needed for fever/pain.    - Ice for the next 24-48 hours.    - Elevate when possible.   - wear splint for comfort.  Do range of motion of finger as directed.  - Follow up with your PCP or specialty clinic as directed in the next 1-2 weeks if not improved or as needed.  You can call [phone] to schedule an appointment with the appropriate provider.    - Go to the ED if your symptoms worsen.  - You must understand that you have received an Urgent Care treatment only and that you may be released before all of your medical problems are known or treated.   - You, the patient, will arrange for follow up care as instructed.   - If your condition worsens or fails to improve we recommend that you receive another evaluation at the ER immediately or contact your PCP to discuss your concerns or return here.       Finger Sprain  A sprain is a stretching or tearing of the ligaments that hold a joint together. There are no broken bones. Sprains take 3 to 6 weeks to heal.  A sprained finger may be treated with a splint or buddy tape. This is when you tape the injured finger to the one next to it for support. Minor sprains may require no additional support.  Home care  · Keep your hand elevated to reduce pain and swelling. This is very important during the first 48 hours.  · Apply an ice pack over the injured area for 15 to 20 minutes every 3 to 6 hours. You should do this for the first 24 to 48 hours. You can make an ice pack by filling a plastic bag that seals at the top with ice cubes and then  wrapping it with a thin towel. Continue the use of ice packs for relief of pain and swelling as needed. As the ice melts, be careful to avoid getting any wrap or splint wet. After 48 hours, apply heat (warm shower or warm bath) for 15 to 20 minutes several times a day, or alternate ice and heat.  · If buddy tape was applied and it becomes wet or dirty, change it. You may replace it with paper, plastic or cloth tape. Cloth tape and paper tapes must be kept dry. Apply gauze or cotton padding between the fingers, especially at the webbed space. This will help prevent the skin from getting moist and breaking down. Keep the buddy tape in place for at least 4 weeks, or as instructed by your healthcare provider.  · If a splint was applied, wear it for the time advised.  · You may use over-the-counter pain medicine to control pain, unless another pain medicine was prescribed. If you have chronic liver or kidney disease or ever had a stomach ulcer or GI bleeding, talk with your healthcare provider before using these medicines.  Follow-up care  Follow up with your healthcare provider as directed. Finger joints will become stiff if immobile for too long. If a splint was applied, ask your healthcare provider when it is safe to begin range-of-motion exercises.  Sometimes fractures dont show up on the first X-ray. Bruises and sprains can sometimes hurt as much as a fracture. These injuries can take time to heal completely. If your symptoms dont improve or they get worse, talk with your healthcare provider. You may need a repeat X-ray. If X-rays were taken, you will be told of any new findings that may affect your care.  When to seek medical advice  Call your healthcare provider right away if any of these occur:  · Pain or swelling increases  · Fingers or hand becomes cold, blue, numb, or tingly  Date Last Reviewed: 11/20/2015  © 3045-6187 [employer]. [address] [place] [address], [place] [address]. All rights reserved.  This information is not intended as a substitute for professional medical care. Always follow your healthcare professional's instructions.

## 2018-12-16 ENCOUNTER — OFFICE VISIT (OUTPATIENT)
Dept: URGENT CARE | Facility: CLINIC | Age: 38
End: 2018-12-16
Payer: COMMERCIAL

## 2018-12-16 VITALS
HEART RATE: 89 BPM | DIASTOLIC BLOOD PRESSURE: 76 MMHG | TEMPERATURE: 99 F | SYSTOLIC BLOOD PRESSURE: 118 MMHG | OXYGEN SATURATION: 100 % | WEIGHT: 175 LBS | RESPIRATION RATE: 20 BRPM | HEIGHT: 69 IN | BODY MASS INDEX: 25.92 KG/M2

## 2018-12-16 DIAGNOSIS — B34.9 VIRAL SYNDROME: Primary | ICD-10-CM

## 2018-12-16 DIAGNOSIS — J06.9 UPPER RESPIRATORY TRACT INFECTION, UNSPECIFIED TYPE: ICD-10-CM

## 2018-12-16 LAB
CTP QC/QA: YES
FLUAV AG NPH QL: NEGATIVE
FLUBV AG NPH QL: NEGATIVE

## 2018-12-16 PROCEDURE — 96372 THER/PROPH/DIAG INJ SC/IM: CPT | Mod: S$GLB,,, | Performed by: FAMILY MEDICINE

## 2018-12-16 PROCEDURE — 87804 INFLUENZA ASSAY W/OPTIC: CPT | Mod: 59,QW,S$GLB, | Performed by: PHYSICIAN ASSISTANT

## 2018-12-16 PROCEDURE — 99214 OFFICE O/P EST MOD 30 MIN: CPT | Mod: 25,S$GLB,, | Performed by: PHYSICIAN ASSISTANT

## 2018-12-16 PROCEDURE — 3008F BODY MASS INDEX DOCD: CPT | Mod: CPTII,S$GLB,, | Performed by: PHYSICIAN ASSISTANT

## 2018-12-16 RX ORDER — DEXAMETHASONE SODIUM PHOSPHATE 100 MG/10ML
8 INJECTION INTRAMUSCULAR; INTRAVENOUS
Status: COMPLETED | OUTPATIENT
Start: 2018-12-16 | End: 2018-12-16

## 2018-12-16 RX ADMIN — DEXAMETHASONE SODIUM PHOSPHATE 8 MG: 100 INJECTION INTRAMUSCULAR; INTRAVENOUS at 03:12

## 2018-12-16 NOTE — PROGRESS NOTES
"Subjective:       Patient ID: Juan Manuel Hzd is a 38 y.o. male.    Vitals:  height is 5' 9" (1.753 m) and weight is 79.4 kg (175 lb). His oral temperature is 98.5 °F (36.9 °C). His blood pressure is 118/76 and his pulse is 89. His respiration is 20 and oxygen saturation is 100%.     Chief Complaint: URI    This is a 38 y.o. male who presents today with a chief complaint of URI since yesterday.  Patient states he has sinus pain/pressure, nasal congestion, HA,  post nasal drip, and chills.  He has not checked his temperature.  He has not taken anything for this.        URI    This is a new problem. The current episode started yesterday. The problem has been gradually worsening. There has been no fever. Associated symptoms include congestion and sinus pain. Pertinent negatives include no coughing, ear pain, nausea, rash, sore throat, vomiting or wheezing. He has tried nothing for the symptoms.       Constitution: Positive for chills. Negative for sweating, fatigue and fever.   HENT: Positive for congestion, sinus pain and sinus pressure. Negative for ear pain, sore throat and voice change.    Neck: Negative for painful lymph nodes.   Eyes: Negative for eye redness.   Respiratory: Negative for chest tightness, cough, sputum production, bloody sputum, COPD, shortness of breath, stridor, wheezing and asthma.    Gastrointestinal: Negative for nausea and vomiting.   Musculoskeletal: Positive for muscle ache.   Skin: Negative for rash and erythema.   Allergic/Immunologic: Negative for seasonal allergies and asthma.   Hematologic/Lymphatic: Negative for swollen lymph nodes.       Objective:      Physical Exam   Constitutional: He is oriented to person, place, and time. He appears well-developed and well-nourished. No distress.   HENT:   Head: Normocephalic and atraumatic.   Right Ear: Hearing, tympanic membrane, external ear and ear canal normal.   Left Ear: Hearing, tympanic membrane, external ear and ear canal " normal.   Nose: Mucosal edema and rhinorrhea present.   Mouth/Throat: Uvula is midline and oropharynx is clear and moist.   Eyes: Conjunctivae are normal.   Neck: Normal range of motion. Neck supple.   Cardiovascular: Normal rate and regular rhythm. Exam reveals no gallop and no friction rub.   No murmur heard.  Pulmonary/Chest: Effort normal and breath sounds normal. He has no wheezes. He has no rales.   Musculoskeletal: Normal range of motion.   Neurological: He is alert and oriented to person, place, and time.   Skin: Skin is warm and dry. No rash noted. No erythema.   Psychiatric: He has a normal mood and affect. His behavior is normal. Judgment and thought content normal.   Nursing note and vitals reviewed.      Results for orders placed or performed in visit on 12/16/18   POCT Influenza A/B   Result Value Ref Range    Rapid Influenza A Ag Negative Negative    Rapid Influenza B Ag Negative Negative     Acceptable Yes        Assessment:       1. Viral syndrome    2. Upper respiratory tract infection, unspecified type        Plan:         Viral syndrome  -     POCT Influenza A/B    Upper respiratory tract infection, unspecified type  -     dexamethasone injection 8 mg        Juan Manuel was seen today for uri.    Diagnoses and all orders for this visit:    Viral syndrome  -     POCT Influenza A/B    Upper respiratory tract infection, unspecified type  -     dexamethasone injection 8 mg      Patient Instructions   - Rest.    - Drink plenty of fluids.    - Tylenol or Ibuprofen as directed as needed for fever/pain.    - Follow up with your PCP or specialty clinic as directed in the next 1-2 weeks if not improved or as needed.  You can call [phone] to schedule an appointment with the appropriate provider.    - Go to the ED if your symptoms worsen.  - You must understand that you have received an Urgent Care treatment only and that you may be released before all of your medical problems are known or  "treated.   - You, the patient, will arrange for follow up care as instructed.   - If your condition worsens or fails to improve we recommend that you receive another evaluation at the ER immediately or contact your PCP to discuss your concerns or return here.       Viral Syndrome (Adult)  A viral illness may cause a number of symptoms. The symptoms depend on the part of the body that the virus affects. If it settles in your nose, throat, and lungs, it may cause cough, sore throat, congestion, and sometimes headache. If it settles in your stomach and intestinal tract, it may cause vomiting and diarrhea. Sometimes it causes vague symptoms like "aching all over," feeling tired, loss of appetite, or fever.  A viral illness usually lasts 1 to 2 weeks, but sometimes it lasts longer. In some cases, a more serious infection can look like a viral syndrome in the first few days of the illness. You may need another exam and additional tests to know the difference. Watch for the warning signs listed below.  Home care  Follow these guidelines for taking care of yourself at home:  · If symptoms are severe, rest at home for the first 2 to 3 days.  · Stay away from cigarette smoke - both your smoke and the smoke from others.  · You may use over-the-counter acetaminophen or ibuprofen for fever, muscle aching, and headache, unless another medicine was prescribed for this. If you have chronic liver or kidney disease or ever had a stomach ulcer or GI bleeding, talk with your doctor before using these medicines. No one who is younger than 18 and ill with a fever should take aspirin. It may cause severe disease or death.  · Your appetite may be poor, so a light diet is fine. Avoid dehydration by drinking 8 to 12 8-ounce glasses of fluids each day. This may include water; orange juice; lemonade; apple, grape, and cranberry juice; clear fruit drinks; electrolyte replacement and sports drinks; and decaffeinated teas and coffee. If you have " been diagnosed with a kidney disease, ask your doctor how much and what types of fluids you should drink to prevent dehydration. If you have kidney disease, drinking too much fluid can cause it build up in the your body and be dangerous to your health.  · Over-the-counter remedies won't shorten the length of the illness but may be helpful for cough, sore throat; and nasal and sinus congestion. Don't use decongestants if you have high blood pressure.  Follow-up care  Follow up with your healthcare provider if you do not improve over the next week.  Call 911  Get emergency medical care if any of the following occur:  · Convulsion  · Feeling weak, dizzy, or like you are going to faint  · Chest pain, shortness of breath, wheezing, or difficulty breathing  When to seek medical advice  Call your healthcare provider right away if any of these occur:  · Cough with lots of colored sputum (mucus) or blood in your sputum  · Chest pain, shortness of breath, wheezing, or difficulty breathing  · Severe headache; face, neck, or ear pain  · Severe, constant pain in the lower right side of your belly (abdominal)  · Continued vomiting (cant keep liquids down)  · Frequent diarrhea (more than 5 times a day); blood (red or black color) or mucus in diarrhea  · Feeling weak, dizzy, or like you are going to faint  · Extreme thirst  · Fever of 100.4°F (38°C) or higher, or as directed by your healthcare provider  Date Last Reviewed: 9/25/2015  © 5662-7574 [employer]. [address] [place] [address]. All rights reserved. This information is not intended as a substitute for professional medical care. Always follow your healthcare professional's instructions.        Viral Upper Respiratory Illness (Adult)  You have a viral upper respiratory illness (URI), which is another term for the common cold. This illness is contagious during the first few days. It is spread through the air by coughing and sneezing. It may also be  spread by direct contact (touching the sick person and then touching your own eyes, nose, or mouth). Frequent handwashing will decrease risk of spread. Most viral illnesses go away within 7 to 10 days with rest and simple home remedies. Sometimes the illness may last for several weeks. Antibiotics will not kill a virus, and they are generally not prescribed for this condition.    Home care  · If symptoms are severe, rest at home for the first 2 to 3 days. When you resume activity, don't let yourself get too tired.  · Avoid being exposed to cigarette smoke (yours or others).  · You may use acetaminophen or ibuprofen to control pain and fever, unless another medicine was prescribed. (Note: If you have chronic liver or kidney disease, have ever had a stomach ulcer or gastrointestinal bleeding, or are taking blood-thinning medicines, talk with your healthcare provider before using these medicines.) Aspirin should never be given to anyone under 18 years of age who is ill with a viral infection or fever. It may cause severe liver or brain damage.  · Your appetite may be poor, so a light diet is fine. Avoid dehydration by drinking 6 to 8 glasses of fluids per day (water, soft drinks, juices, tea, or soup). Extra fluids will help loosen secretions in the nose and lungs.  · Over-the-counter cold medicines will not shorten the length of time youre sick, but they may be helpful for the following symptoms: cough, sore throat, and nasal and sinus congestion. (Note: Do not use decongestants if you have high blood pressure.)  Follow-up care  Follow up with your healthcare provider, or as advised.  When to seek medical advice  Call your healthcare provider right away if any of these occur:  · Cough with lots of colored sputum (mucus)  · Severe headache; face, neck, or ear pain  · Difficulty swallowing due to throat pain  · Fever of 100.4°F (38°C)  Call 911, or get immediate medical care  Call emergency services right away if any  of these occur:  · Chest pain, shortness of breath, wheezing, or difficulty breathing  · Coughing up blood  · Inability to swallow due to throat pain  Date Last Reviewed: 9/13/2015  © 5687-1755 [employer]. [address] [place] [address], [place] [address]. All rights reserved. This information is not intended as a substitute for professional medical care. Always follow your healthcare professional's instructions.

## 2018-12-16 NOTE — PATIENT INSTRUCTIONS
"- Rest.    - Drink plenty of fluids.    - Tylenol or Ibuprofen as directed as needed for fever/pain.    - Follow up with your PCP or specialty clinic as directed in the next 1-2 weeks if not improved or as needed.  You can call [phone] to schedule an appointment with the appropriate provider.    - Go to the ED if your symptoms worsen.  - You must understand that you have received an Urgent Care treatment only and that you may be released before all of your medical problems are known or treated.   - You, the patient, will arrange for follow up care as instructed.   - If your condition worsens or fails to improve we recommend that you receive another evaluation at the ER immediately or contact your PCP to discuss your concerns or return here.       Viral Syndrome (Adult)  A viral illness may cause a number of symptoms. The symptoms depend on the part of the body that the virus affects. If it settles in your nose, throat, and lungs, it may cause cough, sore throat, congestion, and sometimes headache. If it settles in your stomach and intestinal tract, it may cause vomiting and diarrhea. Sometimes it causes vague symptoms like "aching all over," feeling tired, loss of appetite, or fever.  A viral illness usually lasts 1 to 2 weeks, but sometimes it lasts longer. In some cases, a more serious infection can look like a viral syndrome in the first few days of the illness. You may need another exam and additional tests to know the difference. Watch for the warning signs listed below.  Home care  Follow these guidelines for taking care of yourself at home:  · If symptoms are severe, rest at home for the first 2 to 3 days.  · Stay away from cigarette smoke - both your smoke and the smoke from others.  · You may use over-the-counter acetaminophen or ibuprofen for fever, muscle aching, and headache, unless another medicine was prescribed for this. If you have chronic liver or kidney disease or ever had a stomach ulcer or " GI bleeding, talk with your doctor before using these medicines. No one who is younger than 18 and ill with a fever should take aspirin. It may cause severe disease or death.  · Your appetite may be poor, so a light diet is fine. Avoid dehydration by drinking 8 to 12 8-ounce glasses of fluids each day. This may include water; orange juice; lemonade; apple, grape, and cranberry juice; clear fruit drinks; electrolyte replacement and sports drinks; and decaffeinated teas and coffee. If you have been diagnosed with a kidney disease, ask your doctor how much and what types of fluids you should drink to prevent dehydration. If you have kidney disease, drinking too much fluid can cause it build up in the your body and be dangerous to your health.  · Over-the-counter remedies won't shorten the length of the illness but may be helpful for cough, sore throat; and nasal and sinus congestion. Don't use decongestants if you have high blood pressure.  Follow-up care  Follow up with your healthcare provider if you do not improve over the next week.  Call 911  Get emergency medical care if any of the following occur:  · Convulsion  · Feeling weak, dizzy, or like you are going to faint  · Chest pain, shortness of breath, wheezing, or difficulty breathing  When to seek medical advice  Call your healthcare provider right away if any of these occur:  · Cough with lots of colored sputum (mucus) or blood in your sputum  · Chest pain, shortness of breath, wheezing, or difficulty breathing  · Severe headache; face, neck, or ear pain  · Severe, constant pain in the lower right side of your belly (abdominal)  · Continued vomiting (cant keep liquids down)  · Frequent diarrhea (more than 5 times a day); blood (red or black color) or mucus in diarrhea  · Feeling weak, dizzy, or like you are going to faint  · Extreme thirst  · Fever of 100.4°F (38°C) or higher, or as directed by your healthcare provider  Date Last Reviewed: 9/25/2015  ©  6314-3756 [employer]. [address] [place] [address]. All rights reserved. This information is not intended as a substitute for professional medical care. Always follow your healthcare professional's instructions.        Viral Upper Respiratory Illness (Adult)  You have a viral upper respiratory illness (URI), which is another term for the common cold. This illness is contagious during the first few days. It is spread through the air by coughing and sneezing. It may also be spread by direct contact (touching the sick person and then touching your own eyes, nose, or mouth). Frequent handwashing will decrease risk of spread. Most viral illnesses go away within 7 to 10 days with rest and simple home remedies. Sometimes the illness may last for several weeks. Antibiotics will not kill a virus, and they are generally not prescribed for this condition.    Home care  · If symptoms are severe, rest at home for the first 2 to 3 days. When you resume activity, don't let yourself get too tired.  · Avoid being exposed to cigarette smoke (yours or others).  · You may use acetaminophen or ibuprofen to control pain and fever, unless another medicine was prescribed. (Note: If you have chronic liver or kidney disease, have ever had a stomach ulcer or gastrointestinal bleeding, or are taking blood-thinning medicines, talk with your healthcare provider before using these medicines.) Aspirin should never be given to anyone under 18 years of age who is ill with a viral infection or fever. It may cause severe liver or brain damage.  · Your appetite may be poor, so a light diet is fine. Avoid dehydration by drinking 6 to 8 glasses of fluids per day (water, soft drinks, juices, tea, or soup). Extra fluids will help loosen secretions in the nose and lungs.  · Over-the-counter cold medicines will not shorten the length of time youre sick, but they may be helpful for the following symptoms: cough, sore throat, and  nasal and sinus congestion. (Note: Do not use decongestants if you have high blood pressure.)  Follow-up care  Follow up with your healthcare provider, or as advised.  When to seek medical advice  Call your healthcare provider right away if any of these occur:  · Cough with lots of colored sputum (mucus)  · Severe headache; face, neck, or ear pain  · Difficulty swallowing due to throat pain  · Fever of 100.4°F (38°C)  Call 911, or get immediate medical care  Call emergency services right away if any of these occur:  · Chest pain, shortness of breath, wheezing, or difficulty breathing  · Coughing up blood  · Inability to swallow due to throat pain  Date Last Reviewed: 9/13/2015  © 3580-7794 [employer]. [address] [place] [address], [place] [address]. All rights reserved. This information is not intended as a substitute for professional medical care. Always follow your healthcare professional's instructions.

## 2019-01-23 DIAGNOSIS — F90.0 ATTENTION DEFICIT HYPERACTIVITY DISORDER (ADHD), PREDOMINANTLY INATTENTIVE TYPE: ICD-10-CM

## 2019-01-23 RX ORDER — DEXTROAMPHETAMINE SACCHARATE, AMPHETAMINE ASPARTATE, DEXTROAMPHETAMINE SULFATE AND AMPHETAMINE SULFATE 5; 5; 5; 5 MG/1; MG/1; MG/1; MG/1
1 TABLET ORAL 2 TIMES DAILY
Qty: 60 TABLET | Refills: 0 | Status: SHIPPED | OUTPATIENT
Start: 2019-01-23 | End: 2019-03-21 | Stop reason: SDUPTHER

## 2019-02-11 ENCOUNTER — OFFICE VISIT (OUTPATIENT)
Dept: INTERNAL MEDICINE | Facility: CLINIC | Age: 39
End: 2019-02-11
Payer: COMMERCIAL

## 2019-02-11 ENCOUNTER — LAB VISIT (OUTPATIENT)
Dept: LAB | Facility: HOSPITAL | Age: 39
End: 2019-02-11
Payer: COMMERCIAL

## 2019-02-11 DIAGNOSIS — M79.606 PAIN OF LOWER EXTREMITY, UNSPECIFIED LATERALITY: ICD-10-CM

## 2019-02-11 DIAGNOSIS — G62.9 NEUROPATHY: Primary | ICD-10-CM

## 2019-02-11 DIAGNOSIS — I73.00 RAYNAUD'S DISEASE WITHOUT GANGRENE: ICD-10-CM

## 2019-02-11 DIAGNOSIS — B35.1 DERMATOPHYTOSIS OF NAIL: ICD-10-CM

## 2019-02-11 DIAGNOSIS — Z13.89 SCREENING FOR MULTIPLE CONDITIONS: ICD-10-CM

## 2019-02-11 DIAGNOSIS — G62.9 NEUROPATHY: ICD-10-CM

## 2019-02-11 LAB
ALBUMIN SERPL BCP-MCNC: 4.3 G/DL
ALBUMIN SERPL BCP-MCNC: 4.3 G/DL
ALP SERPL-CCNC: 37 U/L
ALP SERPL-CCNC: 37 U/L
ALT SERPL W/O P-5'-P-CCNC: 16 U/L
ALT SERPL W/O P-5'-P-CCNC: 16 U/L
ANION GAP SERPL CALC-SCNC: 4 MMOL/L
AST SERPL-CCNC: 19 U/L
AST SERPL-CCNC: 19 U/L
BASOPHILS # BLD AUTO: 0.01 K/UL
BASOPHILS NFR BLD: 0.2 %
BILIRUB DIRECT SERPL-MCNC: 0.5 MG/DL
BILIRUB SERPL-MCNC: 1.1 MG/DL
BILIRUB SERPL-MCNC: 1.1 MG/DL
BUN SERPL-MCNC: 12 MG/DL
CALCIUM SERPL-MCNC: 9.6 MG/DL
CHLORIDE SERPL-SCNC: 104 MMOL/L
CHOLEST SERPL-MCNC: 157 MG/DL
CHOLEST/HDLC SERPL: 2.8 {RATIO}
CO2 SERPL-SCNC: 31 MMOL/L
CREAT SERPL-MCNC: 1 MG/DL
DIFFERENTIAL METHOD: ABNORMAL
EOSINOPHIL # BLD AUTO: 0.3 K/UL
EOSINOPHIL NFR BLD: 7.9 %
ERYTHROCYTE [DISTWIDTH] IN BLOOD BY AUTOMATED COUNT: 12.3 %
EST. GFR  (AFRICAN AMERICAN): >60 ML/MIN/1.73 M^2
EST. GFR  (NON AFRICAN AMERICAN): >60 ML/MIN/1.73 M^2
FOLATE SERPL-MCNC: 8.5 NG/ML
GLUCOSE SERPL-MCNC: 94 MG/DL
HCT VFR BLD AUTO: 40.6 %
HDLC SERPL-MCNC: 56 MG/DL
HDLC SERPL: 35.7 %
HGB BLD-MCNC: 13.8 G/DL
LDLC SERPL CALC-MCNC: 93.2 MG/DL
LYMPHOCYTES # BLD AUTO: 1.1 K/UL
LYMPHOCYTES NFR BLD: 27 %
MCH RBC QN AUTO: 29.6 PG
MCHC RBC AUTO-ENTMCNC: 34 G/DL
MCV RBC AUTO: 87 FL
MONOCYTES # BLD AUTO: 0.5 K/UL
MONOCYTES NFR BLD: 11.1 %
NEUTROPHILS # BLD AUTO: 2.2 K/UL
NEUTROPHILS NFR BLD: 53.6 %
NONHDLC SERPL-MCNC: 101 MG/DL
PLATELET # BLD AUTO: 143 K/UL
PMV BLD AUTO: 9.3 FL
POTASSIUM SERPL-SCNC: 4.3 MMOL/L
PROT SERPL-MCNC: 6.8 G/DL
PROT SERPL-MCNC: 6.8 G/DL
RBC # BLD AUTO: 4.67 M/UL
SODIUM SERPL-SCNC: 139 MMOL/L
TRIGL SERPL-MCNC: 39 MG/DL
TSH SERPL DL<=0.005 MIU/L-ACNC: 1.35 UIU/ML
VIT B12 SERPL-MCNC: 475 PG/ML
WBC # BLD AUTO: 4.07 K/UL

## 2019-02-11 PROCEDURE — 3008F PR BODY MASS INDEX (BMI) DOCUMENTED: ICD-10-PCS | Mod: CPTII,S$GLB,, | Performed by: INTERNAL MEDICINE

## 2019-02-11 PROCEDURE — 82746 ASSAY OF FOLIC ACID SERUM: CPT

## 2019-02-11 PROCEDURE — 80076 HEPATIC FUNCTION PANEL: CPT

## 2019-02-11 PROCEDURE — 80061 LIPID PANEL: CPT

## 2019-02-11 PROCEDURE — 99999 PR PBB SHADOW E&M-EST. PATIENT-LVL IV: CPT | Mod: PBBFAC,,, | Performed by: INTERNAL MEDICINE

## 2019-02-11 PROCEDURE — 82607 VITAMIN B-12: CPT

## 2019-02-11 PROCEDURE — 84443 ASSAY THYROID STIM HORMONE: CPT

## 2019-02-11 PROCEDURE — 3008F BODY MASS INDEX DOCD: CPT | Mod: CPTII,S$GLB,, | Performed by: INTERNAL MEDICINE

## 2019-02-11 PROCEDURE — 99213 PR OFFICE/OUTPT VISIT, EST, LEVL III, 20-29 MIN: ICD-10-PCS | Mod: S$GLB,,, | Performed by: INTERNAL MEDICINE

## 2019-02-11 PROCEDURE — 85025 COMPLETE CBC W/AUTO DIFF WBC: CPT

## 2019-02-11 PROCEDURE — 80053 COMPREHEN METABOLIC PANEL: CPT

## 2019-02-11 PROCEDURE — 36415 COLL VENOUS BLD VENIPUNCTURE: CPT

## 2019-02-11 PROCEDURE — 99999 PR PBB SHADOW E&M-EST. PATIENT-LVL IV: ICD-10-PCS | Mod: PBBFAC,,, | Performed by: INTERNAL MEDICINE

## 2019-02-11 PROCEDURE — 99213 OFFICE O/P EST LOW 20 MIN: CPT | Mod: S$GLB,,, | Performed by: INTERNAL MEDICINE

## 2019-02-13 ENCOUNTER — OFFICE VISIT (OUTPATIENT)
Dept: URGENT CARE | Facility: CLINIC | Age: 39
End: 2019-02-13
Payer: COMMERCIAL

## 2019-02-13 ENCOUNTER — CLINICAL SUPPORT (OUTPATIENT)
Dept: CARDIOLOGY | Facility: CLINIC | Age: 39
End: 2019-02-13
Attending: INTERNAL MEDICINE
Payer: COMMERCIAL

## 2019-02-13 VITALS
TEMPERATURE: 97 F | HEIGHT: 69 IN | DIASTOLIC BLOOD PRESSURE: 72 MMHG | SYSTOLIC BLOOD PRESSURE: 122 MMHG | BODY MASS INDEX: 25.92 KG/M2 | RESPIRATION RATE: 20 BRPM | WEIGHT: 175 LBS | HEART RATE: 56 BPM | OXYGEN SATURATION: 100 %

## 2019-02-13 DIAGNOSIS — M79.606 PAIN OF LOWER EXTREMITY, UNSPECIFIED LATERALITY: ICD-10-CM

## 2019-02-13 DIAGNOSIS — S05.02XA ABRASION OF LEFT CORNEA, INITIAL ENCOUNTER: Primary | ICD-10-CM

## 2019-02-13 DIAGNOSIS — H20.9 IRITIS: ICD-10-CM

## 2019-02-13 LAB
LEFT ABI: 1.25
LEFT ARM BP: 117 MMHG
LEFT DORSALIS PEDIS: 148 MMHG
LEFT POSTERIOR TIBIAL: 139 MMHG
RIGHT ABI: 1.27
RIGHT ARM BP: 118 MMHG
RIGHT DORSALIS PEDIS: 146 MMHG
RIGHT POSTERIOR TIBIAL: 150 MMHG

## 2019-02-13 PROCEDURE — 93922 UPR/L XTREMITY ART 2 LEVELS: CPT | Mod: S$GLB,,, | Performed by: INTERNAL MEDICINE

## 2019-02-13 PROCEDURE — 99214 PR OFFICE/OUTPT VISIT, EST, LEVL IV, 30-39 MIN: ICD-10-PCS | Mod: S$GLB,,, | Performed by: PHYSICIAN ASSISTANT

## 2019-02-13 PROCEDURE — 93922 CV US ANKLE BRACHIAL INDICES RESTING (CUPID ONLY): ICD-10-PCS | Mod: S$GLB,,, | Performed by: INTERNAL MEDICINE

## 2019-02-13 PROCEDURE — 3008F BODY MASS INDEX DOCD: CPT | Mod: CPTII,S$GLB,, | Performed by: PHYSICIAN ASSISTANT

## 2019-02-13 PROCEDURE — 3008F PR BODY MASS INDEX (BMI) DOCUMENTED: ICD-10-PCS | Mod: CPTII,S$GLB,, | Performed by: PHYSICIAN ASSISTANT

## 2019-02-13 PROCEDURE — 99214 OFFICE O/P EST MOD 30 MIN: CPT | Mod: S$GLB,,, | Performed by: PHYSICIAN ASSISTANT

## 2019-02-13 RX ORDER — ERYTHROMYCIN 5 MG/G
OINTMENT OPHTHALMIC
Qty: 1 TUBE | Refills: 0 | Status: SHIPPED | OUTPATIENT
Start: 2019-02-13 | End: 2019-09-17

## 2019-02-13 NOTE — PROGRESS NOTES
RHEUMATOLOGY CLINIC INITIAL VISIT    Reason for referral:-  Referred by Dr. Ogden for evaluation of Raynaud's    Chief complaints:- Raynaud's      HPI:-  Juan Manuel Ivey a 38 y.o. pleasant male with history of asthma and ADHD comes in for an initial visit with above chief complaints.     Started ~ 1 year ago, patient noticed extremities color changes with the cold weather.   Started in the finger tips - triphasic color changes (white to purplish/blue).  Associated symptoms include burning/tingling sensation.  Symptoms alleviated with warmth.  Progressed to other areas including the ankle, elbows, knees, forearms.  Symptoms are apparently more frequently.  Increased stressors in life at this time (new baby)    Associated symptoms include fatigue, rash (face - been there for a while, itchy), sicca symptoms, SOB, palpitation, +chills, night sweat    Denies any abdominal pain, CP, N/V, fever, urinary/bowel symptoms, unintentional weight loss, photosensitivity.     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     Denies any EtOH, tobacco, or recreational medicine/supplements    Family autoimmune diseases - Mom SLE     Review of Systems   Constitutional: Positive for malaise/fatigue. Negative for chills, diaphoresis, fever and weight loss.   HENT: Negative for congestion, ear discharge, ear pain, hearing loss, nosebleeds, sinus pain and tinnitus.    Eyes: Negative for photophobia, pain, discharge and redness.   Respiratory: Negative for cough, hemoptysis, sputum production, shortness of breath, wheezing and stridor.    Cardiovascular: Negative for chest pain, palpitations, orthopnea, claudication, leg swelling and PND.   Gastrointestinal: Negative for abdominal pain, constipation, diarrhea, heartburn, nausea and vomiting.   Genitourinary: Negative for dysuria, frequency, hematuria and urgency.   Musculoskeletal: Negative for back pain, joint pain, myalgias and  neck pain.   Skin: Negative for rash.   Neurological: Positive for tingling. Negative for dizziness, tremors, weakness and headaches.   Endo/Heme/Allergies: Does not bruise/bleed easily.   Psychiatric/Behavioral: Negative for depression, hallucinations and suicidal ideas. The patient is not nervous/anxious and does not have insomnia.        Past Medical History:   Diagnosis Date    ADHD (attention deficit hyperactivity disorder)     Allergy     seasonal    AR (allergic rhinitis)     History of psychiatric care     Psychiatric problem     Reactive airway disease     Therapy        Past Surgical History:   Procedure Laterality Date    APPENDECTOMY      APPENDECTOMY-LAPAROSCOPIC N/A 11/21/2017    Performed by Stacy Covington MD at [facility] OR [address] [place] [address]    BACK SURGERY  2010    CLOSED REDUCTION ZYGOMATIC ARCH FRACTURE      REPAIR-ACHILLES TENDON Right 6/30/2015    Performed by Saleem Roberts MD at [facility] OR [address] [place] [address]        Social History     Tobacco Use    Smoking status: Never Smoker    Smokeless tobacco: Never Used   Substance Use Topics    Alcohol use: Yes     Alcohol/week: 0.5 oz     Types: 1 Standard drinks or equivalent per week     Comment: occasionally    Drug use: No       Family History   Problem Relation Age of Onset    Lupus Mother     Heart disease Father     Diabetes Maternal Grandmother     Diabetes Maternal Grandfather     ADD / ADHD Neg Hx     Alcohol abuse Neg Hx     Anxiety disorder Neg Hx     Bipolar disorder Neg Hx     Dementia Neg Hx     Depression Neg Hx     Drug abuse Neg Hx     OCD Neg Hx     Paranoid behavior Neg Hx     Physical abuse Neg Hx     Schizophrenia Neg Hx     Seizures Neg Hx     Self injury Neg Hx     Sexual abuse Neg Hx     Suicide Neg Hx     Melanoma Neg Hx        Review of patient's allergies indicates:  No Known Allergies        Physical examination:-    Vitals:    02/14/19 1115   BP: 119/80   Pulse: 76   Weight: 80.9 kg (178 lb 5.6 oz)   Height: 5'  "9" (1.753 m)   PainSc: 0-No pain       Physical Exam   Constitutional: He is oriented to person, place, and time and well-developed, well-nourished, and in no distress.   HENT:   Head: Normocephalic and atraumatic.   Normal salivary pooling   No bald spots    Eyes: EOM are normal. Pupils are equal, round, and reactive to light.   Neck: Normal range of motion. Neck supple.   Cardiovascular: Normal rate, regular rhythm and normal heart sounds.   Pulmonary/Chest: Effort normal and breath sounds normal.   Abdominal: Soft. Bowel sounds are normal.   Musculoskeletal: Normal range of motion. He exhibits tenderness. He exhibits no edema or deformity.   Tenderness of bilateral glenohumeral joint   No signs of synovitis      Neurological: He is alert and oriented to person, place, and time. Gait normal. GCS score is 15.   Skin: Skin is warm and dry. No rash noted. No erythema.   Red rash over face and nosebridge (no nasal-labia sparring)  +dandruff  Raynaud's of bilateral hands and feet - no ulcers   No nail pitting    Psychiatric: Mood, memory, affect and judgment normal.                           Labs:-  Results for YASIR AGUIRRE (MRN [id number]) as of 2/13/2019 15:31   Ref. Range 2/11/2019 09:43 2/11/2019 09:43 2/13/2019 15:18   WBC Latest Ref Range: 3.90 - 12.70 K/uL 4.07     RBC Latest Ref Range: 4.60 - 6.20 M/uL 4.67     Hemoglobin Latest Ref Range: 14.0 - 18.0 g/dL 13.8 (L)     Hematocrit Latest Ref Range: 40.0 - 54.0 % 40.6     MCV Latest Ref Range: 82 - 98 fL 87     MCH Latest Ref Range: 27.0 - 31.0 pg 29.6     MCHC Latest Ref Range: 32.0 - 36.0 g/dL 34.0     RDW Latest Ref Range: 11.5 - 14.5 % 12.3     Platelets Latest Ref Range: 150 - 350 K/uL 143 (L)     MPV Latest Ref Range: 9.2 - 12.9 fL 9.3     Gran% Latest Ref Range: 38.0 - 73.0 % 53.6     Gran # (ANC) Latest Ref Range: 1.8 - 7.7 K/uL 2.2     Lymph% Latest Ref Range: 18.0 - 48.0 % 27.0     Lymph # Latest Ref Range: 1.0 - 4.8 K/uL 1.1     Mono% Latest Ref " Range: 4.0 - 15.0 % 11.1     Mono # Latest Ref Range: 0.3 - 1.0 K/uL 0.5     Eosinophil% Latest Ref Range: 0.0 - 8.0 % 7.9     Eos # Latest Ref Range: 0.0 - 0.5 K/uL 0.3     Basophil% Latest Ref Range: 0.0 - 1.9 % 0.2     Baso # Latest Ref Range: 0.00 - 0.20 K/uL 0.01     Differential Method Unknown Automated     Folate Latest Ref Range: 4.0 - 24.0 ng/mL 8.5     Vitamin B-12 Latest Ref Range: 210 - 950 pg/mL 475     Sodium Latest Ref Range: 136 - 145 mmol/L  139    Potassium Latest Ref Range: 3.5 - 5.1 mmol/L  4.3    Chloride Latest Ref Range: 95 - 110 mmol/L  104    CO2 Latest Ref Range: 23 - 29 mmol/L  31 (H)    Anion Gap Latest Ref Range: 8 - 16 mmol/L  4 (L)    BUN, Bld Latest Ref Range: 6 - 20 mg/dL  12    Creatinine Latest Ref Range: 0.5 - 1.4 mg/dL  1.0    eGFR if non African American Latest Ref Range: >60 mL/min/1.73 m^2  >60    eGFR if  Latest Ref Range: >60 mL/min/1.73 m^2  >60    Glucose Latest Ref Range: 70 - 110 mg/dL  94    Calcium Latest Ref Range: 8.7 - 10.5 mg/dL  9.6    Alkaline Phosphatase Latest Ref Range: 55 - 135 U/L 37 (L) 37 (L)    Total Protein Latest Ref Range: 6.0 - 8.4 g/dL 6.8 6.8    Albumin Latest Ref Range: 3.5 - 5.2 g/dL 4.3 4.3    Total Bilirubin Latest Ref Range: 0.1 - 1.0 mg/dL 1.1 (H) 1.1 (H)    Bilirubin, Direct Latest Ref Range: 0.1 - 0.3 mg/dL 0.5 (H)     AST Latest Ref Range: 10 - 40 U/L 19 19    ALT Latest Ref Range: 10 - 44 U/L 16 16    Triglycerides Latest Ref Range: 30 - 150 mg/dL 39     Cholesterol Latest Ref Range: 120 - 199 mg/dL 157     HDL Latest Ref Range: 40 - 75 mg/dL 56     HDL/Chol Ratio Latest Ref Range: 20.0 - 50.0 % 35.7     LDL Cholesterol Latest Ref Range: 63.0 - 159.0 mg/dL 93.2     Non-HDL Cholesterol Latest Units: mg/dL 101     Total Cholesterol/HDL Ratio Latest Ref Range: 2.0 - 5.0  2.8     TSH Latest Ref Range: 0.400 - 4.000 uIU/mL 1.351       Results for YASIR AGUIRRE (MRN [id number]) as of 2/13/2019 15:31   Ref. Range 10/22/2009  10:48 10/23/2009 15:50   RK Latest Ref Range: Neg <1:160   Negative   Rheumatoid Factor Latest Ref Range: 0 - 15 IU/ml Negative            Radiographs:-  Independent visualization of images done.   X ray finger (L) - possible edema of L 3rd distal digit    Medication List with Changes/Refills   Current Medications    DEXTROAMPHETAMINE-AMPHETAMINE (ADDERALL) 20 MG TABLET    Take 1 tablet by mouth 2 (two) times daily.    ERYTHROMYCIN (ROMYCIN) OPHTHALMIC OINTMENT    Apply 1/2 inch ribbon to the inner lower eyelid and then gently massage onto the eye with the eye closed 3 times a day for 1 week.    IBUPROFEN (ADVIL,MOTRIN) 800 MG TABLET    Take 1 tablet (800 mg total) by mouth every 8 (eight) hours as needed for Pain (with food).    TERBINAFINE HCL (LAMISIL) 250 MG TABLET    Take 1 tablet (250 mg total) by mouth once daily. 1 pill by mouth.  Avoid alcohol intake while taking medication       Assessment/Plans:-  38 y.o. pleasant male with history of asthma and ADHD comes in for an initial visit for evaluation of Raynaud's x >1 year duration.      Started in the finger tips - triphasic color changes (white to purplish/blue) in the cold.  Associated symptoms include burning/tingling sensation.  Symptoms alleviated with warmth.  Progressed to other areas including the ankle, elbows, knees, forearms.  Symptoms are apparently more frequently.  Increased stressors in life at this time (new baby)    Associated symptoms include fatigue, rash (face - been there for a while, itchy), sicca symptoms, SOB, palpitation, +chills, night sweat     + family history of lupus (mother)    Labs - normal TSH.  +thrombocytopenia.  Normal RK (2009) and negative RF (2009).    Primary vs secondary Raynaud's.  Connective tissue/autoimmune disease needs to be ruled out at this time.     Thrombocytopenia - may warranted hem/onc eval    Plan  - CBC, CMP, ESR, CRP  - UA, Up/c  - C3/C4, RK, SSA, Scl70, cyroglobulins, IPEF, PreDMARDs,  RF/CCP, APL  -  Education provided on keeping core temperature warm and warming protective covering of the hands and feet  - Start Norvasc 2.5mg daily - education provided on potential side effects.  Discontinue medication immediately if + side effects  - Education provide on Raynaud's      # RTC in 6 weeks     Thank you Dr. Ogden for allowing me to participate in the care ofArjunmikhail Hdz.

## 2019-02-13 NOTE — PATIENT INSTRUCTIONS
- Rest.    - Drink plenty of fluids.    - Tylenol or Ibuprofen as directed as needed for fever/pain.    - Warm compresses to the affected area for 20 minutes at a time.   - Follow up with your PCP or specialty clinic as directed in the next 1-2 weeks if not improved or as needed.  You can call [phone] to schedule an appointment with the appropriate provider.    - Go to the ED if your symptoms worsen.  - You must understand that you have received an Urgent Care treatment only and that you may be released before all of your medical problems are known or treated.   - You, the patient, will arrange for follow up care as instructed.   - If your condition worsens or fails to improve we recommend that you receive another evaluation at the ER immediately or contact your PCP to discuss your concerns or return here.       Corneal Abrasion    You have received a scratch or scrape (abrasion) to your cornea. The cornea is the clear part in the front of the eye. This sensitive area is very painful when injured. You may make tears frequently, and your vision may be blurry until the injury heals. You may be sensitive to light.  This part of the body heals quickly. You can expect the pain to go away within 24 to 48 hours. If the abrasion is large or deep, your doctor may apply an eye patch, although this is not always done. An antibiotic ointment or eye drops may also be used to prevent infection.  Numbing drops may be used to relieve the pain temporarily so that your eyes can be examined. However, these drops cannot be prescribed for home use because that would prevent healing and lead to more serious problems. Also, if you cant feel your eye, there is a chance of accidentally injuring it further without knowing it.  Home care  · A cold pack (ice in a plastic bag, wrapped in a towel) may be applied over the eye (or eye patch) for 20 minutes at a time, to reduce pain.  · You may use acetaminophen or ibuprofen to control  pain, unless another pain medicine was prescribed. Note: If you have chronic liver or kidney disease or ever had a stomach ulcer or GI bleeding, talk with your doctor before using these medicines.  · Rest your eyes and do not read until symptoms are gone.  · If you use contact lenses, do not wear them until all symptoms are gone.  · If your vision is affected by the corneal abrasion or if an eye patch was applied, do not drive a motor vehicle or operate machinery until all symptoms are gone. You may have trouble judging distances using only one eye.  · If your eyes are sensitive to light, try wearing sunglasses, or stay indoors until symptoms go away.  Follow-up care  Follow up with your health care provider, or as advised.  · If no patch was put on your eye, and used but the pain continues for more than 48 hours, you should have another exam. Return to this facility or contact your health care provider to arrange this.  · If your eye was patched and you were asked to remove the patch yourself, see your health care provider. You may also return to this facility if you still have pain after the patch is removed.  · If you were given a return appointment for patch removal and re-examination, be sure to keep the appointment. Leaving the patch in place longer than advised could be harmful.  When to seek medical advice  Call your health care provider right away if any of these occur.  · Increasing eye pain or pain that does not improve after 24 hours  · Discharge from the eye  · Increasing redness of the eye or swelling of the eyelids  · Worsening vision  · Symptoms that worsen after the abrasion has healed  Date Last Reviewed: 6/14/2015  © 3948-2858 [employer]. [address] [place] [address], [place] [address]. All rights reserved. This information is not intended as a substitute for professional medical care. Always follow your healthcare professional's instructions.        Iritis    The iris is the colored part  of the eye that controls the size of the pupil. Iritis is an inflammation of the iris. It can be caused by injury to the eye or disease elsewhere in the body. Diseases linked to iritis include gout, Lyme disease, inflammatory bowel disease, or and rheumatoid arthritis. Often, no cause is found.  Iritis usually develops suddenly in one eye. Symptoms include redness, pain in the eye or brow region, sensitivity to light, and blurred vision.  Iritis is treated with eye drops to dilate the pupil and reduce pain. This will cause your vision to be blurred from a few hours up to a week, depending on the medicine used. Steroid drops may also be prescribed, to reduce pain from inflammation. Iritis from eye injury usually resolves in 1 to 2 weeks. Iritis from other causes may take several weeks to months to resolve.  Home care  · Use eye drops as prescribed.  · Wear sunglasses to decrease light sensitivity and discomfort.  · If your eye is dilated or if you have been given an eye patch, your driving ability will be affected. Do not drive until the blurred vision wears off and you no longer need an eye patch.  · You may use acetaminophen or ibuprofen to control pain, unless another medicine was prescribed. (Note: If you have chronic liver or kidney disease, or if you have ever had a stomach ulcer or gastrointestinal bleeding, talk with your doctor before using these medicines.)  Follow-up care  Follow up with your healthcare provider, or as advised.  When to seek medical advice  Contact your healthcare provider right away if any of these occur:  · Symptoms do not start to improve after 1 week.  · Iritis from eye injury causes symptoms for more than 2 weeks.  · Pain increases.  · Your eye becomes red.  · You lose some or all of your vision.  Date Last Reviewed: 6/22/2015  © 8670-7098 [employer]. [address] [place] [address], [place] [address]. All rights reserved. This information is not intended as a substitute for  professional medical care. Always follow your healthcare professional's instructions.

## 2019-02-13 NOTE — PROGRESS NOTES
"Subjective:       Patient ID: Juan Manuel Hdz is a 38 y.o. male.    Vitals:  height is 5' 9" (1.753 m) and weight is 79.4 kg (175 lb). His oral temperature is 97.2 °F (36.2 °C). His blood pressure is 122/72 and his pulse is 56 (abnormal). His respiration is 20 and oxygen saturation is 100%.     Chief Complaint: Eye Problem (Left Eye)    This is a 38 y.o. male who presents today with a chief complaint of left eye pain and redness since yesterday.  Pt states the eye is getting worse.  He states he feels like there is a needle in the eye.  He advised bright lights hurt the eye.  Pain level 8/10      Eye Problem    The left eye is affected. This is a new problem. The current episode started yesterday. The problem occurs constantly. The problem has been gradually worsening. There was no injury mechanism. The pain is at a severity of 8/10. The pain is severe. There is no known exposure to pink eye. He does not wear contacts. Associated symptoms include an eye discharge, eye redness and photophobia. Pertinent negatives include no blurred vision, double vision, fever, itching, nausea or vomiting. He has tried nothing for the symptoms.       Constitution: Negative for chills and fever.   HENT: Negative for congestion and sinus pain.    Cardiovascular: Negative for chest pain.   Eyes: Positive for eye discharge, eye pain, eye redness and photophobia. Negative for eye trauma, foreign body in eye, eye itching, vision loss, double vision, blurred vision and eyelid swelling.   Respiratory: Negative for shortness of breath.    Gastrointestinal: Negative for nausea and vomiting.   Skin: Negative for rash and erythema.   Allergic/Immunologic: Negative for seasonal allergies and itching.   Neurological: Negative for headaches.       Objective:      Physical Exam   Constitutional: He is oriented to person, place, and time. He appears well-developed and well-nourished. No distress.   HENT:   Head: Normocephalic and atraumatic. "   Eyes: EOM and lids are normal. Pupils are equal, round, and reactive to light. Left conjunctiva is injected.   Slit lamp exam:       The left eye shows corneal abrasion.       Patient has direct photophobia of the left eye and consensual photophobia of the left eye when light shined into the right eye.   Neck: Normal range of motion. Neck supple.   Cardiovascular: Normal rate and regular rhythm. Exam reveals no gallop and no friction rub.   No murmur heard.  Pulmonary/Chest: Effort normal and breath sounds normal. He has no wheezes. He has no rales.   Musculoskeletal: Normal range of motion.   Neurological: He is alert and oriented to person, place, and time.   Skin: Skin is warm and dry. No rash noted. No erythema.   Psychiatric: He has a normal mood and affect. His behavior is normal. Judgment and thought content normal.   Nursing note and vitals reviewed.      Assessment:       1. Abrasion of left cornea, initial encounter    2. Iritis        Plan:         Abrasion of left cornea, initial encounter  -     erythromycin (ROMYCIN) ophthalmic ointment; Apply 1/2 inch ribbon to the inner lower eyelid and then gently massage onto the eye with the eye closed 3 times a day for 1 week.  Dispense: 1 Tube; Refill: 0  -     Ambulatory referral to Ophthalmology    Iritis  -     Ambulatory referral to Ophthalmology        Juan Manuel was seen today for eye problem.    Diagnoses and all orders for this visit:    Abrasion of left cornea, initial encounter  -     erythromycin (ROMYCIN) ophthalmic ointment; Apply 1/2 inch ribbon to the inner lower eyelid and then gently massage onto the eye with the eye closed 3 times a day for 1 week.  -     Ambulatory referral to Ophthalmology    Iritis  -     Ambulatory referral to Ophthalmology      Patient Instructions   - Rest.    - Drink plenty of fluids.    - Tylenol or Ibuprofen as directed as needed for fever/pain.    - Warm compresses to the affected area for 20 minutes at a time.   -  Follow up with your PCP or specialty clinic as directed in the next 1-2 weeks if not improved or as needed.  You can call [phone] to schedule an appointment with the appropriate provider.    - Go to the ED if your symptoms worsen.  - You must understand that you have received an Urgent Care treatment only and that you may be released before all of your medical problems are known or treated.   - You, the patient, will arrange for follow up care as instructed.   - If your condition worsens or fails to improve we recommend that you receive another evaluation at the ER immediately or contact your PCP to discuss your concerns or return here.       Corneal Abrasion    You have received a scratch or scrape (abrasion) to your cornea. The cornea is the clear part in the front of the eye. This sensitive area is very painful when injured. You may make tears frequently, and your vision may be blurry until the injury heals. You may be sensitive to light.  This part of the body heals quickly. You can expect the pain to go away within 24 to 48 hours. If the abrasion is large or deep, your doctor may apply an eye patch, although this is not always done. An antibiotic ointment or eye drops may also be used to prevent infection.  Numbing drops may be used to relieve the pain temporarily so that your eyes can be examined. However, these drops cannot be prescribed for home use because that would prevent healing and lead to more serious problems. Also, if you cant feel your eye, there is a chance of accidentally injuring it further without knowing it.  Home care  · A cold pack (ice in a plastic bag, wrapped in a towel) may be applied over the eye (or eye patch) for 20 minutes at a time, to reduce pain.  · You may use acetaminophen or ibuprofen to control pain, unless another pain medicine was prescribed. Note: If you have chronic liver or kidney disease or ever had a stomach ulcer or GI bleeding, talk with your doctor before  using these medicines.  · Rest your eyes and do not read until symptoms are gone.  · If you use contact lenses, do not wear them until all symptoms are gone.  · If your vision is affected by the corneal abrasion or if an eye patch was applied, do not drive a motor vehicle or operate machinery until all symptoms are gone. You may have trouble judging distances using only one eye.  · If your eyes are sensitive to light, try wearing sunglasses, or stay indoors until symptoms go away.  Follow-up care  Follow up with your health care provider, or as advised.  · If no patch was put on your eye, and used but the pain continues for more than 48 hours, you should have another exam. Return to this facility or contact your health care provider to arrange this.  · If your eye was patched and you were asked to remove the patch yourself, see your health care provider. You may also return to this facility if you still have pain after the patch is removed.  · If you were given a return appointment for patch removal and re-examination, be sure to keep the appointment. Leaving the patch in place longer than advised could be harmful.  When to seek medical advice  Call your health care provider right away if any of these occur.  · Increasing eye pain or pain that does not improve after 24 hours  · Discharge from the eye  · Increasing redness of the eye or swelling of the eyelids  · Worsening vision  · Symptoms that worsen after the abrasion has healed  Date Last Reviewed: 6/14/2015  © 6543-5376 [employer]. [address] [place] [address]. All rights reserved. This information is not intended as a substitute for professional medical care. Always follow your healthcare professional's instructions.        Iritis    The iris is the colored part of the eye that controls the size of the pupil. Iritis is an inflammation of the iris. It can be caused by injury to the eye or disease elsewhere in the body. Diseases  linked to iritis include gout, Lyme disease, inflammatory bowel disease, or and rheumatoid arthritis. Often, no cause is found.  Iritis usually develops suddenly in one eye. Symptoms include redness, pain in the eye or brow region, sensitivity to light, and blurred vision.  Iritis is treated with eye drops to dilate the pupil and reduce pain. This will cause your vision to be blurred from a few hours up to a week, depending on the medicine used. Steroid drops may also be prescribed, to reduce pain from inflammation. Iritis from eye injury usually resolves in 1 to 2 weeks. Iritis from other causes may take several weeks to months to resolve.  Home care  · Use eye drops as prescribed.  · Wear sunglasses to decrease light sensitivity and discomfort.  · If your eye is dilated or if you have been given an eye patch, your driving ability will be affected. Do not drive until the blurred vision wears off and you no longer need an eye patch.  · You may use acetaminophen or ibuprofen to control pain, unless another medicine was prescribed. (Note: If you have chronic liver or kidney disease, or if you have ever had a stomach ulcer or gastrointestinal bleeding, talk with your doctor before using these medicines.)  Follow-up care  Follow up with your healthcare provider, or as advised.  When to seek medical advice  Contact your healthcare provider right away if any of these occur:  · Symptoms do not start to improve after 1 week.  · Iritis from eye injury causes symptoms for more than 2 weeks.  · Pain increases.  · Your eye becomes red.  · You lose some or all of your vision.  Date Last Reviewed: 6/22/2015  © 5045-0525 [employer]. [address] [place] [address], [place] [address]. All rights reserved. This information is not intended as a substitute for professional medical care. Always follow your healthcare professional's instructions.

## 2019-02-14 ENCOUNTER — LAB VISIT (OUTPATIENT)
Dept: LAB | Facility: HOSPITAL | Age: 39
End: 2019-02-14
Payer: COMMERCIAL

## 2019-02-14 ENCOUNTER — INITIAL CONSULT (OUTPATIENT)
Dept: RHEUMATOLOGY | Facility: CLINIC | Age: 39
End: 2019-02-14
Payer: COMMERCIAL

## 2019-02-14 VITALS
BODY MASS INDEX: 26.42 KG/M2 | HEART RATE: 76 BPM | SYSTOLIC BLOOD PRESSURE: 119 MMHG | WEIGHT: 178.38 LBS | DIASTOLIC BLOOD PRESSURE: 80 MMHG | HEIGHT: 69 IN

## 2019-02-14 DIAGNOSIS — I73.00 RAYNAUD'S DISEASE WITHOUT GANGRENE: ICD-10-CM

## 2019-02-14 LAB
ALBUMIN SERPL BCP-MCNC: 4.9 G/DL
ALP SERPL-CCNC: 42 U/L
ALT SERPL W/O P-5'-P-CCNC: 18 U/L
ANION GAP SERPL CALC-SCNC: 9 MMOL/L
AST SERPL-CCNC: 21 U/L
BASOPHILS # BLD AUTO: 0.03 K/UL
BASOPHILS NFR BLD: 0.7 %
BILIRUB SERPL-MCNC: 1.4 MG/DL
BUN SERPL-MCNC: 14 MG/DL
C3 SERPL-MCNC: 111 MG/DL
C4 SERPL-MCNC: 18 MG/DL
CALCIUM SERPL-MCNC: 10 MG/DL
CCP AB SER IA-ACNC: 0.5 U/ML
CHLORIDE SERPL-SCNC: 102 MMOL/L
CO2 SERPL-SCNC: 29 MMOL/L
CREAT SERPL-MCNC: 1 MG/DL
CRP SERPL-MCNC: 0.4 MG/L
DIFFERENTIAL METHOD: NORMAL
EOSINOPHIL # BLD AUTO: 0.3 K/UL
EOSINOPHIL NFR BLD: 5.6 %
ERYTHROCYTE [DISTWIDTH] IN BLOOD BY AUTOMATED COUNT: 12.1 %
ERYTHROCYTE [SEDIMENTATION RATE] IN BLOOD BY WESTERGREN METHOD: <2 MM/HR
EST. GFR  (AFRICAN AMERICAN): >60 ML/MIN/1.73 M^2
EST. GFR  (NON AFRICAN AMERICAN): >60 ML/MIN/1.73 M^2
GLUCOSE SERPL-MCNC: 88 MG/DL
HBV CORE AB SERPL QL IA: NEGATIVE
HBV SURFACE AB SER-ACNC: NEGATIVE M[IU]/ML
HBV SURFACE AG SERPL QL IA: NEGATIVE
HCT VFR BLD AUTO: 44.1 %
HCV AB SERPL QL IA: NEGATIVE
HEPATITIS A ANTIBODY, IGG: NEGATIVE
HGB BLD-MCNC: 15 G/DL
HIV 1+2 AB+HIV1 P24 AG SERPL QL IA: NEGATIVE
IMM GRANULOCYTES # BLD AUTO: 0.01 K/UL
IMM GRANULOCYTES NFR BLD AUTO: 0.2 %
LYMPHOCYTES # BLD AUTO: 1.1 K/UL
LYMPHOCYTES NFR BLD: 23.8 %
MCH RBC QN AUTO: 30.3 PG
MCHC RBC AUTO-ENTMCNC: 34 G/DL
MCV RBC AUTO: 89 FL
MONOCYTES # BLD AUTO: 0.3 K/UL
MONOCYTES NFR BLD: 7.6 %
NEUTROPHILS # BLD AUTO: 2.8 K/UL
NEUTROPHILS NFR BLD: 62.1 %
NRBC BLD-RTO: 0 /100 WBC
PLATELET # BLD AUTO: 150 K/UL
PMV BLD AUTO: 10 FL
POTASSIUM SERPL-SCNC: 3.7 MMOL/L
PROT SERPL-MCNC: 7.9 G/DL
RBC # BLD AUTO: 4.95 M/UL
RHEUMATOID FACT SERPL-ACNC: <10 IU/ML
SODIUM SERPL-SCNC: 140 MMOL/L
WBC # BLD AUTO: 4.5 K/UL

## 2019-02-14 PROCEDURE — 80053 COMPREHEN METABOLIC PANEL: CPT

## 2019-02-14 PROCEDURE — 86146 BETA-2 GLYCOPROTEIN ANTIBODY: CPT | Mod: 59

## 2019-02-14 PROCEDURE — 86160 COMPLEMENT ANTIGEN: CPT

## 2019-02-14 PROCEDURE — 86803 HEPATITIS C AB TEST: CPT

## 2019-02-14 PROCEDURE — 86704 HEP B CORE ANTIBODY TOTAL: CPT

## 2019-02-14 PROCEDURE — 3008F PR BODY MASS INDEX (BMI) DOCUMENTED: ICD-10-PCS | Mod: CPTII,S$GLB,, | Performed by: STUDENT IN AN ORGANIZED HEALTH CARE EDUCATION/TRAINING PROGRAM

## 2019-02-14 PROCEDURE — 3008F BODY MASS INDEX DOCD: CPT | Mod: CPTII,S$GLB,, | Performed by: STUDENT IN AN ORGANIZED HEALTH CARE EDUCATION/TRAINING PROGRAM

## 2019-02-14 PROCEDURE — 82595 ASSAY OF CRYOGLOBULIN: CPT

## 2019-02-14 PROCEDURE — 86706 HEP B SURFACE ANTIBODY: CPT

## 2019-02-14 PROCEDURE — 86038 ANTINUCLEAR ANTIBODIES: CPT

## 2019-02-14 PROCEDURE — 85025 COMPLETE CBC W/AUTO DIFF WBC: CPT

## 2019-02-14 PROCEDURE — 86682 HELMINTH ANTIBODY: CPT

## 2019-02-14 PROCEDURE — 86200 CCP ANTIBODY: CPT

## 2019-02-14 PROCEDURE — 86334 IMMUNOFIX E-PHORESIS SERUM: CPT | Mod: 26,,, | Performed by: PATHOLOGY

## 2019-02-14 PROCEDURE — 86334 IMMUNOFIX E-PHORESIS SERUM: CPT

## 2019-02-14 PROCEDURE — 86140 C-REACTIVE PROTEIN: CPT

## 2019-02-14 PROCEDURE — 85613 RUSSELL VIPER VENOM DILUTED: CPT

## 2019-02-14 PROCEDURE — 86787 VARICELLA-ZOSTER ANTIBODY: CPT

## 2019-02-14 PROCEDURE — 86334 PATHOLOGIST INTERPRETATION IFE: ICD-10-PCS | Mod: 26,,, | Performed by: PATHOLOGY

## 2019-02-14 PROCEDURE — 99203 PR OFFICE/OUTPT VISIT, NEW, LEVL III, 30-44 MIN: ICD-10-PCS | Mod: S$GLB,,, | Performed by: STUDENT IN AN ORGANIZED HEALTH CARE EDUCATION/TRAINING PROGRAM

## 2019-02-14 PROCEDURE — 86703 HIV-1/HIV-2 1 RESULT ANTBDY: CPT

## 2019-02-14 PROCEDURE — 99999 PR PBB SHADOW E&M-EST. PATIENT-LVL IV: CPT | Mod: PBBFAC,,, | Performed by: STUDENT IN AN ORGANIZED HEALTH CARE EDUCATION/TRAINING PROGRAM

## 2019-02-14 PROCEDURE — 85652 RBC SED RATE AUTOMATED: CPT

## 2019-02-14 PROCEDURE — 87340 HEPATITIS B SURFACE AG IA: CPT

## 2019-02-14 PROCEDURE — 99999 PR PBB SHADOW E&M-EST. PATIENT-LVL IV: ICD-10-PCS | Mod: PBBFAC,,, | Performed by: STUDENT IN AN ORGANIZED HEALTH CARE EDUCATION/TRAINING PROGRAM

## 2019-02-14 PROCEDURE — 99203 OFFICE O/P NEW LOW 30 MIN: CPT | Mod: S$GLB,,, | Performed by: STUDENT IN AN ORGANIZED HEALTH CARE EDUCATION/TRAINING PROGRAM

## 2019-02-14 PROCEDURE — 86235 NUCLEAR ANTIGEN ANTIBODY: CPT

## 2019-02-14 PROCEDURE — 86147 CARDIOLIPIN ANTIBODY EA IG: CPT | Mod: 59

## 2019-02-14 PROCEDURE — 86790 VIRUS ANTIBODY NOS: CPT

## 2019-02-14 PROCEDURE — 86431 RHEUMATOID FACTOR QUANT: CPT

## 2019-02-14 PROCEDURE — 86592 SYPHILIS TEST NON-TREP QUAL: CPT

## 2019-02-14 PROCEDURE — 86160 COMPLEMENT ANTIGEN: CPT | Mod: 59

## 2019-02-14 RX ORDER — AMLODIPINE BESYLATE 2.5 MG/1
2.5 TABLET ORAL DAILY
Qty: 30 TABLET | Refills: 11 | Status: SHIPPED | OUTPATIENT
Start: 2019-02-14 | End: 2020-09-23

## 2019-02-14 NOTE — PATIENT INSTRUCTIONS
Raynaud Disease  Your healthcare provider has told you that you have Raynaud disease. It is also called Raynaud phenomenon or Raynaud syndrome. There is no cure for Raynaud disease, but you can manage it to help prevent attacks.    What are the symptoms of Raynaud disease?  A Raynaud disease attack is often triggered by cold or stress. During an attack, blood vessels suddenly narrow (called vasospasm).  This most often happens in fingers and toes. In rare cases, the nose, ears, or even tongue are affected. Narrowed blood vessels reduce the blood supply to the area. The area then turns white, then blue. The area may feel numb or painful. As the attack passes, the blood vessels open. The affected area may turn bright red as it warms up, then returns to normal color.  What is the cause of Raynaud disease?  With Raynaud disease, it is believed that blood vessels in the affected areas overrespond to certain triggers, such as cold. This makes them narrow (called vasospasm) much more than in people without the disease. What causes the blood vessels to react so strongly to certain triggers is unknown. In between attacks, the blood vessels are normal and healthy. Attacks dont permanently damage the blood vessels, but may thicken the artery walls.   In some cases, Raynaud disease happens along with another disease or condition. This is often a connective tissue disorder, such as lupus, scleroderma, or rheumatoid arthritis. This is called secondary Raynaud disease (as opposed to primary Raynaud disease discussed above) and may be more severe. If this is the case for you, you and your healthcare provider can discuss treatment for the underlying condition.  What are the risk factors?  Risk factors for Raynaud disease include:  · Women are more likely to get Raynaud disease than men.  · Younger individuals are at higher risk, usually ages 15 to 30.  · Living in colder climates increases risk.  · Having a family member with  Raynaud disease increases one's risk.  · Underlying rheumatoid conditions may increase one's risk.   What are possible triggers?  Triggers for Raynaud disease include:   · Cold  · Stress  · Caffeine  · Smoking  · Repetitive movements  · Certain medicines, such as beta-blockers, migraine medicine, birth control pills and others  · Injury  How is Raynaud disease diagnosed?  Your description of your symptoms, a health history, and a physical exam are often enough for a diagnosis. Blood tests and other tests may be done to see if any underlying conditions are present and rule out other problems.  How is Raynaud disease treated?  There is no cure for Raynaud disease. But you can control symptoms and reduce the number and severity of attacks. For most people, avoiding triggers is enough to limit attacks. Your healthcare provider may suggest the following:  · Take precautions to help prevent your hands and feet from losing circulation. This includes:  ¨ Dressing warmly in cold weather.  ¨ Wear gloves or mittens when your hands may become cold, such as when you use the refrigerator or freezer.  ¨ Avoid stress and caffeine.  ¨ Exercise regularly. This may reduce the number and severity of attacks.   ¨ If you smoke, quitting may improve the condition. This is because smoking causes your blood vessels to narrow and reduces blood flow.  · Soak your hands or feet in warm (not hot) water. Do this at the first sign of attack. Keep soaking until your skin color returns to normal.  In some people, symptoms are persistent or troubling. For these cases, other treatments are a choice. Your healthcare provider can tell you more about the following:  · Prescription medicines that relax and widen blood vessels, such as calcium channel blockers. These may help relieve symptoms.  · Nerve surgery for severe cases that dont respond to other treatments. Surgery removes the nerves that surround the blood vessels in the hands and feet. Without  nerve stimulation, the blood vessels stay more relaxed. They are less likely to become very narrow due to stimulus. Nerves may be blocked using injections in some cases.  Most cases of Raynaud disease are not cause for concern. The disease doesnt get worse and isnt likely to cause any permanent damage. If attacks are severe, very prolonged, or very often, skin damage may result. Controlling attacks can help prevent this.  When to seek medical care  The following problems happen rarely, but they can be serious. Call your healthcare provider right away if you notice any of the following:  · Infection or sores on the skin  · A finger or toe turns black  · The skin breaks open on its own  · A rash develops  · A finger or toe joint becomes painful or swollen   Date Last Reviewed: 1/27/2016  © 0599-5816 [employer]. [address] [place] [address], [place] [address]. All rights reserved. This information is not intended as a substitute for professional medical care. Always follow your healthcare professional's instructions.        Taking Amlodipine  Amlodipine ([id number]-peen) is a calcium channel blocker. It helps relax your blood vessels and get more blood and oxygen to your heart. Relaxing the blood vessels also helps lower your blood pressure and relieve any chest pain you may have.     Each time you take your medicine, johan it on the calendar so you won't forget.     Medication tips  · Read the fact sheet that comes with your medicine. It tells you when and how to take it. Ask for a sheet if you dont get one.  · Take your medicine at the same time each day. If it upsets your stomach, take it with food or milk.  · If you miss a dose, take it as soon as you remember -- unless it is almost time for your next dose. If so, skip the missed dose. Do not take a double dose.  · Call your doctor or pharmacist if you have any questions about taking your medicine.  · Take your medicine even if you feel well. Most people  with high blood pressure dont feel sick.  For your safety  · Ask your doctor or pharmacist if there are any foods or medicines you should avoid.  · To prevent dizziness, get up slowly after sitting or lying down.  · Do not stop taking your medicine unless your doctor tells you to. Doing so can make your condition worse. When stopping this medicine, the dose may need to be slowly decreased.  · Tell your doctor or pharmacist before taking any other prescription or over-the-counter medicines. This includes vitamin or mineral supplements and herbal remedies.  · Talk to your doctor or pharmacist about whether drinking alcohol is safe while taking amlodipine.  · Be sure to refill your prescription before you run out.  · Do not share your medicine with anyone.  · Ask your doctor how often you should have your blood pressure checked.  When to seek medical advice  Call your healthcare provider right away if any of these occur:  · You notice swelling in your ankles or feet or your skin flushes  · You have a headache or nausea  · You feel tired or weak  · You have severe dizziness  · You feel chest pain  · You have trouble breathing  · You develop a skin rash or itching   Date Last Reviewed: 6/1/2016  © 9772-7210 [employer]. [address] [place] [address], [place] [address]. All rights reserved. This information is not intended as a substitute for professional medical care. Always follow your healthcare professional's instructions.

## 2019-02-14 NOTE — PROGRESS NOTES
I have reviewed the notes,documentation by  and I agree with the recommendations    38 year old male comes in with raynaud's     He has ADHD     On and off for few years  A year ago : blue and white color to red color changes noted     Has involvement of elbows,knees,feet and ankles     Associated symptoms : paresthesias +  Fatigue+  Facial rash+  Arthralgias UE  Dry eyes and dry mouth  Sob and palpitations  Chills and night sweats     Family h/o systemic lupus     Exam      Cold peripheries  Cyanotic colored digits  Facial erythema +     Plan     RK panel,SSA  RA panel  ESR,CRP  UA  Complements  APLAS  Cryos  Predmard  Scleroderma panel     Dermatology eval of the facial rash     Norvasc 2.5 mg  Avoid cold temperatures  Avoid rapid shifts of temperatures  Avoid cold/damp breezes  Wear layers of loose fitting clothes over the torso,preferably wear clothes made of cotton/wool  Wear a hat and cover face/ears with a scarf  Wear good shoes and boots that fit well and dont injure the skin  Wear heavy protective socks  Wear gloves/mittens which are warm  Always set thermostat at a good temperature  Always keep a sweater or jacket  Use flannel sheets,warm blankets,electric blankets,preheated beds  Avoid touching cold water  Wear gloves to reach into freezers  Use insulated containers,gloves or napkins to hold cold drinks and food  Rinse vegetables with warm water,protect hands while washing dishes  Use disposable chemical heat packs for extra heat  Protect the skin using lotion containing lanolin on hands and feet  Wash with mild creamy soap  Examine for finger and toe tip ulcers  Nail care very important   Avoid stress  Dont smoke  If you have an episode :  Swing them around as if you are going to throw a soft ball  Warm your fingers by placing them under armpits  Wiggle your fingers and toes  Move and walk around   Run warm water until normal skin color returns

## 2019-02-14 NOTE — PROGRESS NOTES
38 year old male comes in with raynaud's    He has ADHD    On and off for few years  A year ago : blue and white color to red color changes noted    Has involvement of elbows,knees,feet and ankles    Associated symptoms : paresthesias +  Fatigue+  Facial rash+  Arthralgias UE  Dry eyes and dry mouth  Sob and palpitations  Chills and night sweats    Family h/o systemic lupus    Exam     Cold peripheries  Cyanotic colored digits  Facial erythema +    Plan    RK panel,SSA  RA panel  ESR,CRP  UA  Complements  APLAS  Cryos  Predmard  Scleroderma panel    Dermatology eval of the facial rash    Norvasc 2.5 mg  Avoid cold temperatures  Avoid rapid shifts of temperatures  Avoid cold/damp breezes  Wear layers of loose fitting clothes over the torso,preferably wear clothes made of cotton/wool  Wear a hat and cover face/ears with a scarf  Wear good shoes and boots that fit well and dont injure the skin  Wear heavy protective socks  Wear gloves/mittens which are warm  Always set thermostat at a good temperature  Always keep a sweater or jacket  Use flannel sheets,warm blankets,electric blankets,preheated beds  Avoid touching cold water  Wear gloves to reach into freezers  Use insulated containers,gloves or napkins to hold cold drinks and food  Rinse vegetables with warm water,protect hands while washing dishes  Use disposable chemical heat packs for extra heat  Protect the skin using lotion containing lanolin on hands and feet  Wash with mild creamy soap  Examine for finger and toe tip ulcers  Nail care very important   Avoid stress  Dont smoke  If you have an episode :  Swing them around as if you are going to throw a soft ball  Warm your fingers by placing them under armpits  Wiggle your fingers and toes  Move and walk around   Run warm water until normal skin color returns

## 2019-02-15 LAB
CARDIOLIPIN IGG SER IA-ACNC: <9.4 GPL
CARDIOLIPIN IGM SER IA-ACNC: <9.4 MPL
INTERPRETATION SERPL IFE-IMP: NORMAL
LA PPP-IMP: NEGATIVE
PATHOLOGIST INTERPRETATION IFE: NORMAL
RPR SER QL: NORMAL
STRONGYLOIDES ANTIBODY IGG: NEGATIVE

## 2019-02-16 VITALS
HEART RATE: 65 BPM | BODY MASS INDEX: 25.83 KG/M2 | WEIGHT: 174.38 LBS | HEIGHT: 69 IN | DIASTOLIC BLOOD PRESSURE: 76 MMHG | TEMPERATURE: 99 F | OXYGEN SATURATION: 99 % | SYSTOLIC BLOOD PRESSURE: 110 MMHG

## 2019-02-16 LAB
B2 GLYCOPROT1 IGA SER QL: <9 SAU
B2 GLYCOPROT1 IGG SER QL: <9 SGU
B2 GLYCOPROT1 IGM SER QL: <9 SMU
VARICELLA INTERPRETATION: POSITIVE
VARICELLA ZOSTER IGG: 3.23 ISR

## 2019-02-17 NOTE — PROGRESS NOTES
Subjective:       Patient ID: Juan Manuel Hdz is a 38 y.o. male.    Chief Complaint: Establish Care    HPI  He complains of discomfort of his bilateral feet and hands.  Discomfort has been intermittent for years.  Sometimes associated with skin discoloration.  Sometimes aggravated by cold    Past medical history:  Attention deficit disorder, asthma, status post appendectomy, status post cervical diskectomy    Medications:  Adderall    No known drug allergies      Review of Systems   Constitutional: Negative for chills, fatigue, fever and unexpected weight change.   Respiratory: Negative for chest tightness and shortness of breath.    Cardiovascular: Negative for chest pain and palpitations.   Gastrointestinal: Negative for abdominal pain and blood in stool.   Neurological: Negative for dizziness, syncope, numbness and headaches.       Objective:      Physical Exam   HENT:   Right Ear: External ear normal.   Left Ear: External ear normal.   Nose: Nose normal.   Mouth/Throat: Oropharynx is clear and moist.   Eyes: Pupils are equal, round, and reactive to light.   Neck: Normal range of motion.   Cardiovascular: Normal rate and regular rhythm.   No murmur heard.  Pulmonary/Chest: Breath sounds normal.   Abdominal: He exhibits no distension. There is no hepatomegaly. There is no tenderness.   Lymphadenopathy:     He has no cervical adenopathy.     He has no axillary adenopathy.   Neurological: He has normal strength and normal reflexes. No cranial nerve deficit or sensory deficit.       Assessment/Plan       Assessment and plan:  Limb pain:  Check EMG, check  NATALIIA.  Check CMP, lipid panel, CBC, TSH, B12, folate

## 2019-02-18 LAB
ANA SER QL IF: NORMAL
ENA SCL70 AB SER-ACNC: 7 UNITS

## 2019-02-25 LAB — CRYOGLOB SER QL: NORMAL

## 2019-02-27 ENCOUNTER — OFFICE VISIT (OUTPATIENT)
Dept: OPTOMETRY | Facility: CLINIC | Age: 39
End: 2019-02-27
Payer: COMMERCIAL

## 2019-02-27 DIAGNOSIS — H17.9 CORNEAL SCARRING: ICD-10-CM

## 2019-02-27 DIAGNOSIS — Z13.5 SCREENING FOR EYE CONDITION: ICD-10-CM

## 2019-02-27 DIAGNOSIS — H57.89 IRRITATION OF LEFT EYE: Primary | ICD-10-CM

## 2019-02-27 PROCEDURE — 92004 PR EYE EXAM, NEW PATIENT,COMPREHESV: ICD-10-PCS | Mod: S$GLB,,, | Performed by: OPTOMETRIST

## 2019-02-27 PROCEDURE — 99999 PR PBB SHADOW E&M-EST. PATIENT-LVL III: ICD-10-PCS | Mod: PBBFAC,,, | Performed by: OPTOMETRIST

## 2019-02-27 PROCEDURE — 92004 COMPRE OPH EXAM NEW PT 1/>: CPT | Mod: S$GLB,,, | Performed by: OPTOMETRIST

## 2019-02-27 PROCEDURE — 99999 PR PBB SHADOW E&M-EST. PATIENT-LVL III: CPT | Mod: PBBFAC,,, | Performed by: OPTOMETRIST

## 2019-02-27 NOTE — PATIENT INSTRUCTIONS
Mr. Hdz reports symptoms of recurrent episodes of irritation of the left eye (only), most recently several days ago.  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  No treatment needed.  Discussed possible etiology of marginal corneal ulcer(s).  Suggested daily lid scrubs.    Return in one month for progress check on cornea of left eye, and refraction - or prior if he notes any apparent recurrence of symptoms in the left eye.

## 2019-02-27 NOTE — LETTER
February 27, 2019      Ara Newman PA-C  [address]  [facility] [address]           Greg Dennise - Optometry  [address]  [facility] [address]  Phone: [phone]  Fax: [phone]          Patient: Juan Manuel Hdz   MR Number: [id number]   YOB: 1980   Date of Visit: 2/27/2019       Dear Ara Newman:    Thank you for referring Juan Manuel Hdz to me for evaluation. Attached you will find relevant portions of my assessment and plan of care.    If you have questions, please do not hesitate to call me. I look forward to following Juan Manuel Hdz along with you.    Sincerely,    Max Villalta, OD    Enclosure  CC:  No Recipients    If you would like to receive this communication electronically, please contact [email or web address] or [phone] to request more information on [facility] access.    For providers and/or their staff who would like to refer a patient to [facility], please contact us through our one-stop-shop provider referral line, [facility], at [phone].    If you feel you have received this communication in error or would no longer like to receive these types of communications, please e-mail [email or web address]

## 2019-02-28 NOTE — PROGRESS NOTES
HPI     Eye Pain      Additional comments: Mr. Hdz reports history of recurrent eye pain -   always OS - typically about 3 - 4 times per year - went to [facility] clinic -   was told apparently corneal abarasion.  Was given eye ointment, but has   discontinued.  Eye feels okay now.  No symptoms in OD.              Comments     Patient's age: 38 y.o. WM  Occupation: [employer]  Approximate date of last general eye examination:  4-5 years ago   Wears glasses? No -was given Rx for spectacle lenses but does not wear   them     Wears CLs?:  no  Headaches?  No often   Eye pain/discomfort?  Recurring episodes of pain in OS - typically   sensitive to light, and VA seems more blurry than usual - feels okay now,   however                                                                                     Flashes?  no  Floaters?  no  Diplopia/Double vision?  no  Patient's Ocular History:         Any eye surgeries? no         Any eye injury?  no         Any treatment for eye disease?  no  Family history of eye disease?  no  Significant patient medical history:         1. Diabetes?  no       If yes, IDDM or NIDDM? n/a   2. HBP?  no              3. Other (describe): Reynaud's Syndrome    ! OTC eyedrops currently using:  no   ! Prescription eye meds currently using:  no   ! Any history of allergy/adverse reaction to any eye meds used   previously?  no    ! Any history of allergy/adverse reaction to eyedrops used during prior   eye exam(s)? no    ! Any history of allergy/adverse reaction to Novacaine or similar meds?   no   ! Any history of allergy/adverse reaction to Epinephrine or similar meds?   no    ! Patient okay with use of anesthetic eyedrops to check eye pressure?    yes        ! Patient okay with use of eyedrops to dilate pupils today?  yes   !  Allergies/Medications/Medical History/Family History reviewed today?    yes      PD =     Desired reading distance =                                                                          Last edited by Max Villalta, OD on 2/27/2019  2:08 PM. (History)            Assessment /Plan     For exam results, see Encounter Report.    1. Irritation of left eye     2. Corneal scarring     3. Screening for eye condition                Mr. Hdz reports symptoms of recurrent episodes of irritation of the left eye (only), most recently several days ago.  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  No treatment needed.  Discussed possible etiology of marginal corneal ulcer(s).  Suggested daily lid scrubs.    Return in one month for progress check on cornea of left eye, and refraction - or prior if he notes any apparent recurrence of symptoms in the left eye.

## 2019-03-21 DIAGNOSIS — F90.0 ATTENTION DEFICIT HYPERACTIVITY DISORDER (ADHD), PREDOMINANTLY INATTENTIVE TYPE: ICD-10-CM

## 2019-03-21 RX ORDER — DEXTROAMPHETAMINE SACCHARATE, AMPHETAMINE ASPARTATE, DEXTROAMPHETAMINE SULFATE AND AMPHETAMINE SULFATE 5; 5; 5; 5 MG/1; MG/1; MG/1; MG/1
1 TABLET ORAL 2 TIMES DAILY
Qty: 60 TABLET | Refills: 0 | Status: SHIPPED | OUTPATIENT
Start: 2019-03-21 | End: 2019-04-24 | Stop reason: SDUPTHER

## 2019-04-25 ENCOUNTER — OFFICE VISIT (OUTPATIENT)
Dept: PSYCHIATRY | Facility: CLINIC | Age: 39
End: 2019-04-25
Payer: COMMERCIAL

## 2019-04-25 VITALS
HEIGHT: 69 IN | SYSTOLIC BLOOD PRESSURE: 138 MMHG | DIASTOLIC BLOOD PRESSURE: 83 MMHG | WEIGHT: 177.13 LBS | BODY MASS INDEX: 26.23 KG/M2 | HEART RATE: 73 BPM

## 2019-04-25 DIAGNOSIS — Z86.79 HISTORY OF PALPITATIONS IN ADULTHOOD: ICD-10-CM

## 2019-04-25 DIAGNOSIS — F90.0 ATTENTION DEFICIT HYPERACTIVITY DISORDER (ADHD), PREDOMINANTLY INATTENTIVE TYPE: Primary | ICD-10-CM

## 2019-04-25 PROCEDURE — 99999 PR PBB SHADOW E&M-EST. PATIENT-LVL III: CPT | Mod: PBBFAC,,, | Performed by: PSYCHIATRY & NEUROLOGY

## 2019-04-25 PROCEDURE — 90833 PR PSYCHOTHERAPY W/PATIENT W/E&M, 30 MIN (ADD ON): ICD-10-PCS | Mod: S$GLB,,, | Performed by: PSYCHIATRY & NEUROLOGY

## 2019-04-25 PROCEDURE — 90833 PSYTX W PT W E/M 30 MIN: CPT | Mod: S$GLB,,, | Performed by: PSYCHIATRY & NEUROLOGY

## 2019-04-25 PROCEDURE — 99213 PR OFFICE/OUTPT VISIT, EST, LEVL III, 20-29 MIN: ICD-10-PCS | Mod: S$GLB,,, | Performed by: PSYCHIATRY & NEUROLOGY

## 2019-04-25 PROCEDURE — 3008F PR BODY MASS INDEX (BMI) DOCUMENTED: ICD-10-PCS | Mod: CPTII,S$GLB,, | Performed by: PSYCHIATRY & NEUROLOGY

## 2019-04-25 PROCEDURE — 3008F BODY MASS INDEX DOCD: CPT | Mod: CPTII,S$GLB,, | Performed by: PSYCHIATRY & NEUROLOGY

## 2019-04-25 PROCEDURE — 99999 PR PBB SHADOW E&M-EST. PATIENT-LVL III: ICD-10-PCS | Mod: PBBFAC,,, | Performed by: PSYCHIATRY & NEUROLOGY

## 2019-04-25 PROCEDURE — 99213 OFFICE O/P EST LOW 20 MIN: CPT | Mod: S$GLB,,, | Performed by: PSYCHIATRY & NEUROLOGY

## 2019-04-25 RX ORDER — DEXTROAMPHETAMINE SACCHARATE, AMPHETAMINE ASPARTATE, DEXTROAMPHETAMINE SULFATE AND AMPHETAMINE SULFATE 5; 5; 5; 5 MG/1; MG/1; MG/1; MG/1
1 TABLET ORAL 2 TIMES DAILY
Qty: 60 TABLET | Refills: 0 | Status: SHIPPED | OUTPATIENT
Start: 2019-05-25 | End: 2019-04-25 | Stop reason: SDUPTHER

## 2019-04-25 RX ORDER — DEXTROAMPHETAMINE SACCHARATE, AMPHETAMINE ASPARTATE, DEXTROAMPHETAMINE SULFATE AND AMPHETAMINE SULFATE 5; 5; 5; 5 MG/1; MG/1; MG/1; MG/1
1 TABLET ORAL 2 TIMES DAILY
Qty: 60 TABLET | Refills: 0 | Status: SHIPPED | OUTPATIENT
Start: 2019-04-25 | End: 2019-04-25 | Stop reason: SDUPTHER

## 2019-04-25 RX ORDER — DEXTROAMPHETAMINE SACCHARATE, AMPHETAMINE ASPARTATE, DEXTROAMPHETAMINE SULFATE AND AMPHETAMINE SULFATE 5; 5; 5; 5 MG/1; MG/1; MG/1; MG/1
1 TABLET ORAL 2 TIMES DAILY
Qty: 60 TABLET | Refills: 0 | Status: SHIPPED | OUTPATIENT
Start: 2019-06-25 | End: 2019-08-20 | Stop reason: SDUPTHER

## 2019-04-25 NOTE — PROGRESS NOTES
ESTABLISHED OUTPATIENT VISIT   E/M LEVEL 3: [id number]    ENCOUNTER DATE: 4/25/2019  SITE: [facility]      HISTORY    CHIEF COMPLAINT   Juan Manuel Hdz is a 38 y.o. male who presents for followup of   Chief Complaint   Patient presents with    Inattention    Distractibilty       HPI   (Elements: Location, Quality, Severity, Duration, Timing, Content, Modifying Factors, Associated Signs & Symptoms)    Patient with history of ADHD, here for disease maintenance.  He is here for follow up appointment.  Last session we titrated adderall to 20mg bid as it was only partially effective at 10mg bid.  He finds it more efficacious at this dose but still at times not fully so.  No side effects noted.  Denies cardiac issues.  Sleep intact.  Appetite mildly curbed with adderall but weight up 8lbs intersession.  No depression, psychosis, SI.  Mild anxiety - tolerable.  He does skip adderall on weekends and holidays.  Recently diagnosed with Raynaud's.  Since last session he is  and has 2 month old son.  He is happy in marriage, son doing well.  Work boring but no issues.      8/30/18 - continues to report reduced efficacy on current dose.  Dose titrated to adderall 20mg bid.  2/8/18 - partial effect with 20mg of adderall - taking 30 or even 40mg some days, dose continued at 10mg bid  12/7/16 - off adderall for some time, worsening procrastination/distraction at work, adderall restarted 10mg bid  10/8/15 - stable, no med changes  7/10/15 - seen by resident, stable, no med changes  9/25/14 - stable, no med changes    ROS   Cardiovascular ROS: negative for - chest pain/palpitations    Neuro ROS: no tremor        PFSH  Past Medical History reviewed: Yes  Family History reviewed: Yes  Social History reviewed: No      PSYCHOTROPIC MEDICATIONS   Adderall 20mg bid       EXAMINATION    VITALS   Most recent vital signs, dated less than 90 days prior to this appointment, were reviewed.   Vitals:     "04/25/19 1635   BP: 138/83   Pulse: 73       CONSTITUTIONAL  General Appearance: stated age, casually dressed    MUSCULOSKELETAL  Muscle Strength and Tone: no weakness or spasticity  Abnormal Involuntary Movements: no tremor or tic noted  Gait and Station: ambulates without assistance    PSYCHIATRIC   Level of Consciousness: alert  Orientation: to person, place, time  Grooming: well groomed  Psychomotor Behavior: no retardation or agitation  Speech: conversational, spontaneous  Language: fluent english, repeats words/phrases, mild lisp  Mood: "fine"  Affect: euthymic, pleasant, subdued  Thought Process: linear  Associations: intact, no loosening of associations  Thought Content: no SI  Memory: intact  Attention: +distractibility noted  Fund of Knowledge: intact  Insight: intact  Judgment: intact    MEDICAL DECISION MAKING    DIAGNOSES  ADHD    PROBLEM LIST AND MANAGEMENT PLANS    New problem(s) (3 points each):   - none    Established problem(s), worsening (2 points each):   - none    Established problem(s), stable/improved (1 point each):  - ADHD.  Continue adderall as prescribed.  [place] prescription monitoring site checked - no problems noted  - palpitations.  An issue in fall 2013 but not currently.  Cardiology cleared him to take stimulants.  - work/relationship problems.  Currently no major issues.  Supportive psychotherapy provided during session concerning role changes (, father).        PRESCRIPTION DRUG MANAGEMENT  Compliance: currently taking meds as prescribed  Side Effects: none save mild appetite loss  Regimen Adjustments: cont Adderall 20mg bid      DIAGNOSTIC TESTING  EKG 11/13/13 - QTc 397  2/14/19 - CMP, CBC reviewed  2/11/19 - lipid panel reviewed      David Kelly      PSYCHOTHERAPY ADD-ON +[id number]   30 (16-37*) minutes    Site: [facility] [address]  Time: 16 minutes  Participants: Met with patient    Therapeutic Intervention Type: supportive psychotherapy  Why chosen " therapy is appropriate versus another modality: relevant to diagnosis    Target symptoms: distractability  Primary focus: life adjustments - fatherhood, marriage, work  Psychotherapeutic techniques: supportive listening, exploratory questions, clarification    Outcome monitoring methods: self-report, observation    Patient's response to intervention:  The patient's response to intervention is accepting.    Progress toward goals:  The patient's progress toward goals is fair .

## 2019-05-12 NOTE — TELEPHONE ENCOUNTER
----- Message from Heide Reece sent at 11/21/2017  2:27 PM CST -----  Contact: ciro Levin Called and wanted to schedule a 2 weeks post appointment with  And also had general questions. Please call Juan Manuel @ [phone] . Thanks :)       Problem: Respiratory Impairment - Respiratory Therapy 253  Intervention: Volume expansion techniques  Intervention Status  Done  Intervention: Inhaled gas administration  Intervention Status  Done

## 2019-08-21 ENCOUNTER — OFFICE VISIT (OUTPATIENT)
Dept: PSYCHIATRY | Facility: CLINIC | Age: 39
End: 2019-08-21
Payer: COMMERCIAL

## 2019-08-21 VITALS
SYSTOLIC BLOOD PRESSURE: 128 MMHG | DIASTOLIC BLOOD PRESSURE: 79 MMHG | WEIGHT: 170.63 LBS | BODY MASS INDEX: 25.27 KG/M2 | HEIGHT: 69 IN | HEART RATE: 79 BPM

## 2019-08-21 DIAGNOSIS — Z86.79 HISTORY OF PALPITATIONS IN ADULTHOOD: ICD-10-CM

## 2019-08-21 DIAGNOSIS — I73.00 RAYNAUD'S DISEASE WITHOUT GANGRENE: ICD-10-CM

## 2019-08-21 DIAGNOSIS — F90.0 ATTENTION DEFICIT HYPERACTIVITY DISORDER (ADHD), PREDOMINANTLY INATTENTIVE TYPE: Primary | ICD-10-CM

## 2019-08-21 PROCEDURE — 99213 PR OFFICE/OUTPT VISIT, EST, LEVL III, 20-29 MIN: ICD-10-PCS | Mod: S$GLB,,, | Performed by: PSYCHIATRY & NEUROLOGY

## 2019-08-21 PROCEDURE — 99999 PR PBB SHADOW E&M-EST. PATIENT-LVL III: CPT | Mod: PBBFAC,,, | Performed by: PSYCHIATRY & NEUROLOGY

## 2019-08-21 PROCEDURE — 3008F BODY MASS INDEX DOCD: CPT | Mod: CPTII,S$GLB,, | Performed by: PSYCHIATRY & NEUROLOGY

## 2019-08-21 PROCEDURE — 99213 OFFICE O/P EST LOW 20 MIN: CPT | Mod: S$GLB,,, | Performed by: PSYCHIATRY & NEUROLOGY

## 2019-08-21 PROCEDURE — 3008F PR BODY MASS INDEX (BMI) DOCUMENTED: ICD-10-PCS | Mod: CPTII,S$GLB,, | Performed by: PSYCHIATRY & NEUROLOGY

## 2019-08-21 PROCEDURE — 99999 PR PBB SHADOW E&M-EST. PATIENT-LVL III: ICD-10-PCS | Mod: PBBFAC,,, | Performed by: PSYCHIATRY & NEUROLOGY

## 2019-08-21 RX ORDER — DEXTROAMPHETAMINE SACCHARATE, AMPHETAMINE ASPARTATE, DEXTROAMPHETAMINE SULFATE AND AMPHETAMINE SULFATE 5; 5; 5; 5 MG/1; MG/1; MG/1; MG/1
1 TABLET ORAL 2 TIMES DAILY
Qty: 60 TABLET | Refills: 0 | Status: SHIPPED | OUTPATIENT
Start: 2019-08-21 | End: 2019-08-21 | Stop reason: SDUPTHER

## 2019-08-21 RX ORDER — DEXTROAMPHETAMINE SACCHARATE, AMPHETAMINE ASPARTATE, DEXTROAMPHETAMINE SULFATE AND AMPHETAMINE SULFATE 5; 5; 5; 5 MG/1; MG/1; MG/1; MG/1
1 TABLET ORAL 2 TIMES DAILY
Qty: 60 TABLET | Refills: 0 | Status: SHIPPED | OUTPATIENT
Start: 2019-09-21 | End: 2019-08-21 | Stop reason: SDUPTHER

## 2019-08-21 RX ORDER — DEXTROAMPHETAMINE SACCHARATE, AMPHETAMINE ASPARTATE, DEXTROAMPHETAMINE SULFATE AND AMPHETAMINE SULFATE 5; 5; 5; 5 MG/1; MG/1; MG/1; MG/1
1 TABLET ORAL 2 TIMES DAILY
Qty: 60 TABLET | Refills: 0 | Status: SHIPPED | OUTPATIENT
Start: 2019-10-21 | End: 2019-11-18 | Stop reason: SDUPTHER

## 2019-08-21 NOTE — PROGRESS NOTES
ESTABLISHED OUTPATIENT VISIT   E/M LEVEL 3: [id number]    ENCOUNTER DATE: 8/21/2019  SITE: [facility]      HISTORY    CHIEF COMPLAINT   Juan Manuel Hdz is a 38 y.o. male who presents for followup of   Chief Complaint   Patient presents with    Inattention    Distractibilty       HPI   (Elements: Location, Quality, Severity, Duration, Timing, Content, Modifying Factors, Associated Signs & Symptoms)    Patient with history of ADHD, here for disease maintenance.  He is here for follow up appointment.  No issues with his adderall.  If he doesn't take it for some time (like if he is off work), he notes one night of insomnia but otherwise no issues.  No cardiac issues - no palpitations.  No tremor.  No depression, SI, paranoia.  No significant anxiety.  Raynaud's doing well this summer.  He is on keto diet and has lost 7lbs - reports energy and focus both improved.  Son is now 6 months old.  Marriage happy.  Work is ok - no issues.      4/25/19 - stable, no med changes  8/30/18 - continues to report reduced efficacy on current dose.  Dose titrated to adderall 20mg bid.  2/8/18 - partial effect with 20mg of adderall - taking 30 or even 40mg some days, dose continued at 10mg bid  12/7/16 - off adderall for some time, worsening procrastination/distraction at work, adderall restarted 10mg bid  10/8/15 - stable, no med changes  7/10/15 - seen by resident, stable, no med changes  9/25/14 - stable, no med changes    ROS   Cardiovascular ROS: negative for - chest pain/palpitations    Neuro ROS: no tremor  GI: no N/V        PFSH  Past Medical History reviewed: Yes  Family History reviewed: Yes  Social History reviewed: No      PSYCHOTROPIC MEDICATIONS   Adderall 20mg bid       EXAMINATION    VITALS   Most recent vital signs, dated less than 90 days prior to this appointment, were reviewed.   Vitals:    08/21/19 0934   BP: 128/79   Pulse: 79       CONSTITUTIONAL  General Appearance: stated age,  "casually dressed    MUSCULOSKELETAL  Muscle Strength and Tone: no weakness or spasticity  Abnormal Involuntary Movements: no tremor or tic noted  Gait and Station: ambulates without assistance    PSYCHIATRIC   Level of Consciousness: alert  Orientation: to person, place, time  Grooming: well groomed  Psychomotor Behavior: no retardation or agitation  Speech: conversational, spontaneous  Language: fluent english, repeats words/phrases, mild lisp  Mood: "fine"  Affect: euthymic, pleasant  Thought Process: linear  Associations: intact, no loosening of associations  Thought Content: no SI  Memory: intact  Attention: +distractibility noted  Fund of Knowledge: intact  Insight: intact  Judgment: intact    MEDICAL DECISION MAKING    DIAGNOSES  ADHD    PROBLEM LIST AND MANAGEMENT PLANS    New problem(s) (3 points each):   - none    Established problem(s), worsening (2 points each):   - none    Established problem(s), stable/improved (1 point each):  - ADHD.  Continue adderall as prescribed.  [place] prescription monitoring site checked - no problems noted  - palpitations.  An issue in fall 2013 but not currently.  Cardiology cleared him to take stimulants.  - work/relationship problems.  Currently no major issues.  Supportive psychotherapy provided during session concerning role changes (, father).  - Raynaud's.  Follow with MD.  No current issues.        PRESCRIPTION DRUG MANAGEMENT  Compliance: currently taking meds as prescribed  Side Effects: none   Regimen Adjustments: cont Adderall 20mg bid      DIAGNOSTIC TESTING  EKG 11/13/13 - QTc 397  2/14/19 - CMP, CBC reviewed  2/11/19 - lipid panel reviewed      David Kelly      "

## 2019-09-17 ENCOUNTER — IMMUNIZATION (OUTPATIENT)
Dept: INTERNAL MEDICINE | Facility: CLINIC | Age: 39
End: 2019-09-17
Payer: COMMERCIAL

## 2019-09-17 ENCOUNTER — OFFICE VISIT (OUTPATIENT)
Dept: INTERNAL MEDICINE | Facility: CLINIC | Age: 39
End: 2019-09-17
Payer: COMMERCIAL

## 2019-09-17 VITALS
HEIGHT: 69 IN | DIASTOLIC BLOOD PRESSURE: 70 MMHG | HEART RATE: 95 BPM | SYSTOLIC BLOOD PRESSURE: 110 MMHG | WEIGHT: 164 LBS | BODY MASS INDEX: 24.29 KG/M2

## 2019-09-17 DIAGNOSIS — Z00.00 ENCOUNTER FOR PREVENTIVE HEALTH EXAMINATION: Primary | ICD-10-CM

## 2019-09-17 DIAGNOSIS — Z23 NEED FOR TDAP VACCINATION: ICD-10-CM

## 2019-09-17 DIAGNOSIS — Z23 NEED FOR INFLUENZA VACCINATION: ICD-10-CM

## 2019-09-17 PROCEDURE — 90471 IMMUNIZATION ADMIN: CPT | Mod: S$GLB,,, | Performed by: PHYSICIAN ASSISTANT

## 2019-09-17 PROCEDURE — 90715 TDAP VACCINE GREATER THAN OR EQUAL TO 7YO IM: ICD-10-PCS | Mod: S$GLB,,, | Performed by: PHYSICIAN ASSISTANT

## 2019-09-17 PROCEDURE — 90472 IMMUNIZATION ADMIN EACH ADD: CPT | Mod: S$GLB,,, | Performed by: PHYSICIAN ASSISTANT

## 2019-09-17 PROCEDURE — 99395 PR PREVENTIVE VISIT,EST,18-39: ICD-10-PCS | Mod: 25,S$GLB,, | Performed by: PHYSICIAN ASSISTANT

## 2019-09-17 PROCEDURE — 90715 TDAP VACCINE 7 YRS/> IM: CPT | Mod: S$GLB,,, | Performed by: PHYSICIAN ASSISTANT

## 2019-09-17 PROCEDURE — 99395 PREV VISIT EST AGE 18-39: CPT | Mod: 25,S$GLB,, | Performed by: PHYSICIAN ASSISTANT

## 2019-09-17 PROCEDURE — 90471 TDAP VACCINE GREATER THAN OR EQUAL TO 7YO IM: ICD-10-PCS | Mod: S$GLB,,, | Performed by: PHYSICIAN ASSISTANT

## 2019-09-17 PROCEDURE — 90686 FLU VACCINE (QUAD) GREATER THAN OR EQUAL TO 3YO PRESERVATIVE FREE IM: ICD-10-PCS | Mod: S$GLB,,, | Performed by: PHYSICIAN ASSISTANT

## 2019-09-17 PROCEDURE — 99999 PR PBB SHADOW E&M-EST. PATIENT-LVL III: CPT | Mod: PBBFAC,,, | Performed by: PHYSICIAN ASSISTANT

## 2019-09-17 PROCEDURE — 90686 IIV4 VACC NO PRSV 0.5 ML IM: CPT | Mod: S$GLB,,, | Performed by: PHYSICIAN ASSISTANT

## 2019-09-17 PROCEDURE — 90472 FLU VACCINE (QUAD) GREATER THAN OR EQUAL TO 3YO PRESERVATIVE FREE IM: ICD-10-PCS | Mod: S$GLB,,, | Performed by: PHYSICIAN ASSISTANT

## 2019-09-17 PROCEDURE — 99999 PR PBB SHADOW E&M-EST. PATIENT-LVL III: ICD-10-PCS | Mod: PBBFAC,,, | Performed by: PHYSICIAN ASSISTANT

## 2019-09-17 NOTE — PATIENT INSTRUCTIONS
Schedule rheumatology follow up    Flu shot and Tdap today (tetanus vaccine)      Prevention Guidelines, Men Ages 18 to 39  Screening tests and vaccines are an important part of managing your health. Health counseling is essential, too. Below are guidelines for these, for men ages 18 to 39. Talk with your healthcare provider to make sure youre up-to-date on what you need.  Screening Who needs it How often   Alcohol misuse All men in this age group At routine exams   Blood pressure All men in this age group Every 2 years if your blood pressure is less than 120/80 mm Hg; yearly if your systolic blood pressure is 120 to 139 mm Hg, or your diastolic blood pressure reading is 80 to 89 mm Hg   Depression All men in this age group At routine exams   Diabetes mellitus, type 2 Adults who have no symptoms but are overweight or obese and have 1 or more other risk factors for diabetes At least every 3 years (yearly if blood sugar has already started to rise)   Hepatitis C If at increased risk At routine exams   High cholesterol or triglycerides All men ages 35 and older, and younger men at high risk for coronary artery disease At least every 5 years   HIV All men At routine exams   Obesity All men in this age group At routine exams   Syphilis Men at increased risk for infection - talk with your healthcare provider At routine exams   Tuberculosis Men at increased risk for infection - talk with your healthcare provider Check with your healthcare provider   Vision All men in this age group Every 5 to 10 years if no risk factors for eye disease   Vaccines Who needs it How often   Chickenpox (varicella) All men in this age group who have no record of this infection or vaccine 2 doses; the second dose should be given at least 4 weeks after the first dose   Hepatitis A Men at increased risk for infection - talk with your healthcare provider 2 doses given at least 6 months apart   Hepatitis B Men at increased risk for infection - talk  with your healthcare provider 3 doses over 6 months; second dose should be given 1 month after the first dose; the third dose should be given at least 2 months after the second dose and at least 4 months after the first dose   Haemophilus influenzae Type B (HIB) Men at increased risk for infection - talk with your healthcare provider 1 to 3 doses   Human papillomavirus (HPV) All men in this age group up to age 26 3 doses; the second dose should be given 1 to 2 months after the first dose and the third dose given 6 months after the first dose   Influenza (flu) All men in this age group Once a year   Measles, mumps, rubella (MMR) All men in this age group who have no record of these infections or vaccines 1 or 2 doses through age 55   Meningococcal Men at increased risk for infection - talk with your healthcare provider 1 or more doses   Pneumococca (PCV13) and Pneumococcal (PPSV23) Men at increased risk for infection - talk with your healthcare provider PCV13: 1 dose ages 19 to 65 (protects against 13 types of pneumococcal bacteria)  PPSV23: 1 to 2 doses through age 64, or 1 dose at 65 or older (protects against 23 types of pneumococcal bacteria)   Tetanus/diphtheria/pertussis (Td/Tdap) booster All men in this age group A one-time Tdap booster after age 18, then Td every10 years   Counseling Who needs it How often   Diet and exercise Overweight or obese people When diagnosed, and then at routine exams   Use of tobacco and the health effects it can cause All men in this age group Every visit   Sexually transmitted infection prevention Men who are sexually active At routine exams   Skin cancer Prevention of skin cancer in fair-skinned adults through age 24 At routine exams   1Those who are 18 years of age, who are not up-to-date on their childhood immunizations, should receive all appropriate catch-up vaccines recommended by the CDC.  Date Last Reviewed: 2/1/2017  © 1024-7343 [employer]. [address] [place] [address]  [place] [address]. All rights reserved. This information is not intended as a substitute for professional medical care. Always follow your healthcare professional's instructions.

## 2019-09-17 NOTE — PROGRESS NOTES
Subjective:       Patient ID: Juan Manuel Hdz is a 38 y.o. male.        Chief Complaint: Annual Exam    Juan Manuel Hdz is an established patient of Haven Ogden MD here today for biometric screening and form completion for employer.    Feeling well.  He has a 7-month-old son and stress has been better.    Raynaud's - never started amlodipine or followed up with rheum.    ADHD - taking adderall.    Asthma - no issues with wheezing.         Review of Systems   Constitutional: Negative for appetite change, chills, fatigue and fever.   HENT: Negative for congestion and sore throat.    Eyes: Negative for visual disturbance.   Respiratory: Negative for cough, chest tightness and shortness of breath.    Cardiovascular: Negative for chest pain, palpitations and leg swelling.   Gastrointestinal: Negative for abdominal pain, blood in stool, constipation, diarrhea, nausea and vomiting.   Genitourinary: Negative for dysuria, frequency, hematuria and urgency.   Musculoskeletal: Negative for arthralgias and back pain.   Skin: Negative for rash.   Neurological: Negative for dizziness, syncope, weakness and headaches.   Psychiatric/Behavioral: Negative for dysphoric mood and sleep disturbance. The patient is not nervous/anxious.        Objective:      Physical Exam   Constitutional: He appears well-developed and well-nourished.   HENT:   Head: Normocephalic.   Right Ear: External ear normal.   Left Ear: External ear normal.   Mouth/Throat: Oropharynx is clear and moist.   Eyes: Pupils are equal, round, and reactive to light.   Cardiovascular: Normal rate, regular rhythm and normal heart sounds. Exam reveals no gallop and no friction rub.   No murmur heard.  Pulmonary/Chest: Effort normal and breath sounds normal. No respiratory distress.   Abdominal: Soft. There is no tenderness. There is no CVA tenderness.   Musculoskeletal: He exhibits no edema.   Neurological: He is alert.   Skin: Skin is warm and dry.  "  Psychiatric: He has a normal mood and affect.   Nursing note and vitals reviewed.      Assessment:       1. Encounter for preventive health examination    2. Need for Tdap vaccination    3. Need for influenza vaccination        Plan:       Juan Manuel was seen today for annual exam.    Diagnoses and all orders for this visit:    Encounter for preventive health examination    Need for Tdap vaccination  -     (In Office Administered) Tdap Vaccine    Need for influenza vaccination    S/p lipid panel and CMP 2/2019 - will use these values to complete biometric form.  Waist circumference 31.5 inches.  Flu shot and Tdap today.  Schedule a f/u with rheum.   F/u with PCP.    Pt has been given instructions populated from [employer] database and has verbalized understanding of the after visit summary and information contained wherein.    Follow up with a primary care provider. May go to ER for acute shortness of breath, lightheadedness, fever, or any other emergent complaints or changes in condition.    "This note will be shared with the patient"    Future Appointments   Date Time Provider Department Center   9/17/2019  9:10 AM FLU, INTERNAL MEDICINE [facility] IM [facility] [address]   11/20/2019  9:00 AM David Kelly MD [facility] PSYCH [facility]               "

## 2019-09-18 ENCOUNTER — TELEPHONE (OUTPATIENT)
Dept: INTERNAL MEDICINE | Facility: CLINIC | Age: 39
End: 2019-09-18

## 2019-09-18 NOTE — TELEPHONE ENCOUNTER
Please call patient.    Form is complete.  Please fax copy to .    He can come  original or we can mail it to him.

## 2019-09-25 NOTE — TELEPHONE ENCOUNTER
----- Message from Marcia Hussein sent at 9/25/2019  3:06 PM CDT -----  Contact: [phone]/self  Patient is requesting a call back regarding having his paper work completed. Please call him when it is ready for . Thanks

## 2019-11-20 ENCOUNTER — OFFICE VISIT (OUTPATIENT)
Dept: PSYCHIATRY | Facility: CLINIC | Age: 39
End: 2019-11-20
Payer: COMMERCIAL

## 2019-11-20 VITALS
BODY MASS INDEX: 24.16 KG/M2 | WEIGHT: 163.56 LBS | HEART RATE: 83 BPM | DIASTOLIC BLOOD PRESSURE: 72 MMHG | SYSTOLIC BLOOD PRESSURE: 118 MMHG

## 2019-11-20 DIAGNOSIS — Z86.79 HISTORY OF PALPITATIONS IN ADULTHOOD: ICD-10-CM

## 2019-11-20 DIAGNOSIS — I73.00 RAYNAUD'S DISEASE WITHOUT GANGRENE: ICD-10-CM

## 2019-11-20 DIAGNOSIS — F90.0 ATTENTION DEFICIT HYPERACTIVITY DISORDER (ADHD), PREDOMINANTLY INATTENTIVE TYPE: Primary | ICD-10-CM

## 2019-11-20 PROCEDURE — 99999 PR PBB SHADOW E&M-EST. PATIENT-LVL III: CPT | Mod: PBBFAC,,, | Performed by: PSYCHIATRY & NEUROLOGY

## 2019-11-20 PROCEDURE — 3008F PR BODY MASS INDEX (BMI) DOCUMENTED: ICD-10-PCS | Mod: CPTII,S$GLB,, | Performed by: PSYCHIATRY & NEUROLOGY

## 2019-11-20 PROCEDURE — 99214 OFFICE O/P EST MOD 30 MIN: CPT | Mod: S$GLB,,, | Performed by: PSYCHIATRY & NEUROLOGY

## 2019-11-20 PROCEDURE — 99214 PR OFFICE/OUTPT VISIT, EST, LEVL IV, 30-39 MIN: ICD-10-PCS | Mod: S$GLB,,, | Performed by: PSYCHIATRY & NEUROLOGY

## 2019-11-20 PROCEDURE — 3008F BODY MASS INDEX DOCD: CPT | Mod: CPTII,S$GLB,, | Performed by: PSYCHIATRY & NEUROLOGY

## 2019-11-20 PROCEDURE — 99999 PR PBB SHADOW E&M-EST. PATIENT-LVL III: ICD-10-PCS | Mod: PBBFAC,,, | Performed by: PSYCHIATRY & NEUROLOGY

## 2019-11-20 RX ORDER — DEXTROAMPHETAMINE SACCHARATE, AMPHETAMINE ASPARTATE, DEXTROAMPHETAMINE SULFATE AND AMPHETAMINE SULFATE 5; 5; 5; 5 MG/1; MG/1; MG/1; MG/1
1 TABLET ORAL 2 TIMES DAILY
Qty: 60 TABLET | Refills: 0 | Status: SHIPPED | OUTPATIENT
Start: 2019-11-20 | End: 2019-11-20 | Stop reason: SDUPTHER

## 2019-11-20 RX ORDER — DEXTROAMPHETAMINE SACCHARATE, AMPHETAMINE ASPARTATE, DEXTROAMPHETAMINE SULFATE AND AMPHETAMINE SULFATE 5; 5; 5; 5 MG/1; MG/1; MG/1; MG/1
1 TABLET ORAL 2 TIMES DAILY
Qty: 60 TABLET | Refills: 0 | Status: SHIPPED | OUTPATIENT
Start: 2020-01-20 | End: 2020-02-17 | Stop reason: SDUPTHER

## 2019-11-20 RX ORDER — DEXTROAMPHETAMINE SACCHARATE, AMPHETAMINE ASPARTATE, DEXTROAMPHETAMINE SULFATE AND AMPHETAMINE SULFATE 5; 5; 5; 5 MG/1; MG/1; MG/1; MG/1
1 TABLET ORAL 2 TIMES DAILY
Qty: 60 TABLET | Refills: 0 | Status: SHIPPED | OUTPATIENT
Start: 2019-12-20 | End: 2019-11-20 | Stop reason: SDUPTHER

## 2019-11-20 NOTE — PROGRESS NOTES
ESTABLISHED OUTPATIENT VISIT   E/M LEVEL 4: [id number]    ENCOUNTER DATE: 11/20/2019  SITE: [facility], [facility]      HISTORY    CHIEF COMPLAINT   Juan Manuel Hdz is a 38 y.o. male who presents for followup of   Chief Complaint   Patient presents with    Inattention    Distractibilty       HPI   (Elements: Location, Quality, Severity, Duration, Timing, Content, Modifying Factors, Associated Signs & Symptoms)    Patient with history of ADHD, here for disease maintenance.  He is here for follow up appointment.  No issues with his adderall.  If he doesn't take it for some time (like if he is off work), he notes one night of insomnia but otherwise no issues.  He states in general his sleep is intact.  His weight is down another 7lbs - denies loss of appetite on adderall - states he and wife are eating more healthy.  No cardiac issues - no palpitations - this was issue in distant past but not now.  No tremor noted.  No depression, anxiety, SI, paranoia.  Raynaud's doing well - no issues.  Son is now 9 months old - doing well.  Marriage happy, wife is in school - studying biology, considering medical school.  Work is fine - no issues.    8/21/19 - stable  4/25/19 - stable, no med changes  8/30/18 - continues to report reduced efficacy on current dose.  Dose titrated to adderall 20mg bid.  2/8/18 - partial effect with 20mg of adderall - taking 30 or even 40mg some days, dose continued at 10mg bid  12/7/16 - off adderall for some time, worsening procrastination/distraction at work, adderall restarted 10mg bid  10/8/15 - stable, no med changes  7/10/15 - seen by resident, stable, no med changes  9/25/14 - stable, no med changes    ROS   Cardiovascular ROS: negative for - chest pain/palpitations    Neuro ROS: no tremor        PFSH  Past Medical History reviewed: Yes  Family History reviewed: Yes  Social History reviewed: No      PSYCHOTROPIC MEDICATIONS   Adderall 20mg bid       EXAMINATION    VITALS  "  Most recent vital signs, dated less than 90 days prior to this appointment, were reviewed.   Vitals:    11/20/19 0918   BP: 118/72   Pulse: 83       CONSTITUTIONAL  General Appearance: stated age, casually dressed    MUSCULOSKELETAL  Muscle Strength and Tone: no weakness or spasticity  Abnormal Involuntary Movements: no tremor or tic noted  Gait and Station: ambulates without assistance    PSYCHIATRIC   Level of Consciousness: alert  Orientation: to person, place, time  Grooming: well groomed  Psychomotor Behavior: no retardation or agitation  Speech: conversational, decreased spontaneity, answers questions succinctly  Language: fluent english, repeats words/phrases, mild lisp  Mood: "good"  Affect: euthymic, pleasant  Thought Process: linear  Associations: intact, no loosening of associations  Thought Content: no SI  Memory: intact  Attention: +distractibility noted  Fund of Knowledge: intact  Insight: intact  Judgment: intact    MEDICAL DECISION MAKING    DIAGNOSES  ADHD    PROBLEM LIST AND MANAGEMENT PLANS    New problem(s) (3 points each):   - none    Established problem(s), worsening (2 points each):   - none    Established problem(s), stable/improved (1 point each):  - ADHD.  Continue adderall as prescribed.  [place] monitoring [place] - no problems noted  - palpitations.  An issue in fall 2013 but not currently.  Cardiology cleared him to take stimulants.  - work/relationship problems.  Currently no major issues.  Supportive psychotherapy provided during session concerning role adjustments (, father).  - Raynaud's.  Follow with MD.  No current issues.        PRESCRIPTION DRUG MANAGEMENT  Compliance: currently taking meds as prescribed  Side Effects: none   Regimen Adjustments: cont Adderall 20mg bid      DIAGNOSTIC TESTING  EKG 11/13/13 - QTc 397  2/14/19 - CMP, CBC reviewed  2/11/19 - lipid panel reviewed      David Kelly    "

## 2020-02-20 ENCOUNTER — OFFICE VISIT (OUTPATIENT)
Dept: PSYCHIATRY | Facility: CLINIC | Age: 40
End: 2020-02-20
Payer: COMMERCIAL

## 2020-02-20 VITALS
SYSTOLIC BLOOD PRESSURE: 129 MMHG | BODY MASS INDEX: 24.57 KG/M2 | WEIGHT: 165.88 LBS | DIASTOLIC BLOOD PRESSURE: 80 MMHG | HEART RATE: 72 BPM | HEIGHT: 69 IN

## 2020-02-20 DIAGNOSIS — Z86.79 HISTORY OF PALPITATIONS IN ADULTHOOD: ICD-10-CM

## 2020-02-20 DIAGNOSIS — I73.00 RAYNAUD'S DISEASE WITHOUT GANGRENE: ICD-10-CM

## 2020-02-20 DIAGNOSIS — J45.909 ASTHMA, CURRENTLY ACTIVE: ICD-10-CM

## 2020-02-20 DIAGNOSIS — F90.0 ATTENTION DEFICIT HYPERACTIVITY DISORDER (ADHD), PREDOMINANTLY INATTENTIVE TYPE: Primary | ICD-10-CM

## 2020-02-20 PROCEDURE — 99999 PR PBB SHADOW E&M-EST. PATIENT-LVL III: ICD-10-PCS | Mod: PBBFAC,,, | Performed by: PSYCHIATRY & NEUROLOGY

## 2020-02-20 PROCEDURE — 99214 PR OFFICE/OUTPT VISIT, EST, LEVL IV, 30-39 MIN: ICD-10-PCS | Mod: S$GLB,,, | Performed by: PSYCHIATRY & NEUROLOGY

## 2020-02-20 PROCEDURE — 3008F BODY MASS INDEX DOCD: CPT | Mod: CPTII,S$GLB,, | Performed by: PSYCHIATRY & NEUROLOGY

## 2020-02-20 PROCEDURE — 99999 PR PBB SHADOW E&M-EST. PATIENT-LVL III: CPT | Mod: PBBFAC,,, | Performed by: PSYCHIATRY & NEUROLOGY

## 2020-02-20 PROCEDURE — 99214 OFFICE O/P EST MOD 30 MIN: CPT | Mod: S$GLB,,, | Performed by: PSYCHIATRY & NEUROLOGY

## 2020-02-20 PROCEDURE — 3008F PR BODY MASS INDEX (BMI) DOCUMENTED: ICD-10-PCS | Mod: CPTII,S$GLB,, | Performed by: PSYCHIATRY & NEUROLOGY

## 2020-02-20 RX ORDER — DEXTROAMPHETAMINE SACCHARATE, AMPHETAMINE ASPARTATE, DEXTROAMPHETAMINE SULFATE AND AMPHETAMINE SULFATE 5; 5; 5; 5 MG/1; MG/1; MG/1; MG/1
1 TABLET ORAL 2 TIMES DAILY
Qty: 60 TABLET | Refills: 0 | Status: SHIPPED | OUTPATIENT
Start: 2020-03-20 | End: 2020-02-20 | Stop reason: SDUPTHER

## 2020-02-20 RX ORDER — DEXTROAMPHETAMINE SACCHARATE, AMPHETAMINE ASPARTATE, DEXTROAMPHETAMINE SULFATE AND AMPHETAMINE SULFATE 5; 5; 5; 5 MG/1; MG/1; MG/1; MG/1
1 TABLET ORAL 2 TIMES DAILY
Qty: 60 TABLET | Refills: 0 | Status: SHIPPED | OUTPATIENT
Start: 2020-04-20 | End: 2020-05-18 | Stop reason: SDUPTHER

## 2020-02-20 RX ORDER — DEXTROAMPHETAMINE SACCHARATE, AMPHETAMINE ASPARTATE, DEXTROAMPHETAMINE SULFATE AND AMPHETAMINE SULFATE 5; 5; 5; 5 MG/1; MG/1; MG/1; MG/1
1 TABLET ORAL 2 TIMES DAILY
Qty: 60 TABLET | Refills: 0 | Status: SHIPPED | OUTPATIENT
Start: 2020-02-20 | End: 2020-02-20 | Stop reason: SDUPTHER

## 2020-02-20 NOTE — PROGRESS NOTES
ESTABLISHED OUTPATIENT VISIT   E/M LEVEL 4: [id number]    ENCOUNTER DATE: 2/20/2020  SITE: [facility], [facility]      HISTORY    CHIEF COMPLAINT   Juan Manuel Hdz is a 39 y.o. male who presents for followup of   Chief Complaint   Patient presents with    Inattention    Distractibilty       HPI   (Elements: Location, Quality, Severity, Duration, Timing, Content, Modifying Factors, Associated Signs & Symptoms)    Patient with history of ADHD, here for disease maintenance.  He is here for follow up appointment.  No issues with his adderall.  If he doesn't take it for some time (like over weekend or vacation), he notes one or two nights of insomnia but otherwise no issues.  He states in general his sleep is intact.  His weight is stable intersession - up 2lbs.  No cardiac issues - no palpitations - this was issue in distant past but not now.  No tremor noted.  No depression, anxiety though does note some mild mood fluctuations - states it is manageable.  Also some procrastination to do chores.  Raynaud's difficult during the winter.  Son is now a year old - doing well - here with him tonight.  Marriage happy, work is fine - no issues.      11/20/19 - stable  8/21/19 - stable  4/25/19 - stable, no med changes  8/30/18 - continues to report reduced efficacy on current dose.  Dose titrated to adderall 20mg bid.  2/8/18 - partial effect with 20mg of adderall - taking 30 or even 40mg some days, dose continued at 10mg bid  12/7/16 - off adderall for some time, worsening procrastination/distraction at work, adderall restarted 10mg bid  10/8/15 - stable, no med changes  7/10/15 - seen by resident, stable, no med changes  9/25/14 - stable, no med changes    ROS   Cardiovascular ROS: negative for - chest pain/palpitations    Neuro ROS: no tremor        PFSH  Past Medical History reviewed: Yes  Family History reviewed: Yes  Social History reviewed: No      PSYCHOTROPIC MEDICATIONS   Adderall 20mg bid  "      EXAMINATION    VITALS   Most recent vital signs, dated less than 90 days prior to this appointment, were reviewed.   Vitals:    02/20/20 1610   BP: 129/80   Pulse: 72       CONSTITUTIONAL  General Appearance: stated age, casually dressed    MUSCULOSKELETAL  Muscle Strength and Tone: no weakness or spasticity  Abnormal Involuntary Movements: no tremor or tic noted  Gait and Station: ambulates without assistance    PSYCHIATRIC   Level of Consciousness: alert  Orientation: to person, place, time  Grooming: well groomed  Psychomotor Behavior: no retardation or agitation  Speech: conversational, spontaneous  Language: fluent english, repeats words/phrases, mild lisp  Mood: "fine"  Affect: euthymic, pleasant  Thought Process: linear  Associations: intact, no loosening of associations  Thought Content: no SI  Memory: intact  Attention: +distractibility noted  Fund of Knowledge: intact  Insight: intact  Judgment: intact    MEDICAL DECISION MAKING    DIAGNOSES  ADHD    PROBLEM LIST AND MANAGEMENT PLANS    New problem(s) (3 points each):   - none    Established problem(s), worsening (2 points each):   - none    Established problem(s), stable/improved (1 point each):  - ADHD.  Continue adderall as prescribed.  [place] monitoring [place] - no problems noted  - palpitations.  An issue in fall 2013 but not currently.  Cardiology cleared him to take stimulants.  - work/relationship problems.  Currently no major issues.  Supportive psychotherapy provided during session.  - Raynaud's.  Follow with MD.         PRESCRIPTION DRUG MANAGEMENT  Compliance: currently taking meds as prescribed  Side Effects: none   Regimen Adjustments: cont Adderall 20mg bid      DIAGNOSTIC TESTING  EKG 11/13/13 - QTc 397  2/14/19 - CMP, CBC reviewed  2/11/19 - lipid panel reviewed      David Kelly  "

## 2020-05-20 ENCOUNTER — OFFICE VISIT (OUTPATIENT)
Dept: PSYCHIATRY | Facility: CLINIC | Age: 40
End: 2020-05-20
Payer: COMMERCIAL

## 2020-05-20 VITALS — RESPIRATION RATE: 15 BRPM | HEIGHT: 69 IN | BODY MASS INDEX: 24.5 KG/M2

## 2020-05-20 DIAGNOSIS — I73.00 RAYNAUD'S DISEASE WITHOUT GANGRENE: ICD-10-CM

## 2020-05-20 DIAGNOSIS — F90.0 ATTENTION DEFICIT HYPERACTIVITY DISORDER (ADHD), PREDOMINANTLY INATTENTIVE TYPE: Primary | ICD-10-CM

## 2020-05-20 DIAGNOSIS — Z86.79 HISTORY OF PALPITATIONS IN ADULTHOOD: ICD-10-CM

## 2020-05-20 PROCEDURE — 99213 OFFICE O/P EST LOW 20 MIN: CPT | Mod: 95,,, | Performed by: PSYCHIATRY & NEUROLOGY

## 2020-05-20 PROCEDURE — 3008F BODY MASS INDEX DOCD: CPT | Mod: CPTII,,, | Performed by: PSYCHIATRY & NEUROLOGY

## 2020-05-20 PROCEDURE — 99213 PR OFFICE/OUTPT VISIT, EST, LEVL III, 20-29 MIN: ICD-10-PCS | Mod: 95,,, | Performed by: PSYCHIATRY & NEUROLOGY

## 2020-05-20 PROCEDURE — 3008F PR BODY MASS INDEX (BMI) DOCUMENTED: ICD-10-PCS | Mod: CPTII,,, | Performed by: PSYCHIATRY & NEUROLOGY

## 2020-05-20 RX ORDER — DEXTROAMPHETAMINE SACCHARATE, AMPHETAMINE ASPARTATE, DEXTROAMPHETAMINE SULFATE AND AMPHETAMINE SULFATE 5; 5; 5; 5 MG/1; MG/1; MG/1; MG/1
1 TABLET ORAL 2 TIMES DAILY
Qty: 60 TABLET | Refills: 0 | Status: SHIPPED | OUTPATIENT
Start: 2020-05-20 | End: 2020-06-24 | Stop reason: SDUPTHER

## 2020-05-20 NOTE — PROGRESS NOTES
The patient location is: home in [place]  The chief complaint leading to consultation is: inattention and distractibility    Visit type: audiovisual    Face to Face time with patient: 12 minutes  25 minutes of total time spent on the encounter, which includes face to face time and non-face to face time preparing to see the patient (eg, review of tests), Obtaining and/or reviewing separately obtained history, Documenting clinical information in the electronic or other health record, Independently interpreting results (not separately reported) and communicating results to the patient/family/caregiver, or Care coordination (not separately reported).         Each patient to whom he or she provides medical services by telemedicine is:  (1) informed of the relationship between the physician and patient and the respective role of any other health care provider with respect to management of the patient; and (2) notified that he or she may decline to receive medical services by telemedicine and may withdraw from such care at any time.        ESTABLISHED OUTPATIENT VISIT   E/M LEVEL 3: [id number]    ENCOUNTER DATE: 5/20/2020  SITE: [facility], [address]      HISTORY    CHIEF COMPLAINT   Juan Manuel Hdz is a 39 y.o. male who presents for followup of   Chief Complaint   Patient presents with    Inattention    Distractibilty       HPI   (Elements: Location, Quality, Severity, Duration, Timing, Content, Modifying Factors, Associated Signs & Symptoms)    Patient with history of ADHD, here for disease maintenance.  He is seen today for follow up appointment.  No issues with his adderall.  He does note the last several weeks it takes longer for onset.  Sleep intact.  Appetite and weight intact - 163lbs.  No depression or excessive anxiety.  No tremor or palpitations.  He is handling COVID well - enjoys working from home.  Son and wife are doing well.  No new issues.      2/29/29 - stable  11/20/19 -  "stable  8/21/19 - stable  4/25/19 - stable, no med changes  8/30/18 - continues to report reduced efficacy on current dose.  Dose titrated to adderall 20mg bid.  2/8/18 - partial effect with 20mg of adderall - taking 30 or even 40mg some days, dose continued at 10mg bid  12/7/16 - off adderall for some time, worsening procrastination/distraction at work, adderall restarted 10mg bid  10/8/15 - stable, no med changes  7/10/15 - seen by resident, stable, no med changes  9/25/14 - stable, no med changes      ROS   Cardiovascular ROS: negative for - chest pain/palpitations    Neuro ROS: no tremor        PFSH  Past Medical History reviewed: Yes  Family History reviewed: Yes  Social History reviewed: No      PSYCHOTROPIC MEDICATIONS   Adderall 20mg bid       EXAMINATION    VITALS   Most recent vital signs, dated less than 90 days prior to this appointment, were reviewed.   Vitals:    05/20/20 1603   Resp: 15       CONSTITUTIONAL  General Appearance: stated age, casually dressed    MUSCULOSKELETAL  Muscle Strength and Tone: no weakness or spasticity  Abnormal Involuntary Movements: no tremor or tic noted  Gait and Station: ambulates without assistance - not observed today    PSYCHIATRIC   Level of Consciousness: alert  Orientation: to person, place, time  Grooming: adequately groomed  Psychomotor Behavior: no retardation or agitation  Speech: conversational, spontaneous  Language: fluent english, repeats words/phrases, mild lisp  Mood: "fine"  Affect: euthymic, pleasant  Thought Process: linear  Associations: intact, no loosening of associations  Thought Content: no SI  Memory: intact  Attention: +distractibility noted  Fund of Knowledge: intact  Insight: intact  Judgment: intact    MEDICAL DECISION MAKING    DIAGNOSES  ADHD    PROBLEM LIST AND MANAGEMENT PLANS    New problem(s) (3 points each):   - none    Established problem(s), worsening (2 points each):   - none    Established problem(s), stable/improved (1 point " each):  - ADHD.  Continue adderall as prescribed.  [place] monitoring [place] - no problems noted  - palpitations.  An issue in fall 2013 but not currently.  Cardiology cleared him to take stimulants.  - work/relationship issues.  Currently no major issues.  Brief supportive psychotherapy provided during session.  - Raynaud's.  Follow with MD.         PRESCRIPTION DRUG MANAGEMENT  Compliance: currently taking meds as prescribed  Side Effects: none   Regimen Adjustments: cont Adderall 20mg bid      DIAGNOSTIC TESTING  EKG 11/13/13 - QTc 397  2/14/19 - CMP, CBC reviewed  2/11/19 - lipid panel reviewed      David Kelly

## 2020-06-24 DIAGNOSIS — F90.0 ATTENTION DEFICIT HYPERACTIVITY DISORDER (ADHD), PREDOMINANTLY INATTENTIVE TYPE: ICD-10-CM

## 2020-06-24 RX ORDER — DEXTROAMPHETAMINE SACCHARATE, AMPHETAMINE ASPARTATE, DEXTROAMPHETAMINE SULFATE AND AMPHETAMINE SULFATE 5; 5; 5; 5 MG/1; MG/1; MG/1; MG/1
1 TABLET ORAL 2 TIMES DAILY
Qty: 60 TABLET | Refills: 0 | Status: SHIPPED | OUTPATIENT
Start: 2020-06-24 | End: 2020-07-29 | Stop reason: SDUPTHER

## 2020-07-26 ENCOUNTER — OFFICE VISIT (OUTPATIENT)
Dept: URGENT CARE | Facility: CLINIC | Age: 40
End: 2020-07-26
Payer: COMMERCIAL

## 2020-07-26 DIAGNOSIS — R06.02 SHORTNESS OF BREATH: ICD-10-CM

## 2020-07-26 DIAGNOSIS — R68.83 CHILLS: ICD-10-CM

## 2020-07-26 DIAGNOSIS — R51.9 ACUTE NONINTRACTABLE HEADACHE, UNSPECIFIED HEADACHE TYPE: ICD-10-CM

## 2020-07-26 DIAGNOSIS — J06.9 VIRAL URI WITH COUGH: Primary | ICD-10-CM

## 2020-07-26 DIAGNOSIS — R05.9 COUGH: ICD-10-CM

## 2020-07-26 LAB
CTP QC/QA: YES
MOLECULAR STREP A: NEGATIVE

## 2020-07-26 PROCEDURE — 99214 PR OFFICE/OUTPT VISIT, EST, LEVL IV, 30-39 MIN: ICD-10-PCS | Mod: S$GLB,,, | Performed by: PHYSICIAN ASSISTANT

## 2020-07-26 PROCEDURE — 87651 STREP A DNA AMP PROBE: CPT | Mod: QW,S$GLB,, | Performed by: PHYSICIAN ASSISTANT

## 2020-07-26 PROCEDURE — U0003 INFECTIOUS AGENT DETECTION BY NUCLEIC ACID (DNA OR RNA); SEVERE ACUTE RESPIRATORY SYNDROME CORONAVIRUS 2 (SARS-COV-2) (CORONAVIRUS DISEASE [COVID-19]), AMPLIFIED PROBE TECHNIQUE, MAKING USE OF HIGH THROUGHPUT TECHNOLOGIES AS DESCRIBED BY CMS-2020-01-R: HCPCS

## 2020-07-26 PROCEDURE — 87651 POCT STREP A MOLECULAR: ICD-10-PCS | Mod: QW,S$GLB,, | Performed by: PHYSICIAN ASSISTANT

## 2020-07-26 PROCEDURE — 99214 OFFICE O/P EST MOD 30 MIN: CPT | Mod: S$GLB,,, | Performed by: PHYSICIAN ASSISTANT

## 2020-07-26 NOTE — PROGRESS NOTES
"Subjective:       Patient ID: Juan Manuel Hdz is a 39 y.o. male.    Vitals:  height is 5' 9" (1.753 m) and weight is 74.8 kg (165 lb). His temperature is 98 °F (36.7 °C). His blood pressure is 116/72 and his pulse is 101. His respiration is 14 and oxygen saturation is 98%.     Chief Complaint: COVID-19 Concerns    This is a 39-year-old male who presents complaining of 2 days of sinus congestion, mild sore throat, cough, generalized fatigue.  Denies any fevers, shortness of breath, chest pain, difficulty breathing.  Wife and son are also sick at home and present in clinic.    URI   This is a new problem. The current episode started in the past 7 days (2 days ). The problem has been unchanged. There has been no fever. Associated symptoms include congestion, coughing, headaches, rhinorrhea and a sore throat. Pertinent negatives include no chest pain, ear pain, nausea, rash, sinus pain, vomiting or wheezing. He has tried nothing for the symptoms.       Constitution: Negative for chills, sweating, fatigue and fever.   HENT: Positive for congestion and sore throat. Negative for ear pain, sinus pain, sinus pressure and voice change.    Neck: Negative for painful lymph nodes.   Cardiovascular: Negative for chest pain.   Eyes: Negative for eye redness.   Respiratory: Positive for cough. Negative for chest tightness, sputum production, bloody sputum, COPD, shortness of breath, stridor, wheezing and asthma.    Gastrointestinal: Negative for nausea and vomiting.   Musculoskeletal: Positive for muscle ache.   Skin: Negative for rash.   Allergic/Immunologic: Negative for seasonal allergies and asthma.   Neurological: Positive for headaches.   Hematologic/Lymphatic: Negative for swollen lymph nodes.       Objective:      Physical Exam   Constitutional: He is oriented to person, place, and time. He appears well-developed. He is cooperative.  Non-toxic appearance. He does not appear ill. No distress.      Comments:Appears very " well, no acute distress, not toxic or ill appearing   HENT:   Head: Normocephalic and atraumatic.   Ears:   Right Ear: Hearing, tympanic membrane, external ear and ear canal normal.   Left Ear: Hearing, tympanic membrane, external ear and ear canal normal.   Nose: Rhinorrhea present. No mucosal edema or nasal deformity. No epistaxis. Right sinus exhibits no maxillary sinus tenderness and no frontal sinus tenderness. Left sinus exhibits no maxillary sinus tenderness and no frontal sinus tenderness.   Mouth/Throat: Uvula is midline and mucous membranes are normal. No trismus in the jaw. Normal dentition. No uvula swelling. Posterior oropharyngeal erythema (Mild) and cobblestoning present. No oropharyngeal exudate, posterior oropharyngeal edema or tonsillar abscesses. Tonsils are 1+ on the right. Tonsils are 1+ on the left. No tonsillar exudate.   Eyes: Conjunctivae and lids are normal. No scleral icterus.   Neck: Trachea normal, full passive range of motion without pain and phonation normal. Neck supple. No neck rigidity. No edema and no erythema present.   Cardiovascular: Normal rate, regular rhythm, normal heart sounds and normal pulses.   Pulmonary/Chest: Effort normal and breath sounds normal. No respiratory distress. He has no decreased breath sounds. He has no rhonchi.   Abdominal: Normal appearance.   Musculoskeletal: Normal range of motion.         General: No deformity.   Neurological: He is alert and oriented to person, place, and time. He exhibits normal muscle tone. Coordination normal.   Skin: Skin is warm, dry, intact, not diaphoretic and not pale. Psychiatric: His speech is normal and behavior is normal. Judgment and thought content normal.   Nursing note and vitals reviewed.    Results for orders placed or performed in visit on 07/26/20   POCT Strep A, Molecular   Result Value Ref Range    Molecular Strep A, POC Negative Negative     Acceptable Yes            Assessment:       1. Viral  URI with cough    2. Shortness of breath    3. Cough    4. Chills    5. Acute nonintractable headache, unspecified headache type        Plan:       Patient with new onset URI symptoms, other individuals sick at home, likely viral URI.  Will swab for COVID.  Strep negative.  Discussed at home care, emergency room precautions, return precautions. [facility] protocol for quarantine discussed at length. Quarantine until receiving results    All questions answered.    Viral URI with cough    Shortness of breath  -     COVID-19 Routine Screening    Cough  -     COVID-19 Routine Screening    Chills  -     COVID-19 Routine Screening    Acute nonintractable headache, unspecified headache type  -     COVID-19 Routine Screening  -     POCT Strep A, Molecular    Labs reviewed, pertinent imaging reviewed, previous medical records, medical history, surgical history, social history, family history reviewed.       Patient Instructions   We will call you with results  Fluids  Rest  Tylenol for any fever  Quarantine yourself until you receive results  If you have difficulty breathing or shortness of breath please go to the emergency department    Please arrange follow up with your primary medical clinic as soon as possible. You must understand that you've received an Urgent Care treatment only and that you may be released before all of your medical problems are known or treated. You, the patient, will arrange for follow up as instructed. If your symptoms worsen or fail to improve you should go to the Emergency Room.  WE CANNOT RULE OUT ALL POSSIBLE CAUSES OF YOUR SYMPTOMS IN THE URGENT CARE SETTING PLEASE GO TO THE ER IF YOU FEELS YOUR CONDITION IS WORSENING OR YOU WOULD LIKE EMERGENT EVALUATION.    Please drink plenty of fluids.  Please get plenty of rest.  Please return here or go to the Emergency Department for any concerns or worsening of condition.  If you were given wait & see antibiotics, please wait 3-5 days before taking them, and  only take them if your symptoms have worsened or not improved.  If you do begin taking the antibiotics, please take them to completion.  If you were prescribed antibiotics, please take them to completion.  If you were prescribed a narcotic medication, do not drive or operate heavy equipment or machinery while taking these medications.  If you do not have Hypertension or any history of palpitations, it is ok to take over the counter Sudafed or Mucinex D or Allegra-D or Claritin-D or Zyrtec-D.  If you do take one of the above, it is ok to combine that with plain over the counter Mucinex or Allegra or Claritin or Zyrtec.  If for example you are taking Zyrtec -D, you can combine that with Mucinex, but not Mucinex-D.  If you are taking Mucinex-D, you can combine that with plain Allegra or Claritin or Zyrtec.   If you do have Hypertension or palpitations, it is safe to take Coricidin HBP for relief of sinus symptoms.  We recommend you take over the counter Flonase (Fluticasone) or another nasally inhaled steroid unless you are already taking one.  Nasal irrigation with a saline spray or Netti Pot like device per their directions is also recommended.  If not allergic, please take over the counter Tylenol (Acetaminophen) and/or Motrin (Ibuprofen) as directed for control of pain and/or fever.  Please follow up with your primary care doctor or specialist as needed.    If you  smoke, please stop smoking.      Viral Upper Respiratory Illness (Adult)  You have a viral upper respiratory illness (URI), which is another term for the common cold. This illness is contagious during the first few days. It is spread through the air by coughing and sneezing. It may also be spread by direct contact (touching the sick person and then touching your own eyes, nose, or mouth). Frequent handwashing will decrease risk of spread. Most viral illnesses go away within 7 to 10 days with rest and simple home remedies. Sometimes the illness may last  for several weeks. Antibiotics will not kill a virus, and they are generally not prescribed for this condition.    Home care  · If symptoms are severe, rest at home for the first 2 to 3 days. When you resume activity, don't let yourself get too tired.  · Avoid being exposed to cigarette smoke (yours or others).  · You may use acetaminophen or ibuprofen to control pain and fever, unless another medicine was prescribed. (Note: If you have chronic liver or kidney disease, have ever had a stomach ulcer or gastrointestinal bleeding, or are taking blood-thinning medicines, talk with your healthcare provider before using these medicines.) Aspirin should never be given to anyone under 18 years of age who is ill with a viral infection or fever. It may cause severe liver or brain damage.  · Your appetite may be poor, so a light diet is fine. Avoid dehydration by drinking 6 to 8 glasses of fluids per day (water, soft drinks, juices, tea, or soup). Extra fluids will help loosen secretions in the nose and lungs.  · Over-the-counter cold medicines will not shorten the length of time youre sick, but they may be helpful for the following symptoms: cough, sore throat, and nasal and sinus congestion. (Note: Do not use decongestants if you have high blood pressure.)  Follow-up care  Follow up with your healthcare provider, or as advised.  When to seek medical advice  Call your healthcare provider right away if any of these occur:  · Cough with lots of colored sputum (mucus)  · Severe headache; face, neck, or ear pain  · Difficulty swallowing due to throat pain  · Fever of 100.4°F (38°C)  Call 911, or get immediate medical care  Call emergency services right away if any of these occur:  · Chest pain, shortness of breath, wheezing, or difficulty breathing  · Coughing up blood  · Inability to swallow due to throat pain  Date Last Reviewed: 9/13/2015  © 0420-3603 [employer]. [address] [place] [address], [place] [address]. All  rights reserved. This information is not intended as a substitute for professional medical care. Always follow your healthcare professional's instructions.

## 2020-07-26 NOTE — PATIENT INSTRUCTIONS
We will call you with results  Fluids  Rest  Tylenol for any fever  Quarantine yourself until you receive results  If you have difficulty breathing or shortness of breath please go to the emergency department    Please arrange follow up with your primary medical clinic as soon as possible. You must understand that you've received an Urgent Care treatment only and that you may be released before all of your medical problems are known or treated. You, the patient, will arrange for follow up as instructed. If your symptoms worsen or fail to improve you should go to the Emergency Room.  WE CANNOT RULE OUT ALL POSSIBLE CAUSES OF YOUR SYMPTOMS IN THE URGENT CARE SETTING PLEASE GO TO THE ER IF YOU FEELS YOUR CONDITION IS WORSENING OR YOU WOULD LIKE EMERGENT EVALUATION.    Please drink plenty of fluids.  Please get plenty of rest.  Please return here or go to the Emergency Department for any concerns or worsening of condition.  If you were given wait & see antibiotics, please wait 3-5 days before taking them, and only take them if your symptoms have worsened or not improved.  If you do begin taking the antibiotics, please take them to completion.  If you were prescribed antibiotics, please take them to completion.  If you were prescribed a narcotic medication, do not drive or operate heavy equipment or machinery while taking these medications.  If you do not have Hypertension or any history of palpitations, it is ok to take over the counter Sudafed or Mucinex D or Allegra-D or Claritin-D or Zyrtec-D.  If you do take one of the above, it is ok to combine that with plain over the counter Mucinex or Allegra or Claritin or Zyrtec.  If for example you are taking Zyrtec -D, you can combine that with Mucinex, but not Mucinex-D.  If you are taking Mucinex-D, you can combine that with plain Allegra or Claritin or Zyrtec.   If you do have Hypertension or palpitations, it is safe to take Coricidin HBP for relief of sinus symptoms.  We  recommend you take over the counter Flonase (Fluticasone) or another nasally inhaled steroid unless you are already taking one.  Nasal irrigation with a saline spray or Netti Pot like device per their directions is also recommended.  If not allergic, please take over the counter Tylenol (Acetaminophen) and/or Motrin (Ibuprofen) as directed for control of pain and/or fever.  Please follow up with your primary care doctor or specialist as needed.    If you  smoke, please stop smoking.      Viral Upper Respiratory Illness (Adult)  You have a viral upper respiratory illness (URI), which is another term for the common cold. This illness is contagious during the first few days. It is spread through the air by coughing and sneezing. It may also be spread by direct contact (touching the sick person and then touching your own eyes, nose, or mouth). Frequent handwashing will decrease risk of spread. Most viral illnesses go away within 7 to 10 days with rest and simple home remedies. Sometimes the illness may last for several weeks. Antibiotics will not kill a virus, and they are generally not prescribed for this condition.    Home care  · If symptoms are severe, rest at home for the first 2 to 3 days. When you resume activity, don't let yourself get too tired.  · Avoid being exposed to cigarette smoke (yours or others).  · You may use acetaminophen or ibuprofen to control pain and fever, unless another medicine was prescribed. (Note: If you have chronic liver or kidney disease, have ever had a stomach ulcer or gastrointestinal bleeding, or are taking blood-thinning medicines, talk with your healthcare provider before using these medicines.) Aspirin should never be given to anyone under 18 years of age who is ill with a viral infection or fever. It may cause severe liver or brain damage.  · Your appetite may be poor, so a light diet is fine. Avoid dehydration by drinking 6 to 8 glasses of fluids per day (water, soft drinks,  juices, tea, or soup). Extra fluids will help loosen secretions in the nose and lungs.  · Over-the-counter cold medicines will not shorten the length of time youre sick, but they may be helpful for the following symptoms: cough, sore throat, and nasal and sinus congestion. (Note: Do not use decongestants if you have high blood pressure.)  Follow-up care  Follow up with your healthcare provider, or as advised.  When to seek medical advice  Call your healthcare provider right away if any of these occur:  · Cough with lots of colored sputum (mucus)  · Severe headache; face, neck, or ear pain  · Difficulty swallowing due to throat pain  · Fever of 100.4°F (38°C)  Call 911, or get immediate medical care  Call emergency services right away if any of these occur:  · Chest pain, shortness of breath, wheezing, or difficulty breathing  · Coughing up blood  · Inability to swallow due to throat pain  Date Last Reviewed: 9/13/2015  © 2331-2912 [employer]. [address] [place] [address], [place] [address]. All rights reserved. This information is not intended as a substitute for professional medical care. Always follow your healthcare professional's instructions.

## 2020-07-27 VITALS
DIASTOLIC BLOOD PRESSURE: 72 MMHG | HEART RATE: 101 BPM | WEIGHT: 165 LBS | TEMPERATURE: 98 F | OXYGEN SATURATION: 98 % | HEIGHT: 69 IN | SYSTOLIC BLOOD PRESSURE: 116 MMHG | BODY MASS INDEX: 24.44 KG/M2 | RESPIRATION RATE: 14 BRPM

## 2020-07-27 LAB — SARS-COV-2 RNA RESP QL NAA+PROBE: NOT DETECTED

## 2020-09-14 ENCOUNTER — TELEPHONE (OUTPATIENT)
Dept: PSYCHIATRY | Facility: HOSPITAL | Age: 40
End: 2020-09-14

## 2020-09-14 DIAGNOSIS — F90.0 ATTENTION DEFICIT HYPERACTIVITY DISORDER (ADHD), PREDOMINANTLY INATTENTIVE TYPE: ICD-10-CM

## 2020-09-14 RX ORDER — DEXTROAMPHETAMINE SACCHARATE, AMPHETAMINE ASPARTATE, DEXTROAMPHETAMINE SULFATE AND AMPHETAMINE SULFATE 5; 5; 5; 5 MG/1; MG/1; MG/1; MG/1
1 TABLET ORAL 2 TIMES DAILY
Qty: 60 TABLET | Refills: 0 | Status: SHIPPED | OUTPATIENT
Start: 2020-09-14 | End: 2020-09-28 | Stop reason: SDUPTHER

## 2020-09-14 NOTE — TELEPHONE ENCOUNTER
----- Message from Nallely Estrada sent at 9/14/2020 11:43 AM CDT -----  Regarding: Refill  Contact: [phone]  Patient would like refill of dextroamphetamine-amphetamine (ADDERALL) 20 mg tablet sent to:    [id number] [place] [address] [address] AT [facility] [address] [phone] (Phone)  [phone] (Fax)    He has an appt with you on 10/1.

## 2020-09-23 ENCOUNTER — OFFICE VISIT (OUTPATIENT)
Dept: INTERNAL MEDICINE | Facility: CLINIC | Age: 40
End: 2020-09-23
Payer: COMMERCIAL

## 2020-09-23 ENCOUNTER — LAB VISIT (OUTPATIENT)
Dept: LAB | Facility: HOSPITAL | Age: 40
End: 2020-09-23
Attending: INTERNAL MEDICINE
Payer: COMMERCIAL

## 2020-09-23 VITALS
WEIGHT: 157.88 LBS | DIASTOLIC BLOOD PRESSURE: 70 MMHG | SYSTOLIC BLOOD PRESSURE: 122 MMHG | BODY MASS INDEX: 23.38 KG/M2 | HEIGHT: 69 IN | OXYGEN SATURATION: 99 % | HEART RATE: 67 BPM

## 2020-09-23 DIAGNOSIS — Z01.89 ROUTINE LAB DRAW: ICD-10-CM

## 2020-09-23 DIAGNOSIS — F90.0 ATTENTION DEFICIT HYPERACTIVITY DISORDER (ADHD), PREDOMINANTLY INATTENTIVE TYPE: ICD-10-CM

## 2020-09-23 DIAGNOSIS — J45.20 MILD INTERMITTENT REACTIVE AIRWAY DISEASE WITHOUT COMPLICATION: ICD-10-CM

## 2020-09-23 DIAGNOSIS — Z13.220 SCREENING CHOLESTEROL LEVEL: ICD-10-CM

## 2020-09-23 DIAGNOSIS — Z76.89 ENCOUNTER TO ESTABLISH CARE WITH NEW DOCTOR: ICD-10-CM

## 2020-09-23 DIAGNOSIS — Z23 NEED FOR INFLUENZA VACCINATION: ICD-10-CM

## 2020-09-23 DIAGNOSIS — Z00.00 ENCOUNTER FOR HEALTH MAINTENANCE EXAMINATION: Primary | ICD-10-CM

## 2020-09-23 LAB
BILIRUB UR QL STRIP: NEGATIVE
CLARITY UR REFRACT.AUTO: CLEAR
COLOR UR AUTO: YELLOW
GLUCOSE UR QL STRIP: NEGATIVE
HGB UR QL STRIP: NEGATIVE
KETONES UR QL STRIP: NEGATIVE
LEUKOCYTE ESTERASE UR QL STRIP: NEGATIVE
NITRITE UR QL STRIP: NEGATIVE
PH UR STRIP: 6 [PH] (ref 5–8)
PROT UR QL STRIP: NEGATIVE
SP GR UR STRIP: 1.02 (ref 1–1.03)
URN SPEC COLLECT METH UR: NORMAL

## 2020-09-23 PROCEDURE — 99395 PR PREVENTIVE VISIT,EST,18-39: ICD-10-PCS | Mod: S$GLB,,, | Performed by: NURSE PRACTITIONER

## 2020-09-23 PROCEDURE — 81003 URINALYSIS AUTO W/O SCOPE: CPT

## 2020-09-23 PROCEDURE — 99395 PREV VISIT EST AGE 18-39: CPT | Mod: S$GLB,,, | Performed by: NURSE PRACTITIONER

## 2020-09-23 PROCEDURE — 99999 PR PBB SHADOW E&M-EST. PATIENT-LVL III: CPT | Mod: PBBFAC,,, | Performed by: NURSE PRACTITIONER

## 2020-09-23 PROCEDURE — 99999 PR PBB SHADOW E&M-EST. PATIENT-LVL III: ICD-10-PCS | Mod: PBBFAC,,, | Performed by: NURSE PRACTITIONER

## 2020-09-23 NOTE — PROGRESS NOTES
Subjective:       Patient ID: Juan Manuel Hdz is a 39 y.o. male.    Chief Complaint: Establish Care    Pt scheduled to establish care. Saw Dr. Ogden last year however and she is listed as his PCP. Last annual done 9-17-19 by YVES Cowart.    Follwed by Psych for ADHD.    Needs biometric form filled out for his insurance for work.    Review of Systems   Constitutional: Negative for activity change, appetite change and unexpected weight change.   HENT: Negative for hearing loss and voice change.    Eyes: Negative for visual disturbance.   Respiratory: Negative for apnea, cough, chest tightness and shortness of breath.    Cardiovascular: Negative for chest pain, palpitations and leg swelling.   Gastrointestinal: Negative for abdominal distention, abdominal pain, blood in stool, constipation, diarrhea, nausea and vomiting.   Endocrine: Negative for cold intolerance, heat intolerance, polydipsia, polyphagia and polyuria.   Genitourinary: Negative for difficulty urinating, dysuria and penile pain.   Musculoskeletal: Negative for arthralgias and myalgias.   Integumentary:  Negative for color change, pallor, rash and wound.   Allergic/Immunologic: Negative for environmental allergies and food allergies.   Neurological: Negative for dizziness and weakness.   Hematological: Negative for adenopathy. Does not bruise/bleed easily.   Psychiatric/Behavioral: Negative for agitation, behavioral problems, sleep disturbance and suicidal ideas.        Review of patient's allergies indicates:  No Known Allergies    Current Outpatient Medications:     dextroamphetamine-amphetamine (ADDERALL) 20 mg tablet, Take 1 tablet by mouth 2 (two) times daily., Disp: 60 tablet, Rfl: 0    Patient Active Problem List   Diagnosis    Attention deficit hyperactivity disorder (ADHD), predominantly inattentive type    Reactive airway disease    Asthma, currently active    AR (allergic rhinitis)    History of palpitations in adulthood     Left ankle pain    Abdominal pain    Gastroenteritis    Achilles tendon rupture    Right ankle pain    Lumbar radiculopathy    Acute appendicitis    Total bilirubin, elevated    Raynaud's disease without gangrene     Past Medical History:   Diagnosis Date    ADHD (attention deficit hyperactivity disorder)     Allergy     seasonal    AR (allergic rhinitis)     History of psychiatric care     Psychiatric problem     Reactive airway disease     Therapy      Past Surgical History:   Procedure Laterality Date    APPENDECTOMY      BACK SURGERY  2010    CLOSED REDUCTION ZYGOMATIC ARCH FRACTURE       Social History     Socioeconomic History    Marital status:      Spouse name: Not on file    Number of children: Not on file    Years of education: Not on file    Highest education level: Not on file   Occupational History    Occupation:      Employer: [employer]   Social Needs    Financial resource strain: Not on file    Food insecurity     Worry: Not on file     Inability: Not on file    Transportation needs     Medical: Not on file     Non-medical: Not on file   Tobacco Use    Smoking status: Never Smoker    Smokeless tobacco: Never Used   Substance and Sexual Activity    Alcohol use: Yes     Alcohol/week: 0.8 standard drinks     Types: 1 Standard drinks or equivalent per week     Comment: occasionally    Drug use: No    Sexual activity: Not on file   Lifestyle    Physical activity     Days per week: Not on file     Minutes per session: Not on file    Stress: Not on file   Relationships    Social connections     Talks on phone: Not on file     Gets together: Not on file     Attends Latter-day service: Not on file     Active member of club or organization: Not on file     Attends meetings of clubs or organizations: Not on file     Relationship status: Not on file   Other Topics Concern    Caffeine Use: Excessive Not Asked    Financial Status: Unemployed Not Asked     Legal: Other Not Asked    Childhood History: Adopted Not Asked    Financial Status: Other Not Asked    Leisure: Exercise Not Asked    Childhood History: Early trauma Not Asked    Firearms: Does patient have access to a firearm? Not Asked    Leisure: Fishing Not Asked    Childhood History: Raised by parents Not Asked    Home situation: homeless Not Asked    Leisure: Hunting Not Asked    Childhood History: Uneventful Not Asked    Home situation: lives alone Not Asked    Leisure: Movie Watching Not Asked    Childhood History: Other Not Asked    Home situation: lives in group home Not Asked    Leisure: Shopping Not Asked    Education: Unfinished High School Not Asked    Home situation: lives in nursing home Not Asked    Leisure: Sports Not Asked    Education: High School Graduate Not Asked    Home situation: lives in shelter Not Asked    Leisure: Time with family Not Asked    Education: Unfinished college Not Asked    Home situation: lives with family Not Asked     Service Not Asked    Education: Trade School Not Asked    Home situation: lives with friends Not Asked    Spirituality: Active Participation Not Asked    Education: Associate's Degree Not Asked    Home situation: lives with significant other Not Asked    Spirituality: Organized Jain Not Asked    Education: Bachelor's Degree Not Asked    Home situation: lives with spouse Not Asked    Spirituality: Private Participation Not Asked    Education: More than one Bachelor's or Professional Not Asked    Legal consequences of chemical use Not Asked    Patient feels they ought to cut down on drinking/drug use Not Asked    Education: Master's, PhD Not Asked    Legal: Arrest history Not Asked    Patient annoyed by others criticizing their drinking/drug use Not Asked    Financial Status: Disabled Not Asked    Legal: Involved in civil litigation Not Asked    Patient has felt bad or guilty about drinking/drug use Not  "Asked    Financial Status: Employed Not Asked    Legal: Involved in criminal litigation Not Asked    Patient has had a drink/used drugs as an eye opener in the AM Not Asked   Social History Narrative    Works at [employer] no kids     Family History   Problem Relation Age of Onset    Lupus Mother     Heart disease Father     Diabetes Maternal Grandmother     Diabetes Maternal Grandfather     ADD / ADHD Neg Hx     Alcohol abuse Neg Hx     Anxiety disorder Neg Hx     Bipolar disorder Neg Hx     Dementia Neg Hx     Depression Neg Hx     Drug abuse Neg Hx     OCD Neg Hx     Paranoid behavior Neg Hx     Physical abuse Neg Hx     Schizophrenia Neg Hx     Seizures Neg Hx     Self injury Neg Hx     Sexual abuse Neg Hx     Suicide Neg Hx     Melanoma Neg Hx            Objective:       Vitals:    09/23/20 1512   BP: 122/70   Pulse: 67   SpO2: 99%   Weight: 71.6 kg (157 lb 13.6 oz)   Height: 5' 9" (1.753 m)   PainSc: 0-No pain     Body mass index is 23.31 kg/m².    Physical Exam  Vitals signs and nursing note reviewed.   Constitutional:       Appearance: Normal appearance. He is well-developed and normal weight.   HENT:      Head: Normocephalic.      Mouth/Throat:      Mouth: Mucous membranes are moist.      Pharynx: Oropharynx is clear.   Eyes:      General: Lids are normal. Lids are everted, no foreign bodies appreciated.      Extraocular Movements: Extraocular movements intact.      Conjunctiva/sclera: Conjunctivae normal.      Pupils: Pupils are equal, round, and reactive to light.   Neck:      Musculoskeletal: Full passive range of motion without pain, normal range of motion and neck supple.      Vascular: No carotid bruit or JVD.      Trachea: Trachea normal.   Cardiovascular:      Rate and Rhythm: Normal rate and regular rhythm.      Pulses: Normal pulses.      Heart sounds: Normal heart sounds.   Pulmonary:      Effort: Pulmonary effort is normal.      Breath sounds: Normal " breath sounds.   Abdominal:      General: Abdomen is flat. Bowel sounds are normal.      Palpations: Abdomen is soft.   Musculoskeletal: Normal range of motion.   Skin:     General: Skin is warm and dry.      Capillary Refill: Capillary refill takes less than 2 seconds.   Neurological:      General: No focal deficit present.      Mental Status: He is alert and oriented to person, place, and time.   Psychiatric:         Mood and Affect: Mood normal.         Speech: Speech normal.         Behavior: Behavior normal.         Thought Content: Thought content normal.         Judgment: Judgment normal.         Assessment:       1. Encounter for health maintenance examination    2. Routine lab draw    3. Screening cholesterol level    4. Need for influenza vaccination    5. Attention deficit hyperactivity disorder (ADHD), predominantly inattentive type    6. Mild intermittent reactive airway disease without complication    7. Encounter to establish care with new doctor    8. BMI 23.0-23.9, adult        Plan:       Juan Manuel was seen today for establish care.    Diagnoses and all orders for this visit:    Encounter for health maintenance examination  Annual wellness exam completed.    All medications, histories, and concerns reviewed, reconciled, and addressed.    Appropriate Screenings per pt's sex and age have been reviewed and discussed with pt.    BMI reviewed.    Routine lab draw  -     CBC auto differential; Future  -     Comprehensive metabolic panel; Future  -     Lipid Panel; Future  -     TSH; Future  -     Urinalysis; Future    Screening cholesterol level  -     Lipid Panel; Future    Need for influenza vaccination  Given today    Attention deficit hyperactivity disorder (ADHD), predominantly inattentive type  Managed by psych, stable on meds    Mild intermittent reactive airway disease without complication  Stable    Encounter to establish care with new doctor  Follow with one of MDs I work with who can be your  new PCP for your annual: Dr. Reyna Quintero, Dr. Alo Puga, Dr. Mary Rocha, Dr. Tylor Sosa, or Dr. Miguel Ángel Lofton    BMI 23.0-23.9, adult  BMI reviewed    Form filled out, will message pt with lab results to fill in     Follow up in about 1 year (around 9/23/2021) for for annual or sooner as needed with one of MDs recommended on AVS.

## 2020-09-23 NOTE — PATIENT INSTRUCTIONS
Check annual labs, will call with results, if stable repeat in 1yr with annual     Follow with one of MDs I work with who can be your new PCP for your annual: Dr. Reyna Quintero, Dr. Alo Puga, Dr. Mary Rocha, Dr. Tylor Sosa, or Dr. Miguel Ángel Lofton    Flu shot today    Form filled out, will message pt with lab results to fill in

## 2020-09-24 ENCOUNTER — PATIENT MESSAGE (OUTPATIENT)
Dept: INTERNAL MEDICINE | Facility: CLINIC | Age: 40
End: 2020-09-24

## 2020-10-01 ENCOUNTER — OFFICE VISIT (OUTPATIENT)
Dept: PSYCHIATRY | Facility: CLINIC | Age: 40
End: 2020-10-01
Payer: COMMERCIAL

## 2020-10-01 VITALS — BODY MASS INDEX: 23.31 KG/M2 | HEIGHT: 69 IN | RESPIRATION RATE: 15 BRPM

## 2020-10-01 DIAGNOSIS — Z86.79 HISTORY OF PALPITATIONS IN ADULTHOOD: ICD-10-CM

## 2020-10-01 DIAGNOSIS — I73.00 RAYNAUD'S DISEASE WITHOUT GANGRENE: ICD-10-CM

## 2020-10-01 DIAGNOSIS — F90.0 ATTENTION DEFICIT HYPERACTIVITY DISORDER (ADHD), PREDOMINANTLY INATTENTIVE TYPE: Primary | ICD-10-CM

## 2020-10-01 PROCEDURE — 99213 PR OFFICE/OUTPT VISIT, EST, LEVL III, 20-29 MIN: ICD-10-PCS | Mod: 95,,, | Performed by: PSYCHIATRY & NEUROLOGY

## 2020-10-01 PROCEDURE — 3008F BODY MASS INDEX DOCD: CPT | Mod: CPTII,,, | Performed by: PSYCHIATRY & NEUROLOGY

## 2020-10-01 PROCEDURE — 99213 OFFICE O/P EST LOW 20 MIN: CPT | Mod: 95,,, | Performed by: PSYCHIATRY & NEUROLOGY

## 2020-10-01 PROCEDURE — 3008F PR BODY MASS INDEX (BMI) DOCUMENTED: ICD-10-PCS | Mod: CPTII,,, | Performed by: PSYCHIATRY & NEUROLOGY

## 2020-10-01 RX ORDER — DEXTROAMPHETAMINE SACCHARATE, AMPHETAMINE ASPARTATE, DEXTROAMPHETAMINE SULFATE AND AMPHETAMINE SULFATE 5; 5; 5; 5 MG/1; MG/1; MG/1; MG/1
TABLET ORAL
Qty: 70 TABLET | Refills: 0 | Status: SHIPPED | OUTPATIENT
Start: 2020-10-01 | End: 2020-10-16 | Stop reason: SDUPTHER

## 2020-10-01 NOTE — PROGRESS NOTES
The patient location is: Home in [place]  The chief complaint leading to consultation is: inattention and distractibility    Visit type: audiovisual    Face to Face time with patient: 15 minutes  25 minutes of total time spent on the encounter, which includes face to face time and non-face to face time preparing to see the patient (eg, review of tests), Obtaining and/or reviewing separately obtained history, Documenting clinical information in the electronic or other health record, Independently interpreting results (not separately reported) and communicating results to the patient/family/caregiver, or Care coordination (not separately reported).         Each patient to whom he or she provides medical services by telemedicine is:  (1) informed of the relationship between the physician and patient and the respective role of any other health care provider with respect to management of the patient; and (2) notified that he or she may decline to receive medical services by telemedicine and may withdraw from such care at any time.          ESTABLISHED OUTPATIENT VISIT   E/M LEVEL 3: [id number]    ENCOUNTER DATE: 10/1/2020  SITE: [facility], [address]      HISTORY    CHIEF COMPLAINT   Juan Manuel Hdz is a 39 y.o. male who presents for followup of   Chief Complaint   Patient presents with    Inattention    Distractibilty       HPI   (Elements: Location, Quality, Severity, Duration, Timing, Content, Modifying Factors, Associated Signs & Symptoms)    Patient with history of ADHD, here for disease maintenance.  He is seen today for follow up appointment.  Overall doing ok.  He does notice some worsening of his focus/concentration due to environment - he is working from home and has baby that interrupts.  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  He has been taking about 50mg on week days and 30mg on weekends, so running low.  We will temporarily  "adjust dosing of adderall.  Otherwise no issues.  Sleep intact - has difficulty about 2-3x per month.  Appetite intact - weight down to 157lbs - he and wife are intermittently fasting.  No depression or excessive anxiety.  No tremor or palpitations.  Raynaud's improved.  He saw PMD on 9/23/20 and /70, HR 67, weight 157lbs.      5/20/20 - stable  2/29/29 - stable  11/20/19 - stable  8/21/19 - stable  4/25/19 - stable, no med changes  8/30/18 - continues to report reduced efficacy on current dose.  Dose titrated to adderall 20mg bid.  2/8/18 - partial effect with 20mg of adderall - taking 30 or even 40mg some days, dose continued at 10mg bid  12/7/16 - off adderall for some time, worsening procrastination/distraction at work, adderall restarted 10mg bid  10/8/15 - stable, no med changes  7/10/15 - seen by resident, stable, no med changes  9/25/14 - stable, no med changes      ROS   Cardiovascular ROS: negative for - chest pain/palpitations    Neuro ROS: no tremor        PFSH  Past Medical History reviewed: Yes  Family History reviewed: Yes  Social History reviewed: No      PSYCHOTROPIC MEDICATIONS   Adderall 20mg bid - taking between 30mg and 50mg per day       EXAMINATION    VITALS   Most recent vital signs, dated less than 90 days prior to this appointment, were reviewed.   Vitals:    10/01/20 0951   Resp: 15       CONSTITUTIONAL  General Appearance: stated age, casually dressed    MUSCULOSKELETAL  Muscle Strength and Tone: no weakness or spasticity  Abnormal Involuntary Movements: no tremor or tic noted  Gait and Station: ambulates without assistance - not observed today    PSYCHIATRIC   Level of Consciousness: alert  Orientation: to person, place, time  Grooming: adequately groomed  Psychomotor Behavior: no retardation or agitation  Speech: conversational, spontaneous  Language: fluent english, repeats words/phrases, mild lisp  Mood: "fine - good"  Affect: euthymic, pleasant  Thought Process: " linear  Associations: intact, no loosening of associations  Thought Content: no SI  Memory: intact  Attention: +distractibility noted  Fund of Knowledge: intact  Insight: intact  Judgment: intact    MEDICAL DECISION MAKING    DIAGNOSES  ADHD    PROBLEM LIST AND MANAGEMENT PLANS    New problem(s) (3 points each):   - none    Established problem(s), worsening (2 points each):   - none    Established problem(s), stable/improved (1 point each):  - ADHD.  Continue adderall as prescribed - will adjust dose so he can take 30/20mg on week days and 30mg on weekends - #70 for month.  [place] checked - no problems noted  - palpitations.  An issue in fall 2013 but not currently.  Cardiology cleared him to take stimulants.  - work/relationship issues.  Currently no major issues.  Continue to follow.  - Raynaud's.  Follow with MD.         PRESCRIPTION DRUG MANAGEMENT  Compliance: currently taking meds as prescribed with minor alterations in dose  Side Effects: none   Regimen Adjustments: Adderall 30/20mg #70 per month      DIAGNOSTIC TESTING  EKG 11/13/13 - QTc 397  2/14/19 - CMP, CBC reviewed  2/11/19 - lipid panel reviewed      David Kelly

## 2021-01-27 ENCOUNTER — OFFICE VISIT (OUTPATIENT)
Dept: PSYCHIATRY | Facility: CLINIC | Age: 41
End: 2021-01-27
Payer: COMMERCIAL

## 2021-01-27 VITALS — BODY MASS INDEX: 23.25 KG/M2 | HEIGHT: 69 IN | WEIGHT: 157 LBS | RESPIRATION RATE: 15 BRPM

## 2021-01-27 DIAGNOSIS — Z86.79 HISTORY OF PALPITATIONS IN ADULTHOOD: ICD-10-CM

## 2021-01-27 DIAGNOSIS — F90.0 ATTENTION DEFICIT HYPERACTIVITY DISORDER (ADHD), PREDOMINANTLY INATTENTIVE TYPE: Primary | ICD-10-CM

## 2021-01-27 DIAGNOSIS — I73.00 RAYNAUD'S DISEASE WITHOUT GANGRENE: ICD-10-CM

## 2021-01-27 PROCEDURE — 3008F BODY MASS INDEX DOCD: CPT | Mod: CPTII,,, | Performed by: PSYCHIATRY & NEUROLOGY

## 2021-01-27 PROCEDURE — 3008F PR BODY MASS INDEX (BMI) DOCUMENTED: ICD-10-PCS | Mod: CPTII,,, | Performed by: PSYCHIATRY & NEUROLOGY

## 2021-01-27 PROCEDURE — 99214 PR OFFICE/OUTPT VISIT, EST, LEVL IV, 30-39 MIN: ICD-10-PCS | Mod: 95,,, | Performed by: PSYCHIATRY & NEUROLOGY

## 2021-01-27 PROCEDURE — 99214 OFFICE O/P EST MOD 30 MIN: CPT | Mod: 95,,, | Performed by: PSYCHIATRY & NEUROLOGY

## 2021-01-27 RX ORDER — DEXTROAMPHETAMINE SACCHARATE, AMPHETAMINE ASPARTATE, DEXTROAMPHETAMINE SULFATE AND AMPHETAMINE SULFATE 5; 5; 5; 5 MG/1; MG/1; MG/1; MG/1
TABLET ORAL
Qty: 30 TABLET | Refills: 0 | Status: SHIPPED | OUTPATIENT
Start: 2021-01-27 | End: 2021-01-27

## 2021-01-27 RX ORDER — DEXTROAMPHETAMINE SACCHARATE, AMPHETAMINE ASPARTATE, DEXTROAMPHETAMINE SULFATE AND AMPHETAMINE SULFATE 5; 5; 5; 5 MG/1; MG/1; MG/1; MG/1
20 TABLET ORAL DAILY
Qty: 30 TABLET | Refills: 0 | Status: SHIPPED | OUTPATIENT
Start: 2021-01-27 | End: 2021-03-16 | Stop reason: SDUPTHER

## 2021-01-27 RX ORDER — DEXTROAMPHETAMINE SACCHARATE, AMPHETAMINE ASPARTATE, DEXTROAMPHETAMINE SULFATE AND AMPHETAMINE SULFATE 7.5; 7.5; 7.5; 7.5 MG/1; MG/1; MG/1; MG/1
30 TABLET ORAL DAILY
Qty: 30 TABLET | Refills: 0 | Status: SHIPPED | OUTPATIENT
Start: 2021-01-27 | End: 2021-03-16 | Stop reason: SDUPTHER

## 2021-03-09 ENCOUNTER — OFFICE VISIT (OUTPATIENT)
Dept: UROLOGY | Facility: CLINIC | Age: 41
End: 2021-03-09
Payer: COMMERCIAL

## 2021-03-09 VITALS
BODY MASS INDEX: 23.53 KG/M2 | SYSTOLIC BLOOD PRESSURE: 121 MMHG | HEIGHT: 69 IN | HEART RATE: 84 BPM | WEIGHT: 158.88 LBS | DIASTOLIC BLOOD PRESSURE: 76 MMHG

## 2021-03-09 DIAGNOSIS — M54.16 LUMBAR RADICULOPATHY: ICD-10-CM

## 2021-03-09 DIAGNOSIS — N45.1 LEFT EPIDIDYMITIS: Primary | ICD-10-CM

## 2021-03-09 PROCEDURE — 99999 PR PBB SHADOW E&M-EST. PATIENT-LVL III: ICD-10-PCS | Mod: PBBFAC,,, | Performed by: UROLOGY

## 2021-03-09 PROCEDURE — 1125F PR PAIN SEVERITY QUANTIFIED, PAIN PRESENT: ICD-10-PCS | Mod: S$GLB,,, | Performed by: UROLOGY

## 2021-03-09 PROCEDURE — 1125F AMNT PAIN NOTED PAIN PRSNT: CPT | Mod: S$GLB,,, | Performed by: UROLOGY

## 2021-03-09 PROCEDURE — 3008F PR BODY MASS INDEX (BMI) DOCUMENTED: ICD-10-PCS | Mod: CPTII,S$GLB,, | Performed by: UROLOGY

## 2021-03-09 PROCEDURE — 99203 PR OFFICE/OUTPT VISIT, NEW, LEVL III, 30-44 MIN: ICD-10-PCS | Mod: S$GLB,,, | Performed by: UROLOGY

## 2021-03-09 PROCEDURE — 99203 OFFICE O/P NEW LOW 30 MIN: CPT | Mod: S$GLB,,, | Performed by: UROLOGY

## 2021-03-09 PROCEDURE — 99999 PR PBB SHADOW E&M-EST. PATIENT-LVL III: CPT | Mod: PBBFAC,,, | Performed by: UROLOGY

## 2021-03-09 PROCEDURE — 3008F BODY MASS INDEX DOCD: CPT | Mod: CPTII,S$GLB,, | Performed by: UROLOGY

## 2021-04-15 ENCOUNTER — PATIENT MESSAGE (OUTPATIENT)
Dept: RESEARCH | Facility: HOSPITAL | Age: 41
End: 2021-04-15

## 2021-04-26 DIAGNOSIS — F90.0 ATTENTION DEFICIT HYPERACTIVITY DISORDER (ADHD), PREDOMINANTLY INATTENTIVE TYPE: ICD-10-CM

## 2021-04-26 RX ORDER — DEXTROAMPHETAMINE SACCHARATE, AMPHETAMINE ASPARTATE, DEXTROAMPHETAMINE SULFATE AND AMPHETAMINE SULFATE 7.5; 7.5; 7.5; 7.5 MG/1; MG/1; MG/1; MG/1
30 TABLET ORAL DAILY
Qty: 30 TABLET | Refills: 0 | Status: SHIPPED | OUTPATIENT
Start: 2021-04-26 | End: 2021-06-07 | Stop reason: SDUPTHER

## 2021-04-27 RX ORDER — DEXTROAMPHETAMINE SACCHARATE, AMPHETAMINE ASPARTATE, DEXTROAMPHETAMINE SULFATE AND AMPHETAMINE SULFATE 5; 5; 5; 5 MG/1; MG/1; MG/1; MG/1
20 TABLET ORAL DAILY
Qty: 30 TABLET | Refills: 0 | Status: SHIPPED | OUTPATIENT
Start: 2021-04-27 | End: 2021-06-07 | Stop reason: SDUPTHER

## 2021-06-09 ENCOUNTER — OFFICE VISIT (OUTPATIENT)
Dept: PSYCHIATRY | Facility: CLINIC | Age: 41
End: 2021-06-09
Payer: COMMERCIAL

## 2021-06-09 VITALS — WEIGHT: 165 LBS | RESPIRATION RATE: 15 BRPM | BODY MASS INDEX: 24.44 KG/M2 | HEIGHT: 69 IN

## 2021-06-09 DIAGNOSIS — I73.00 RAYNAUD'S DISEASE WITHOUT GANGRENE: ICD-10-CM

## 2021-06-09 DIAGNOSIS — F90.0 ATTENTION DEFICIT HYPERACTIVITY DISORDER (ADHD), PREDOMINANTLY INATTENTIVE TYPE: Primary | ICD-10-CM

## 2021-06-09 DIAGNOSIS — Z86.79 HISTORY OF PALPITATIONS IN ADULTHOOD: ICD-10-CM

## 2021-06-09 PROCEDURE — 99999 PR PBB SHADOW E&M-EST. PATIENT-LVL III: ICD-10-PCS | Mod: PBBFAC,,, | Performed by: PSYCHIATRY & NEUROLOGY

## 2021-06-09 PROCEDURE — 3008F BODY MASS INDEX DOCD: CPT | Mod: CPTII,S$GLB,, | Performed by: PSYCHIATRY & NEUROLOGY

## 2021-06-09 PROCEDURE — 99999 PR PBB SHADOW E&M-EST. PATIENT-LVL III: CPT | Mod: PBBFAC,,, | Performed by: PSYCHIATRY & NEUROLOGY

## 2021-06-09 PROCEDURE — 3008F PR BODY MASS INDEX (BMI) DOCUMENTED: ICD-10-PCS | Mod: CPTII,S$GLB,, | Performed by: PSYCHIATRY & NEUROLOGY

## 2021-06-09 PROCEDURE — 99214 PR OFFICE/OUTPT VISIT, EST, LEVL IV, 30-39 MIN: ICD-10-PCS | Mod: S$GLB,,, | Performed by: PSYCHIATRY & NEUROLOGY

## 2021-06-09 PROCEDURE — 99214 OFFICE O/P EST MOD 30 MIN: CPT | Mod: S$GLB,,, | Performed by: PSYCHIATRY & NEUROLOGY

## 2021-06-09 RX ORDER — DEXTROAMPHETAMINE SACCHARATE, AMPHETAMINE ASPARTATE, DEXTROAMPHETAMINE SULFATE AND AMPHETAMINE SULFATE 5; 5; 5; 5 MG/1; MG/1; MG/1; MG/1
20 TABLET ORAL DAILY
Qty: 30 TABLET | Refills: 0 | Status: SHIPPED | OUTPATIENT
Start: 2021-08-09 | End: 2021-10-21

## 2021-06-09 RX ORDER — DEXTROAMPHETAMINE SACCHARATE, AMPHETAMINE ASPARTATE, DEXTROAMPHETAMINE SULFATE AND AMPHETAMINE SULFATE 5; 5; 5; 5 MG/1; MG/1; MG/1; MG/1
20 TABLET ORAL DAILY
Qty: 30 TABLET | Refills: 0 | Status: SHIPPED | OUTPATIENT
Start: 2021-07-09 | End: 2021-06-09 | Stop reason: SDUPTHER

## 2021-06-09 RX ORDER — DEXTROAMPHETAMINE SACCHARATE, AMPHETAMINE ASPARTATE, DEXTROAMPHETAMINE SULFATE AND AMPHETAMINE SULFATE 7.5; 7.5; 7.5; 7.5 MG/1; MG/1; MG/1; MG/1
30 TABLET ORAL DAILY
Qty: 30 TABLET | Refills: 0 | Status: SHIPPED | OUTPATIENT
Start: 2021-07-09 | End: 2021-06-09 | Stop reason: SDUPTHER

## 2021-06-09 RX ORDER — DEXTROAMPHETAMINE SACCHARATE, AMPHETAMINE ASPARTATE, DEXTROAMPHETAMINE SULFATE AND AMPHETAMINE SULFATE 7.5; 7.5; 7.5; 7.5 MG/1; MG/1; MG/1; MG/1
30 TABLET ORAL DAILY
Qty: 30 TABLET | Refills: 0 | Status: SHIPPED | OUTPATIENT
Start: 2021-08-09 | End: 2021-10-21

## 2021-06-09 RX ORDER — DEXTROAMPHETAMINE SACCHARATE, AMPHETAMINE ASPARTATE, DEXTROAMPHETAMINE SULFATE AND AMPHETAMINE SULFATE 7.5; 7.5; 7.5; 7.5 MG/1; MG/1; MG/1; MG/1
30 TABLET ORAL DAILY
Qty: 30 TABLET | Refills: 0 | Status: SHIPPED | OUTPATIENT
Start: 2021-06-09 | End: 2021-06-09 | Stop reason: SDUPTHER

## 2021-06-09 RX ORDER — DEXTROAMPHETAMINE SACCHARATE, AMPHETAMINE ASPARTATE, DEXTROAMPHETAMINE SULFATE AND AMPHETAMINE SULFATE 5; 5; 5; 5 MG/1; MG/1; MG/1; MG/1
20 TABLET ORAL DAILY
Qty: 30 TABLET | Refills: 0 | Status: SHIPPED | OUTPATIENT
Start: 2021-06-09 | End: 2021-06-09 | Stop reason: SDUPTHER

## 2021-07-13 ENCOUNTER — IMMUNIZATION (OUTPATIENT)
Dept: PHARMACY | Facility: CLINIC | Age: 41
End: 2021-07-13
Payer: COMMERCIAL

## 2021-07-13 DIAGNOSIS — Z23 NEED FOR VACCINATION: Primary | ICD-10-CM

## 2021-09-29 ENCOUNTER — LAB VISIT (OUTPATIENT)
Dept: LAB | Facility: HOSPITAL | Age: 41
End: 2021-09-29
Payer: COMMERCIAL

## 2021-09-29 ENCOUNTER — OFFICE VISIT (OUTPATIENT)
Dept: INTERNAL MEDICINE | Facility: CLINIC | Age: 41
End: 2021-09-29
Payer: COMMERCIAL

## 2021-09-29 ENCOUNTER — IMMUNIZATION (OUTPATIENT)
Dept: INTERNAL MEDICINE | Facility: CLINIC | Age: 41
End: 2021-09-29
Payer: COMMERCIAL

## 2021-09-29 ENCOUNTER — PATIENT MESSAGE (OUTPATIENT)
Dept: INTERNAL MEDICINE | Facility: CLINIC | Age: 41
End: 2021-09-29

## 2021-09-29 VITALS
SYSTOLIC BLOOD PRESSURE: 120 MMHG | BODY MASS INDEX: 24.44 KG/M2 | HEIGHT: 69 IN | HEART RATE: 65 BPM | WEIGHT: 165 LBS | OXYGEN SATURATION: 99 % | DIASTOLIC BLOOD PRESSURE: 80 MMHG

## 2021-09-29 DIAGNOSIS — R17 TOTAL BILIRUBIN, ELEVATED: ICD-10-CM

## 2021-09-29 DIAGNOSIS — Z00.00 ENCOUNTER FOR PREVENTIVE HEALTH EXAMINATION: ICD-10-CM

## 2021-09-29 DIAGNOSIS — I73.00 RAYNAUD'S DISEASE WITHOUT GANGRENE: ICD-10-CM

## 2021-09-29 DIAGNOSIS — Z01.89 ROUTINE LAB DRAW: ICD-10-CM

## 2021-09-29 DIAGNOSIS — F90.0 ATTENTION DEFICIT HYPERACTIVITY DISORDER (ADHD), PREDOMINANTLY INATTENTIVE TYPE: ICD-10-CM

## 2021-09-29 DIAGNOSIS — J45.20 MILD INTERMITTENT REACTIVE AIRWAY DISEASE WITHOUT COMPLICATION: ICD-10-CM

## 2021-09-29 DIAGNOSIS — Z00.00 ENCOUNTER FOR PREVENTIVE HEALTH EXAMINATION: Primary | ICD-10-CM

## 2021-09-29 PROBLEM — K35.80 ACUTE APPENDICITIS: Status: RESOLVED | Noted: 2017-11-21 | Resolved: 2021-09-29

## 2021-09-29 LAB
ALBUMIN SERPL BCP-MCNC: 4.4 G/DL (ref 3.5–5.2)
ALP SERPL-CCNC: 35 U/L (ref 55–135)
ALT SERPL W/O P-5'-P-CCNC: 23 U/L (ref 10–44)
ANION GAP SERPL CALC-SCNC: 11 MMOL/L (ref 8–16)
AST SERPL-CCNC: 26 U/L (ref 10–40)
BASOPHILS # BLD AUTO: 0.03 K/UL (ref 0–0.2)
BASOPHILS NFR BLD: 0.6 % (ref 0–1.9)
BILIRUB SERPL-MCNC: 1.1 MG/DL (ref 0.1–1)
BUN SERPL-MCNC: 17 MG/DL (ref 6–20)
CALCIUM SERPL-MCNC: 9.6 MG/DL (ref 8.7–10.5)
CHLORIDE SERPL-SCNC: 103 MMOL/L (ref 95–110)
CHOLEST SERPL-MCNC: 178 MG/DL (ref 120–199)
CHOLEST/HDLC SERPL: 2.9 {RATIO} (ref 2–5)
CO2 SERPL-SCNC: 23 MMOL/L (ref 23–29)
CREAT SERPL-MCNC: 0.9 MG/DL (ref 0.5–1.4)
DIFFERENTIAL METHOD: ABNORMAL
EOSINOPHIL # BLD AUTO: 0.2 K/UL (ref 0–0.5)
EOSINOPHIL NFR BLD: 2.9 % (ref 0–8)
ERYTHROCYTE [DISTWIDTH] IN BLOOD BY AUTOMATED COUNT: 12.3 % (ref 11.5–14.5)
EST. GFR  (AFRICAN AMERICAN): >60 ML/MIN/1.73 M^2
EST. GFR  (NON AFRICAN AMERICAN): >60 ML/MIN/1.73 M^2
GLUCOSE SERPL-MCNC: 85 MG/DL (ref 70–110)
HCT VFR BLD AUTO: 43.9 % (ref 40–54)
HDLC SERPL-MCNC: 61 MG/DL (ref 40–75)
HDLC SERPL: 34.3 % (ref 20–50)
HGB BLD-MCNC: 15 G/DL (ref 14–18)
IMM GRANULOCYTES # BLD AUTO: 0.01 K/UL (ref 0–0.04)
IMM GRANULOCYTES NFR BLD AUTO: 0.2 % (ref 0–0.5)
LDLC SERPL CALC-MCNC: 103 MG/DL (ref 63–159)
LYMPHOCYTES # BLD AUTO: 1.6 K/UL (ref 1–4.8)
LYMPHOCYTES NFR BLD: 30.4 % (ref 18–48)
MCH RBC QN AUTO: 30.1 PG (ref 27–31)
MCHC RBC AUTO-ENTMCNC: 34.2 G/DL (ref 32–36)
MCV RBC AUTO: 88 FL (ref 82–98)
MONOCYTES # BLD AUTO: 0.4 K/UL (ref 0.3–1)
MONOCYTES NFR BLD: 7.6 % (ref 4–15)
NEUTROPHILS # BLD AUTO: 3 K/UL (ref 1.8–7.7)
NEUTROPHILS NFR BLD: 58.3 % (ref 38–73)
NONHDLC SERPL-MCNC: 117 MG/DL
NRBC BLD-RTO: 0 /100 WBC
PLATELET # BLD AUTO: 139 K/UL (ref 150–450)
PMV BLD AUTO: 10.5 FL (ref 9.2–12.9)
POTASSIUM SERPL-SCNC: 4.4 MMOL/L (ref 3.5–5.1)
PROT SERPL-MCNC: 7.4 G/DL (ref 6–8.4)
RBC # BLD AUTO: 4.99 M/UL (ref 4.6–6.2)
SODIUM SERPL-SCNC: 137 MMOL/L (ref 136–145)
TRIGL SERPL-MCNC: 70 MG/DL (ref 30–150)
TSH SERPL DL<=0.005 MIU/L-ACNC: 1.72 UIU/ML (ref 0.4–4)
WBC # BLD AUTO: 5.13 K/UL (ref 3.9–12.7)

## 2021-09-29 PROCEDURE — 1160F PR REVIEW ALL MEDS BY PRESCRIBER/CLIN PHARMACIST DOCUMENTED: ICD-10-PCS | Mod: CPTII,S$GLB,, | Performed by: NURSE PRACTITIONER

## 2021-09-29 PROCEDURE — 1159F MED LIST DOCD IN RCRD: CPT | Mod: CPTII,S$GLB,, | Performed by: NURSE PRACTITIONER

## 2021-09-29 PROCEDURE — 99396 PREV VISIT EST AGE 40-64: CPT | Mod: 25,S$GLB,, | Performed by: NURSE PRACTITIONER

## 2021-09-29 PROCEDURE — 90686 IIV4 VACC NO PRSV 0.5 ML IM: CPT | Mod: S$GLB,,, | Performed by: NURSE PRACTITIONER

## 2021-09-29 PROCEDURE — 3008F PR BODY MASS INDEX (BMI) DOCUMENTED: ICD-10-PCS | Mod: CPTII,S$GLB,, | Performed by: NURSE PRACTITIONER

## 2021-09-29 PROCEDURE — 85025 COMPLETE CBC W/AUTO DIFF WBC: CPT | Performed by: NURSE PRACTITIONER

## 2021-09-29 PROCEDURE — 36415 COLL VENOUS BLD VENIPUNCTURE: CPT | Performed by: NURSE PRACTITIONER

## 2021-09-29 PROCEDURE — 3079F PR MOST RECENT DIASTOLIC BLOOD PRESSURE 80-89 MM HG: ICD-10-PCS | Mod: CPTII,S$GLB,, | Performed by: NURSE PRACTITIONER

## 2021-09-29 PROCEDURE — 1160F RVW MEDS BY RX/DR IN RCRD: CPT | Mod: CPTII,S$GLB,, | Performed by: NURSE PRACTITIONER

## 2021-09-29 PROCEDURE — 99396 PR PREVENTIVE VISIT,EST,40-64: ICD-10-PCS | Mod: 25,S$GLB,, | Performed by: NURSE PRACTITIONER

## 2021-09-29 PROCEDURE — 3074F SYST BP LT 130 MM HG: CPT | Mod: CPTII,S$GLB,, | Performed by: NURSE PRACTITIONER

## 2021-09-29 PROCEDURE — 3008F BODY MASS INDEX DOCD: CPT | Mod: CPTII,S$GLB,, | Performed by: NURSE PRACTITIONER

## 2021-09-29 PROCEDURE — 99999 PR PBB SHADOW E&M-EST. PATIENT-LVL III: CPT | Mod: PBBFAC,,, | Performed by: NURSE PRACTITIONER

## 2021-09-29 PROCEDURE — 90471 FLU VACCINE (QUAD) GREATER THAN OR EQUAL TO 3YO PRESERVATIVE FREE IM: ICD-10-PCS | Mod: S$GLB,,, | Performed by: NURSE PRACTITIONER

## 2021-09-29 PROCEDURE — 3079F DIAST BP 80-89 MM HG: CPT | Mod: CPTII,S$GLB,, | Performed by: NURSE PRACTITIONER

## 2021-09-29 PROCEDURE — 90471 IMMUNIZATION ADMIN: CPT | Mod: S$GLB,,, | Performed by: NURSE PRACTITIONER

## 2021-09-29 PROCEDURE — 3074F PR MOST RECENT SYSTOLIC BLOOD PRESSURE < 130 MM HG: ICD-10-PCS | Mod: CPTII,S$GLB,, | Performed by: NURSE PRACTITIONER

## 2021-09-29 PROCEDURE — 80053 COMPREHEN METABOLIC PANEL: CPT | Performed by: NURSE PRACTITIONER

## 2021-09-29 PROCEDURE — 99999 PR PBB SHADOW E&M-EST. PATIENT-LVL III: ICD-10-PCS | Mod: PBBFAC,,, | Performed by: NURSE PRACTITIONER

## 2021-09-29 PROCEDURE — 84443 ASSAY THYROID STIM HORMONE: CPT | Performed by: NURSE PRACTITIONER

## 2021-09-29 PROCEDURE — 80061 LIPID PANEL: CPT | Performed by: NURSE PRACTITIONER

## 2021-09-29 PROCEDURE — 90686 FLU VACCINE (QUAD) GREATER THAN OR EQUAL TO 3YO PRESERVATIVE FREE IM: ICD-10-PCS | Mod: S$GLB,,, | Performed by: NURSE PRACTITIONER

## 2021-09-29 PROCEDURE — 1159F PR MEDICATION LIST DOCUMENTED IN MEDICAL RECORD: ICD-10-PCS | Mod: CPTII,S$GLB,, | Performed by: NURSE PRACTITIONER

## 2021-10-04 ENCOUNTER — TELEPHONE (OUTPATIENT)
Dept: INTERNAL MEDICINE | Facility: CLINIC | Age: 41
End: 2021-10-04

## 2021-10-04 DIAGNOSIS — D69.6 THROMBOCYTOPENIA: Primary | ICD-10-CM

## 2021-10-08 ENCOUNTER — TELEPHONE (OUTPATIENT)
Dept: HEMATOLOGY/ONCOLOGY | Facility: CLINIC | Age: 41
End: 2021-10-08

## 2021-10-20 ENCOUNTER — PATIENT MESSAGE (OUTPATIENT)
Dept: PSYCHIATRY | Facility: CLINIC | Age: 41
End: 2021-10-20
Payer: COMMERCIAL

## 2021-10-21 ENCOUNTER — TELEPHONE (OUTPATIENT)
Dept: PSYCHIATRY | Facility: CLINIC | Age: 41
End: 2021-10-21

## 2021-10-21 DIAGNOSIS — F90.0 ATTENTION DEFICIT HYPERACTIVITY DISORDER (ADHD), PREDOMINANTLY INATTENTIVE TYPE: ICD-10-CM

## 2021-10-21 RX ORDER — DEXTROAMPHETAMINE SACCHARATE, AMPHETAMINE ASPARTATE, DEXTROAMPHETAMINE SULFATE AND AMPHETAMINE SULFATE 5; 5; 5; 5 MG/1; MG/1; MG/1; MG/1
20 TABLET ORAL DAILY
Qty: 30 TABLET | Refills: 0 | Status: SHIPPED | OUTPATIENT
Start: 2021-10-21 | End: 2021-12-06

## 2021-10-21 RX ORDER — DEXTROAMPHETAMINE SACCHARATE, AMPHETAMINE ASPARTATE, DEXTROAMPHETAMINE SULFATE AND AMPHETAMINE SULFATE 7.5; 7.5; 7.5; 7.5 MG/1; MG/1; MG/1; MG/1
30 TABLET ORAL DAILY
Qty: 30 TABLET | Refills: 0 | Status: SHIPPED | OUTPATIENT
Start: 2021-10-21 | End: 2021-12-06

## 2021-10-28 ENCOUNTER — DOCUMENTATION ONLY (OUTPATIENT)
Dept: PSYCHIATRY | Facility: HOSPITAL | Age: 41
End: 2021-10-28
Payer: COMMERCIAL

## 2021-10-28 ENCOUNTER — TELEPHONE (OUTPATIENT)
Dept: PSYCHIATRY | Facility: HOSPITAL | Age: 41
End: 2021-10-28
Payer: COMMERCIAL

## 2021-11-05 ENCOUNTER — OFFICE VISIT (OUTPATIENT)
Dept: HEMATOLOGY/ONCOLOGY | Facility: CLINIC | Age: 41
End: 2021-11-05
Payer: COMMERCIAL

## 2021-11-05 ENCOUNTER — LAB VISIT (OUTPATIENT)
Dept: LAB | Facility: HOSPITAL | Age: 41
End: 2021-11-05
Attending: INTERNAL MEDICINE
Payer: COMMERCIAL

## 2021-11-05 VITALS
WEIGHT: 164 LBS | DIASTOLIC BLOOD PRESSURE: 77 MMHG | RESPIRATION RATE: 12 BRPM | HEART RATE: 102 BPM | TEMPERATURE: 99 F | BODY MASS INDEX: 23.48 KG/M2 | HEIGHT: 70 IN | OXYGEN SATURATION: 97 % | SYSTOLIC BLOOD PRESSURE: 113 MMHG

## 2021-11-05 DIAGNOSIS — I73.00 RAYNAUD'S DISEASE WITHOUT GANGRENE: ICD-10-CM

## 2021-11-05 DIAGNOSIS — D69.6 THROMBOCYTOPENIA: Primary | ICD-10-CM

## 2021-11-05 DIAGNOSIS — D69.6 THROMBOCYTOPENIA: ICD-10-CM

## 2021-11-05 LAB
BASOPHILS # BLD AUTO: 0.03 K/UL (ref 0–0.2)
BASOPHILS NFR BLD: 0.5 % (ref 0–1.9)
DIFFERENTIAL METHOD: ABNORMAL
EOSINOPHIL # BLD AUTO: 0.2 K/UL (ref 0–0.5)
EOSINOPHIL NFR BLD: 2.8 % (ref 0–8)
ERYTHROCYTE [DISTWIDTH] IN BLOOD BY AUTOMATED COUNT: 11.9 % (ref 11.5–14.5)
HCT VFR BLD AUTO: 44.3 % (ref 40–54)
HGB BLD-MCNC: 15.2 G/DL (ref 14–18)
IMM GRANULOCYTES # BLD AUTO: 0.02 K/UL (ref 0–0.04)
IMM GRANULOCYTES NFR BLD AUTO: 0.4 % (ref 0–0.5)
LDH SERPL L TO P-CCNC: 167 U/L (ref 110–260)
LYMPHOCYTES # BLD AUTO: 1.3 K/UL (ref 1–4.8)
LYMPHOCYTES NFR BLD: 22.1 % (ref 18–48)
MCH RBC QN AUTO: 29.8 PG (ref 27–31)
MCHC RBC AUTO-ENTMCNC: 34.3 G/DL (ref 32–36)
MCV RBC AUTO: 87 FL (ref 82–98)
MONOCYTES # BLD AUTO: 0.5 K/UL (ref 0.3–1)
MONOCYTES NFR BLD: 8.2 % (ref 4–15)
NEUTROPHILS # BLD AUTO: 3.8 K/UL (ref 1.8–7.7)
NEUTROPHILS NFR BLD: 66 % (ref 38–73)
NRBC BLD-RTO: 0 /100 WBC
PLATELET # BLD AUTO: 170 K/UL (ref 150–450)
PMV BLD AUTO: 9.1 FL (ref 9.2–12.9)
RBC # BLD AUTO: 5.1 M/UL (ref 4.6–6.2)
WBC # BLD AUTO: 5.7 K/UL (ref 3.9–12.7)

## 2021-11-05 PROCEDURE — 85025 COMPLETE CBC W/AUTO DIFF WBC: CPT | Performed by: INTERNAL MEDICINE

## 2021-11-05 PROCEDURE — 1159F PR MEDICATION LIST DOCUMENTED IN MEDICAL RECORD: ICD-10-PCS | Mod: CPTII,S$GLB,, | Performed by: INTERNAL MEDICINE

## 2021-11-05 PROCEDURE — 36415 COLL VENOUS BLD VENIPUNCTURE: CPT | Performed by: INTERNAL MEDICINE

## 2021-11-05 PROCEDURE — 3074F PR MOST RECENT SYSTOLIC BLOOD PRESSURE < 130 MM HG: ICD-10-PCS | Mod: CPTII,S$GLB,, | Performed by: INTERNAL MEDICINE

## 2021-11-05 PROCEDURE — 3078F DIAST BP <80 MM HG: CPT | Mod: CPTII,S$GLB,, | Performed by: INTERNAL MEDICINE

## 2021-11-05 PROCEDURE — 99999 PR PBB SHADOW E&M-EST. PATIENT-LVL IV: ICD-10-PCS | Mod: PBBFAC,,, | Performed by: INTERNAL MEDICINE

## 2021-11-05 PROCEDURE — 99999 PR PBB SHADOW E&M-EST. PATIENT-LVL IV: CPT | Mod: PBBFAC,,, | Performed by: INTERNAL MEDICINE

## 2021-11-05 PROCEDURE — 99205 PR OFFICE/OUTPT VISIT, NEW, LEVL V, 60-74 MIN: ICD-10-PCS | Mod: S$GLB,,, | Performed by: INTERNAL MEDICINE

## 2021-11-05 PROCEDURE — 3008F BODY MASS INDEX DOCD: CPT | Mod: CPTII,S$GLB,, | Performed by: INTERNAL MEDICINE

## 2021-11-05 PROCEDURE — 3074F SYST BP LT 130 MM HG: CPT | Mod: CPTII,S$GLB,, | Performed by: INTERNAL MEDICINE

## 2021-11-05 PROCEDURE — 1159F MED LIST DOCD IN RCRD: CPT | Mod: CPTII,S$GLB,, | Performed by: INTERNAL MEDICINE

## 2021-11-05 PROCEDURE — 83615 LACTATE (LD) (LDH) ENZYME: CPT | Performed by: INTERNAL MEDICINE

## 2021-11-05 PROCEDURE — 3008F PR BODY MASS INDEX (BMI) DOCUMENTED: ICD-10-PCS | Mod: CPTII,S$GLB,, | Performed by: INTERNAL MEDICINE

## 2021-11-05 PROCEDURE — 3078F PR MOST RECENT DIASTOLIC BLOOD PRESSURE < 80 MM HG: ICD-10-PCS | Mod: CPTII,S$GLB,, | Performed by: INTERNAL MEDICINE

## 2021-11-05 PROCEDURE — 99205 OFFICE O/P NEW HI 60 MIN: CPT | Mod: S$GLB,,, | Performed by: INTERNAL MEDICINE

## 2021-11-09 PROBLEM — D69.6 THROMBOCYTOPENIA: Status: ACTIVE | Noted: 2021-11-09

## 2021-11-15 ENCOUNTER — OFFICE VISIT (OUTPATIENT)
Dept: PODIATRY | Facility: CLINIC | Age: 41
End: 2021-11-15
Payer: COMMERCIAL

## 2021-11-15 VITALS
SYSTOLIC BLOOD PRESSURE: 131 MMHG | HEART RATE: 81 BPM | WEIGHT: 164 LBS | BODY MASS INDEX: 23.38 KG/M2 | DIASTOLIC BLOOD PRESSURE: 83 MMHG

## 2021-11-15 DIAGNOSIS — B35.1 ONYCHOMYCOSIS DUE TO DERMATOPHYTE: Primary | ICD-10-CM

## 2021-11-15 PROCEDURE — 3075F PR MOST RECENT SYSTOLIC BLOOD PRESS GE 130-139MM HG: ICD-10-PCS | Mod: CPTII,S$GLB,, | Performed by: PODIATRIST

## 2021-11-15 PROCEDURE — 3075F SYST BP GE 130 - 139MM HG: CPT | Mod: CPTII,S$GLB,, | Performed by: PODIATRIST

## 2021-11-15 PROCEDURE — 1160F RVW MEDS BY RX/DR IN RCRD: CPT | Mod: CPTII,S$GLB,, | Performed by: PODIATRIST

## 2021-11-15 PROCEDURE — 1160F PR REVIEW ALL MEDS BY PRESCRIBER/CLIN PHARMACIST DOCUMENTED: ICD-10-PCS | Mod: CPTII,S$GLB,, | Performed by: PODIATRIST

## 2021-11-15 PROCEDURE — 1159F PR MEDICATION LIST DOCUMENTED IN MEDICAL RECORD: ICD-10-PCS | Mod: CPTII,S$GLB,, | Performed by: PODIATRIST

## 2021-11-15 PROCEDURE — 99204 OFFICE O/P NEW MOD 45 MIN: CPT | Mod: S$GLB,,, | Performed by: PODIATRIST

## 2021-11-15 PROCEDURE — 99204 PR OFFICE/OUTPT VISIT, NEW, LEVL IV, 45-59 MIN: ICD-10-PCS | Mod: S$GLB,,, | Performed by: PODIATRIST

## 2021-11-15 PROCEDURE — 3079F PR MOST RECENT DIASTOLIC BLOOD PRESSURE 80-89 MM HG: ICD-10-PCS | Mod: CPTII,S$GLB,, | Performed by: PODIATRIST

## 2021-11-15 PROCEDURE — 3008F PR BODY MASS INDEX (BMI) DOCUMENTED: ICD-10-PCS | Mod: CPTII,S$GLB,, | Performed by: PODIATRIST

## 2021-11-15 PROCEDURE — 99999 PR PBB SHADOW E&M-EST. PATIENT-LVL III: CPT | Mod: PBBFAC,,, | Performed by: PODIATRIST

## 2021-11-15 PROCEDURE — 99999 PR PBB SHADOW E&M-EST. PATIENT-LVL III: ICD-10-PCS | Mod: PBBFAC,,, | Performed by: PODIATRIST

## 2021-11-15 PROCEDURE — 1159F MED LIST DOCD IN RCRD: CPT | Mod: CPTII,S$GLB,, | Performed by: PODIATRIST

## 2021-11-15 PROCEDURE — 3008F BODY MASS INDEX DOCD: CPT | Mod: CPTII,S$GLB,, | Performed by: PODIATRIST

## 2021-11-15 PROCEDURE — 3079F DIAST BP 80-89 MM HG: CPT | Mod: CPTII,S$GLB,, | Performed by: PODIATRIST

## 2021-11-15 RX ORDER — TERBINAFINE HYDROCHLORIDE 250 MG/1
250 TABLET ORAL DAILY
Qty: 45 TABLET | Refills: 0 | Status: SHIPPED | OUTPATIENT
Start: 2021-11-15 | End: 2021-12-30

## 2021-12-06 ENCOUNTER — TELEPHONE (OUTPATIENT)
Dept: PSYCHIATRY | Facility: HOSPITAL | Age: 41
End: 2021-12-06
Payer: COMMERCIAL

## 2021-12-06 DIAGNOSIS — F90.0 ATTENTION DEFICIT HYPERACTIVITY DISORDER (ADHD), PREDOMINANTLY INATTENTIVE TYPE: ICD-10-CM

## 2021-12-06 RX ORDER — DEXTROAMPHETAMINE SACCHARATE, AMPHETAMINE ASPARTATE, DEXTROAMPHETAMINE SULFATE AND AMPHETAMINE SULFATE 7.5; 7.5; 7.5; 7.5 MG/1; MG/1; MG/1; MG/1
30 TABLET ORAL DAILY
Qty: 30 TABLET | Refills: 0 | Status: SHIPPED | OUTPATIENT
Start: 2021-12-06 | End: 2022-01-17 | Stop reason: SDUPTHER

## 2021-12-06 RX ORDER — DEXTROAMPHETAMINE SACCHARATE, AMPHETAMINE ASPARTATE, DEXTROAMPHETAMINE SULFATE AND AMPHETAMINE SULFATE 5; 5; 5; 5 MG/1; MG/1; MG/1; MG/1
20 TABLET ORAL DAILY
Qty: 30 TABLET | Refills: 0 | Status: SHIPPED | OUTPATIENT
Start: 2021-12-06 | End: 2022-01-17 | Stop reason: SDUPTHER

## 2022-01-04 ENCOUNTER — LAB VISIT (OUTPATIENT)
Dept: PRIMARY CARE CLINIC | Facility: OTHER | Age: 42
End: 2022-01-04
Payer: COMMERCIAL

## 2022-01-04 DIAGNOSIS — Z20.822 ENCOUNTER FOR LABORATORY TESTING FOR COVID-19 VIRUS: ICD-10-CM

## 2022-01-04 PROCEDURE — U0003 INFECTIOUS AGENT DETECTION BY NUCLEIC ACID (DNA OR RNA); SEVERE ACUTE RESPIRATORY SYNDROME CORONAVIRUS 2 (SARS-COV-2) (CORONAVIRUS DISEASE [COVID-19]), AMPLIFIED PROBE TECHNIQUE, MAKING USE OF HIGH THROUGHPUT TECHNOLOGIES AS DESCRIBED BY CMS-2020-01-R: HCPCS

## 2022-01-08 DIAGNOSIS — U07.1 COVID-19 VIRUS DETECTED: ICD-10-CM

## 2022-01-08 LAB
SARS-COV-2 RNA RESP QL NAA+PROBE: DETECTED
SARS-COV-2- CYCLE NUMBER: 15

## 2022-01-20 ENCOUNTER — OFFICE VISIT (OUTPATIENT)
Dept: PSYCHIATRY | Facility: CLINIC | Age: 42
End: 2022-01-20
Payer: COMMERCIAL

## 2022-01-20 VITALS — BODY MASS INDEX: 24.22 KG/M2 | HEIGHT: 69 IN

## 2022-01-20 DIAGNOSIS — I73.00 RAYNAUD'S DISEASE WITHOUT GANGRENE: ICD-10-CM

## 2022-01-20 DIAGNOSIS — F90.0 ATTENTION DEFICIT HYPERACTIVITY DISORDER (ADHD), PREDOMINANTLY INATTENTIVE TYPE: Primary | ICD-10-CM

## 2022-01-20 DIAGNOSIS — Z86.79 HISTORY OF PALPITATIONS IN ADULTHOOD: ICD-10-CM

## 2022-01-20 PROCEDURE — 99214 PR OFFICE/OUTPT VISIT, EST, LEVL IV, 30-39 MIN: ICD-10-PCS | Mod: 95,,, | Performed by: PSYCHIATRY & NEUROLOGY

## 2022-01-20 PROCEDURE — 99214 OFFICE O/P EST MOD 30 MIN: CPT | Mod: 95,,, | Performed by: PSYCHIATRY & NEUROLOGY

## 2022-01-20 PROCEDURE — 1159F PR MEDICATION LIST DOCUMENTED IN MEDICAL RECORD: ICD-10-PCS | Mod: CPTII,95,, | Performed by: PSYCHIATRY & NEUROLOGY

## 2022-01-20 PROCEDURE — 1160F RVW MEDS BY RX/DR IN RCRD: CPT | Mod: CPTII,95,, | Performed by: PSYCHIATRY & NEUROLOGY

## 2022-01-20 PROCEDURE — 3008F PR BODY MASS INDEX (BMI) DOCUMENTED: ICD-10-PCS | Mod: CPTII,95,, | Performed by: PSYCHIATRY & NEUROLOGY

## 2022-01-20 PROCEDURE — 1160F PR REVIEW ALL MEDS BY PRESCRIBER/CLIN PHARMACIST DOCUMENTED: ICD-10-PCS | Mod: CPTII,95,, | Performed by: PSYCHIATRY & NEUROLOGY

## 2022-01-20 PROCEDURE — 3008F BODY MASS INDEX DOCD: CPT | Mod: CPTII,95,, | Performed by: PSYCHIATRY & NEUROLOGY

## 2022-01-20 PROCEDURE — 1159F MED LIST DOCD IN RCRD: CPT | Mod: CPTII,95,, | Performed by: PSYCHIATRY & NEUROLOGY

## 2022-01-20 RX ORDER — DEXTROAMPHETAMINE SACCHARATE, AMPHETAMINE ASPARTATE, DEXTROAMPHETAMINE SULFATE AND AMPHETAMINE SULFATE 7.5; 7.5; 7.5; 7.5 MG/1; MG/1; MG/1; MG/1
30 TABLET ORAL DAILY
Qty: 30 TABLET | Refills: 0 | Status: SHIPPED | OUTPATIENT
Start: 2022-01-20 | End: 2022-02-23 | Stop reason: SDUPTHER

## 2022-01-20 RX ORDER — DEXTROAMPHETAMINE SACCHARATE, AMPHETAMINE ASPARTATE, DEXTROAMPHETAMINE SULFATE AND AMPHETAMINE SULFATE 5; 5; 5; 5 MG/1; MG/1; MG/1; MG/1
20 TABLET ORAL DAILY
Qty: 30 TABLET | Refills: 0 | Status: SHIPPED | OUTPATIENT
Start: 2022-01-20 | End: 2022-02-23 | Stop reason: SDUPTHER

## 2022-01-20 NOTE — PATIENT INSTRUCTIONS
AFTER VISIT INSTRUCTIONS     Take medication as prescribed.   If questions or concerns arise, or if experiencing side effects, adverse reactions or worsening symptoms, contact me through the [facility] portal or call [phone] to speak with office staff.   In cases of emergencies, call [phone] or present to the emergency department for immediate assistance.        ADJUSTMENTS TO REGIMEN: None    LAB WORK: None Needed/None Ordered              RESOURCES:    National [employer] of Mental [employer]: information on mental health medications.   https://www.[facility].nih.gov/health/topics/mental-health-medications/    The National Suicide Prevention Lifeline: 1-199.508.9101.  Provides 24/7, free and confidential support for people in distress, prevention and crisis resources for you or your loved ones, and best practices for professionals.   https://suicidepreventionlifeline.org/         Thank you for allowing me to participate as part of your health care team, and thank you for choosing [facility].    KT WADE MD  Board Certified in Psychiatry & Addiction Medicine

## 2022-01-20 NOTE — PROGRESS NOTES
The patient location is: at home in the [facility].    Visit type: audiovisual    Face to Face time with patient: 16 minutes  Total time spent on the encounter: 25 minutes    Each patient to whom he or she provides medical services by telemedicine is:  (1) informed of the relationship between the physician and patient and the respective role of any other health care provider with respect to management of the patient; and (2) notified that he or she may decline to receive medical services by telemedicine and may withdraw from such care at any time.    For Audio Only Telehealth Visits: the reason for the audio only service rather than synchronous audio and video virtual visit was related to technical difficulties or patient preference/necessity.  This service was not originating from a related E/M service provided within the previous 7 days nor will  to an E/M service or procedure within the next 24 hours or my soonest available appointment.  Prevailing standard of care was able to be met in this audio-only visit. Patient verbally consented to receive this service via voice-only telephone call.      ESTABLISHED OUTPATIENT VISIT     DEPARTMENT:  Psychiatry    EXAMINING PRACTITIONER: David Kelly MD  BOARD CERTIFICATION: Psychiatry & Addiction Medicine      HISTORY:       Patient Name: Juan Manuel Hdz  YOB: 1980    CHIEF COMPLAINT   Juan Manuel Hdz is a 41 y.o. male who presents to the clinic for a follow up psychiatric appointment.  See MEDICAL DECISION MAKING below for enumeration and discussion of patient's DIAGNOSES & PROBLEMS      HPI, PSYCHIATRIC ROS & APPLICABLE MEDICAL ROS    Patient with history of ADHD, here for disease maintenance.  He is seen today for follow up appointment.  Overall doing ok.      No major issues.  Wife is back now.  The medication is working well - no issues with it.  Focus/concentration intact with medication.  Sleep intact - tends to stay up  "too late but no trouble falling asleep.  No weight loss or difficulty with appetite - he estimates weight is about 160lbs.  No cardiac issues (tachycardia, palpitations, or chest pain) or movement issues (tics, tremors, involuntary movements).  Raynaud's symptoms reasonably controlled.  Work is going well.    Son is doing well - "great" - he's very active.  He denies depression, anxiety, SI, HI, AH, VH, PI.  He had COVID earlier this month - feeling better now.      KEY FEATURES OF THE PAST HISTORY (PSYCHIATRIC, MEDICAL, FAMILY, AND/OR SOCIAL)    Initiated treatment with me in March 2009  Diagnosed w/ ADHD in 7th or 8th grade, but problems w/ school as long as he can remember. Took Ritalin through HS - stopped in 2000.      PAST PSYCHOTROPIC TRIALS    Ritalin - helpful with concentration but some mild upset stomach and "zombie like feeling"      CURRENT PSYCHOTROPIC REGIMEN   Adderall 20mg daily + 30mg daily      EXAMINATION:       Vitals:    01/20/22 1639   Height: 5' 9" (1.753 m)       Appearance & Behavior: stated age, casually dressed, adequately groomed  Involuntary Movements: no tics or tremors noted  Speech & Language: conversational, spontaneous  Mood: "fine"  Affect: euthymic  Thought Process & Associations: linear  Thought Content & Perceptions: no SI  Cognition: +distractible but otherwise cognition intact  Insight & Judgment: both intact      CLINICAL RISK ASSESSMENT:       The following risk parameters were assessed during this evaluation:    Suicidal: No  Homicidal: No  Gravely Disabled: No       MEDICAL DECISION MAKING:       DIAGNOSES & PROBLEMS ADDRESSED DURING THE ENCOUNTER  Problem - Complexity - Management Plan    Note: Complexity is documented with a number using the following system:  1=Self-limited or minor problem  2=Acute, uncomplicated illness or injury  3=Stable chronic illness  4=Chronic illness with exacerbation, progression, or side effects of treatment  5=Chronic illness with severe " exacerbation, progression, or side effects of treatment  6=Undiagnosed new problem with uncertain prognosis  7=Acute illness with system symptoms  8=Acute complicated injury  9=Acute or chronic illness or injury that poses a threat to life or bodily function      ADHD - 3 - continue adderall dosing 30/20mg - now two scripts.  No issues with medication, working well.    Hx of palpitations - 3 - an issue in fall 2013 but not currently.  Cardiology cleared him to take stimulants.    Work/relationship issues - 1 - currently no significant issues.  Continue to follow.    Raynaud's Disease - 3 - follows with MD. Gonzalez to monitor.  No current issues.      Shared medical decision making with the patient was employed to the extent possible when selecting, or considering but not selecting, proposed management options.      PRESCRIPTION DRUG MANAGEMENT  - Prescription drug management was employed during the encounter, as medications were prescribed, or considered but not prescribed.   - The risks and benefits of medication were discussed with this patient.  - Possible expectable adverse effects of any current or proposed individual psychotropic agents were discussed with this patient.  - Counseling was provided on the importance of full compliance with any prescribed medication.  - Detailed instructions were provided to the patient regarding the administration of any prescribed medication.    [facility]  Site reviewed -   Prescriptions   Total: 26   Private Pay: 0   Showing 1-15 of 26 Items   View   15 Items     of 2   Filled  Written  Sold  ID  Drug  QTY  Days  Prescriber  RX #  Dispenser  Refill  Daily Dose*  Pymt Type  [facility]     12/06/2021 12/06/2021   1  Dextroamp-Amphetamin 20 Mg Tab   30.00  30  Da Gal  [id number]  Wal ([id number])  0   [place]     12/06/2021 12/06/2021   1  Dextroamp-Amphetamin 30 Mg Tab   30.00  30  Da Gal  [id number]  Wal ([id number])  0   [place]     10/21/2021  10/21/2021   1  Dextroamp-Amphetamin 30 Mg Tab    30.00  30  Da Gal  [id number]  Wal ([id number])  0   [place]     10/21/2021  10/21/2021   1  Dextroamp-Amphetamin 20 Mg Tab   30.00  30  Da Gal  [id number]  Wal ([id number])  0   [place]     08/17/2021 06/09/2021   1  Dextroamp-Amphetamin 20 Mg Tab   30.00  30  Da Gal  [id number]  Wal ([id number])  0   [place]     08/17/2021 06/09/2021   1  Dextroamp-Amphetamin 30 Mg Tab   30.00  30  Da Gal  [id number]  Wal ([id number])  0   [place]     07/18/2021 06/09/2021   1  Dextroamp-Amphetamin 20 Mg Tab   30.00  30  Da Gal  [id number]  Wal ([id number])  0   [place]     07/18/2021 06/09/2021   1  Dextroamp-Amphetamin 30 Mg Tab   30.00  30  Da Gal  [id number]  Wal ([id number])  0   [place]     06/09/2021 06/09/2021   1  Dextroamp-Amphetamin 30 Mg Tab   30.00  30  Da Gal  [id number]  Wal ([id number])  0   [place]     06/09/2021 06/09/2021   1  Dextroamp-Amphetamin 20 Mg Tab   30.00  30  Da Gal  [id number]  Wal ([id number])  0   [place]     04/27/2021 04/27/2021   1  Dextroamp-Amphetamin 20 Mg Tab   30.00  30  Da Gal  [id number]  Wal (1859)  0   [place]     04/26/2021 04/26/2021   1  Dextroamp-Amphetamin 30 Mg Tab   30.00  30  Da Gal  [id number]  Wal ([id number])  0   [place]     03/17/2021 03/17/2021   1  Dextroamp-Amphetamin 30 Mg Tab   30.00  30  Da Gal  [id number]  Wal ([id number])  0   [place]     03/17/2021 03/17/2021   1  Dextroamp-Amphetamin 20 Mg Tab   30.00  30  Da Gal  [id number]  Wal (1859)  0   [place]     01/27/2021 01/27/2021   1  Dextroamp-Amphetamin 30 Mg Tab   30.00  30  Da Gal  [id number]  Wal (1859)  0               Diagnosis or treatment significantly limited by social determinants of health?  N/A      ESTIMATION OF RISK ASSOCIATED WITH ENCOUNTER: moderate  LEVEL OF MEDICAL DECISION MAKING: moderate  TOTAL TIME FOR E/M SERVICES PERFORMED ON THE DATE OF ENCOUNTER: 25 minutes  CPT CODE JUSTIFICATION (1=Level of MDM, 2=Time, 3=Meets criteria for both): 1      DATA  The electronic medical record was reviewed.  Pertinent recent diagnostic  investigations, test results, and/or external documentation are noted below.    Recent Weights/BMI on File:    Wt Readings from Last 5 Encounters:   11/15/21 74.4 kg (164 lb 0.4 oz)   11/05/21 74.4 kg (164 lb 0.4 oz)   09/29/21 74.8 kg (165 lb)   06/09/21 74.8 kg (165 lb)   03/09/21 72.1 kg (158 lb 13.8 oz)       No data recorded    Current body mass index is 24.22 kg/m².      Most Recent BP/HR on File:    BP Readings from Last 1 Encounters:   11/15/21 131/83       Pulse Readings from Last 1 Encounters:   11/15/21 81         Most Recent EKG on File:    Results for orders placed or performed in visit on 11/13/13   SCHEDULED EKG 12-LEAD (to Muse)    Collection Time: 11/13/13  3:58 PM    Narrative    Test Reason : 785.1  Vent. Rate : 063 BPM     Atrial Rate : 063 BPM     P-R Int : 148 ms          QRS Dur : 090 ms      QT Int : 388 ms       P-R-T Axes : 036 049 048 degrees     QTc Int : 397 ms    **Age and gender specific analysis**  Normal sinus rhythm with sinus arrhythmia  Normal ECG  When compared with ECG of 18-JAN-2011 14:20,  No significant change was found  Confirmed by OTIS NG MD ([id number]) on 11/14/2013 4:02:15 PM    Referred By: SAAD TAM           Overread By: OTIS NG MD           PERTINENT LABORATORY RESULTS    Most Recent Electrolytes on File:  (i.e. Na, K, Ca, Phos, Mg, CO2)    Sodium (mmol/L)   Date Value   09/29/2021 137     Potassium (mmol/L)   Date Value   09/29/2021 4.4     Calcium (mg/dL)   Date Value   09/29/2021 9.6     Phosphorus (mg/dL)   Date Value   11/22/2017 3.6     Magnesium (mg/dL)   Date Value   11/22/2017 1.7     CO2 (mmol/L)   Date Value   09/29/2021 23         Most Recent Blood Glucose & HgA1c on File:    Glucose (mg/dL)   Date Value   09/29/2021 85         Most Recent Renal Function Tests on File:  (i.e. Cr, BUN, GFR, Urine Specific Gravity, Urine Protein)    Creatinine (mg/dL)   Date Value   09/29/2021 0.9     BUN (mg/dL)   Date Value   09/29/2021 17     eGFR if   American (mL/min/1.73 m^2)   Date Value   09/29/2021 >60.0     eGFR if non African American (mL/min/1.73 m^2)   Date Value   09/29/2021 >60.0     Specific [place], UA (no units)   Date Value   09/23/2020 1.020     Protein, UA (no units)   Date Value   09/23/2020 Negative         Most Recent Liver Function Tests on File:  (i.e. AST, ALT, Total Bili, Ammonia, Albumin, INR)    AST (U/L)   Date Value   09/29/2021 26     ALT (U/L)   Date Value   09/29/2021 23     Total Bilirubin (mg/dL)   Date Value   09/29/2021 1.1 (H)     Albumin (g/dL)   Date Value   09/29/2021 4.4     INR (no units)   Date Value   10/17/2009 1.1         Most Recent Pancreatic Function Tests on File:  (i.e. Amylase, Lipase)    Amylase (U/L)   Date Value   10/17/2009 45     Lipase (U/L)   Date Value   11/21/2017 25         Most Recent Blood Counts on File:  (i.e. WBC, ANC, RBC, MCV, Platelets)    WBC (K/uL)   Date Value   11/05/2021 5.70     Gran # (ANC) (K/uL)   Date Value   11/05/2021 3.8     Gran % (%)   Date Value   11/05/2021 66.0     RBC (M/uL)   Date Value   11/05/2021 5.10     MCV (fL)   Date Value   11/05/2021 87     Platelets (K/uL)   Date Value   11/05/2021 170         Most Recent Thyroid/Parathyroid Function Tests on File:  (i.e. TSH, Free T4, Total T4, Free T3, Total T3, PTH)    TSH (uIU/mL)   Date Value   09/29/2021 1.715         Most Recent Lipid Panel Results on File:  (i.e. Cholesterol, Triglycerides, LDH, HDL)    Cholesterol (mg/dL)   Date Value   09/29/2021 178     Triglycerides (mg/dL)   Date Value   09/29/2021 70     LDL Cholesterol (mg/dL)   Date Value   09/29/2021 103.0     HDL (mg/dL)   Date Value   09/29/2021 61         Most Recent CPK & Prolactin on File:    No results found for: CPK, PROLACTIN      Most Recent Vitamin Levels on File:  (i.e. Vitamin B12, Folate, Vitamin D)    Vitamin B-12 (pg/mL)   Date Value   02/11/2019 475     Folate (ng/mL)   Date Value   02/11/2019 8.5          Most Recent Infection Disease Panel on  File:  (i.e. Syphilis, Hepatitis C, Hepatitis B, HIV)     RPR (no units)   Date Value   02/14/2019 Non-reactive     Hepatitis B Surface Ag (no units)   Date Value   02/14/2019 Negative     Hep B S Ab (no units)   Date Value   02/14/2019 Negative     Hep B Core Total Ab (no units)   Date Value   02/14/2019 Negative     Hepatitis C Ab (no units)   Date Value   02/14/2019 Negative     HIV 1/2 Ag/Ab (no units)   Date Value   02/14/2019 Negative         Pregnancy Screenings:    No results found for: PREGTESTUR, PREGNANCYTES, HCGQUANT      Lithium & Valproate Levels on File:    No results found for: LITHIUM    No results found for: VALPROATE      Most Recent Carbamazepine & Lamotrigine Levels on File:    No results found for: CBMZ, LAMOTRIGINE      Most Recent Clozapine Level on File:    No results found for: CLOZAPINE, NORCLOZAP, CLOZNORCLOZ      Results on File for Alcohol Screens (Blood, Urine):    No results found for: ALCOHOLMEDIC    No results found for: PCDSOALCOHOL      Results on File for Urine Drug Screens (Benzodiazepines, Barbiturates, Methadone, Opiates, Cocaine, Amphetamines, THC, PCP):    No results found for: PCDSOBENZOD  No results found for: BARBITURATES  No results found for: PCDSCOMETHA  No results found for: OPIATESCREEN  No results found for: COCAINEMETAB  No results found for: AMPHETAMINES  No results found for: MARIJUANATHC  No results found for: PCDSOPHENCYN      Most Recent Results for Buprenorphine Testing:    No results found for: BUPRENORPH, NORBUPRENOR      Results on File for Phosphatidylethanol (PETH):    No results found for: PETH      Results on File for Ethyl Glucuronide (EtG) and Ethyl Sulfate (EtS):    No results found for: THEYLGLUCU, ETHYLSULF      Most Recent Results on File for Alcohol Biomarkers (GGT, CDT):    No results found for: GGT, CDT        RELEVANT NEUROLOGIC IMAGING:  (i.e. CT/MRI brain)      CT HEAD WITHOUT CONTRAST    Results for orders placed during the hospital  encounter of 08/06/14    CT Head Without Contrast    Narrative  Technique: 5-mm axial images of the head were acquired without the administration of contrast.  Sagittal and coronal reformatted images were also obtained.    Comparison: None    Findings:    There is no evidence of acute or recent major vascular territory cerebral infarction, parenchymal hemorrhage, or space-occupying mass.  The ventricular system is within normal limits of size for age without evidence of hydrocephalus.  No extra-axial  abnormal fluid collections.  Osseous structures and extracranial soft tissues are unremarkable.  The visualized paranasal sinuses and mastoid air cells are clear.  IMPRESSION:      No acute intracranial abnormality detected, specifically no CT finding to account for the patient's reported visual disturbance.  Correlation with outside images if available.  ______________________________________    Electronically signed by resident: SAGAR BRANHAM MD  Date:     08/06/14  Time:    10:17          As the supervising and teaching physician, I personally reviewed the images and resident's interpretation and I agree with the findings.          Electronically signed by: Dr. Thomas Horton MD  Date:     08/06/14  Time:    11:39      MRI HEAD WITHOUT CONTRAST    No results found for this or any previous visit.      MRI HEAD WITH AND WITHOUT CONTRAST    No results found for this or any previous visit.

## 2022-02-23 DIAGNOSIS — F90.0 ATTENTION DEFICIT HYPERACTIVITY DISORDER (ADHD), PREDOMINANTLY INATTENTIVE TYPE: ICD-10-CM

## 2022-02-23 RX ORDER — DEXTROAMPHETAMINE SACCHARATE, AMPHETAMINE ASPARTATE, DEXTROAMPHETAMINE SULFATE AND AMPHETAMINE SULFATE 7.5; 7.5; 7.5; 7.5 MG/1; MG/1; MG/1; MG/1
30 TABLET ORAL DAILY
Qty: 30 TABLET | Refills: 0 | Status: SHIPPED | OUTPATIENT
Start: 2022-02-23 | End: 2022-03-25 | Stop reason: SDUPTHER

## 2022-02-23 RX ORDER — DEXTROAMPHETAMINE SACCHARATE, AMPHETAMINE ASPARTATE, DEXTROAMPHETAMINE SULFATE AND AMPHETAMINE SULFATE 5; 5; 5; 5 MG/1; MG/1; MG/1; MG/1
20 TABLET ORAL DAILY
Qty: 30 TABLET | Refills: 0 | Status: SHIPPED | OUTPATIENT
Start: 2022-02-23 | End: 2022-03-25 | Stop reason: SDUPTHER

## 2022-03-25 DIAGNOSIS — F90.0 ATTENTION DEFICIT HYPERACTIVITY DISORDER (ADHD), PREDOMINANTLY INATTENTIVE TYPE: ICD-10-CM

## 2022-03-25 RX ORDER — DEXTROAMPHETAMINE SACCHARATE, AMPHETAMINE ASPARTATE, DEXTROAMPHETAMINE SULFATE AND AMPHETAMINE SULFATE 7.5; 7.5; 7.5; 7.5 MG/1; MG/1; MG/1; MG/1
30 TABLET ORAL DAILY
Qty: 30 TABLET | Refills: 0 | Status: SHIPPED | OUTPATIENT
Start: 2022-03-25 | End: 2022-04-18 | Stop reason: SDUPTHER

## 2022-03-25 RX ORDER — DEXTROAMPHETAMINE SACCHARATE, AMPHETAMINE ASPARTATE, DEXTROAMPHETAMINE SULFATE AND AMPHETAMINE SULFATE 5; 5; 5; 5 MG/1; MG/1; MG/1; MG/1
20 TABLET ORAL DAILY
Qty: 30 TABLET | Refills: 0 | Status: SHIPPED | OUTPATIENT
Start: 2022-03-25 | End: 2022-04-18 | Stop reason: SDUPTHER

## 2022-04-18 NOTE — PROGRESS NOTES
The patient location is: at home in the [facility].    Visit type: audiovisual    Face to Face time with patient: 12 minutes  Total time spent on the encounter: 25 minutes    Each patient to whom he or she provides medical services by telemedicine is:  (1) informed of the relationship between the physician and patient and the respective role of any other health care provider with respect to management of the patient; and (2) notified that he or she may decline to receive medical services by telemedicine and may withdraw from such care at any time.    For Audio Only Telehealth Visits: the reason for the audio only service rather than synchronous audio and video virtual visit was related to technical difficulties or patient preference/necessity.  This service was not originating from a related E/M service provided within the previous 7 days nor will  to an E/M service or procedure within the next 24 hours or my soonest available appointment.  Prevailing standard of care was able to be met in this audio-only visit. Patient verbally consented to receive this service via voice-only telephone call.      ESTABLISHED OUTPATIENT VISIT     DEPARTMENT:  Psychiatry    EXAMINING PRACTITIONER: David Kelly MD  BOARD CERTIFICATION: Psychiatry & Addiction Medicine      HISTORY:       Patient Name: Juan Manuel Hdz  YOB: 1980    CHIEF COMPLAINT   Juan Manuel Hdz is a 41 y.o. male who presents to the clinic for a follow up psychiatric appointment.  See MEDICAL DECISION MAKING below for enumeration and discussion of patient's DIAGNOSES & PROBLEMS      HPI, PSYCHIATRIC ROS & APPLICABLE MEDICAL ROS    Patient with history of ADHD, here for disease maintenance.  He is seen today for follow up appointment.  Overall doing ok.      No major issues.  The medication is working well - no issues with it.  Focus/concentration intact with medication.  Sleep intact - no issues.  No weight loss or  "difficulty with appetite.  No cardiac issues (tachycardia, palpitations, or chest pain) or movement issues (tics, tremors, involuntary movements).  Raynaud's symptoms reasonably controlled - better in the warmer months.  Work is going well.    Wife is back - things going well in relationship.  Son is doing well - he's 3 and is in nursery school.  He denies depression, anxiety, SI, HI, AH, VH, PI.  He had COVID in January - no current issues.      KEY FEATURES OF THE PAST HISTORY (PSYCHIATRIC, MEDICAL, FAMILY, AND/OR SOCIAL)    Initiated treatment with me in March 2009  Diagnosed w/ ADHD in 7th or 8th grade, but problems w/ school as long as he can remember. Took Ritalin through HS - stopped in 2000.      PAST PSYCHOTROPIC TRIALS    Ritalin - helpful with concentration but some mild upset stomach and "zombie like feeling"      CURRENT PSYCHOTROPIC REGIMEN   Adderall 20mg daily + 30mg daily      EXAMINATION:       There were no vitals filed for this visit.    Appearance & Behavior: stated age, casually dressed, adequately groomed  Involuntary Movements: no tics or tremors noted  Speech & Language: conversational, spontaneous  Mood: "fine"  Affect: euthymic, reactive  Thought Process & Associations: linear  Thought Content & Perceptions: no SI  Cognition: +distractible but otherwise cognition intact  Insight & Judgment: both intact      CLINICAL RISK ASSESSMENT:       The following risk parameters were assessed during this evaluation:    Suicidal: No  Homicidal: No  Gravely Disabled: No       MEDICAL DECISION MAKING:       DIAGNOSES & PROBLEMS ADDRESSED DURING THE ENCOUNTER  Problem - Complexity - Management Plan    Note: Complexity is documented with a number using the following system:  1=Self-limited or minor problem  2=Acute, uncomplicated illness or injury  3=Stable chronic illness  4=Chronic illness with exacerbation, progression, or side effects of treatment  5=Chronic illness with severe exacerbation, " progression, or side effects of treatment  6=Undiagnosed new problem with uncertain prognosis  7=Acute illness with system symptoms  8=Acute complicated injury  9=Acute or chronic illness or injury that poses a threat to life or bodily function      ADHD - 3 - continue adderall dosing 30/20mg - now two scripts.  No issues with medication, working well.    Hx of palpitations - 3 - an issue in fall 2013 but not currently.  Cardiology cleared him to take stimulants.    Work/relationship issues - 1 - currently no significant issues.  Continue to follow.    Raynaud's Disease - 3 - follows with MD. Gonzalez to monitor.  No current issues.      Shared medical decision making with the patient was employed to the extent possible when selecting, or considering but not selecting, proposed management options.      PRESCRIPTION DRUG MANAGEMENT  - Prescription drug management was employed during the encounter, as medications were prescribed, or considered but not prescribed.   - The risks and benefits of medication were discussed with this patient.  - Possible expectable adverse effects of any current or proposed individual psychotropic agents were discussed with this patient.  - Counseling was provided on the importance of full compliance with any prescribed medication.  - Detailed instructions were provided to the patient regarding the administration of any prescribed medication.    [facility]  Site reviewed -   Prescriptions   Total: 29   Private Pay: 0   Showing 1-15 of 29 Items   View   15 Items     of 2   Filled  Written  Sold  ID  Drug  QTY  Days  Prescriber  RX #  Dispenser  Refill  Daily Dose*  Pymt Type  [facility]     03/25/2022 03/25/2022   1  Dextroamp-Amphetamin 20 Mg Tab   30.00  30  Da Gal  [id number]  Wal ([id number])  0   [place]     03/25/2022 03/25/2022   1  Dextroamp-Amphetamin 30 Mg Tab   30.00  15  Da Gal  [id number]  Wal ([id number])  0   [place]     02/23/2022 02/23/2022   1  Dextroamp-Amphetamin 20 Mg Tab   30.00  30  Da  Gal  [id number]  Wal ([id number])  0   [place]     02/23/2022 02/23/2022   1  Dextroamp-Amphetamin 30 Mg Tab   30.00  30  Da Gal  [id number]  Wal ([id number])  0   [place]     01/20/2022 01/20/2022   1  Dextroamp-Amphetamin 30 Mg Tab   30.00  30  Da Gal  [id number]  Wal ([id number])  0   [place]     01/20/2022 01/20/2022   1  Dextroamp-Amphetamin 20 Mg Tab   30.00  30  Da Gal  [id number]  Wal ([id number])  0   [place]     12/06/2021 12/06/2021   1  Dextroamp-Amphetamin 30 Mg Tab   30.00  30  Da Gal  [id number]  Wal ([id number])  0   [place]     12/06/2021 12/06/2021   1  Dextroamp-Amphetamin 20 Mg Tab   30.00  30  Da Gal  [id number]  Wal ([id number])  0   [place]     10/21/2021  10/21/2021   1  Dextroamp-Amphetamin 20 Mg Tab   30.00  30  Da Gal  [id number]  Wal ([id number])  0   [place]     10/21/2021  10/21/2021   1  Dextroamp-Amphetamin 30 Mg Tab   30.00  30  Da Gal  [id number]  Wal (1859)  0   [place]     08/17/2021 06/09/2021   1  Dextroamp-Amphetamin 20 Mg Tab   30.00  30  Da Gal  [id number]  Wal (1859)  0   [place]     08/17/2021 06/09/2021   1  Dextroamp-Amphetamin 30 Mg Tab   30.00  30  Da Gal  [id number]  Wal (1859)  0   [place]     07/18/2021 06/09/2021   1  Dextroamp-Amphetamin 20 Mg Tab   30.00  30  Da Gal  [id number]  Wal ([id number])  0   [place]     07/18/2021 06/09/2021   1  Dextroamp-Amphetamin 30 Mg Tab   30.00  30  Da Gal  [id number]  Wal (1859)  0   [place]     06/09/2021 06/09/2021   1  Dextroamp-Amphetamin 30 Mg Tab   30.00  30  Da Gal  [id number]  Wal (1859)  0   [place]          Diagnosis or treatment significantly limited by social determinants of health?  N/A      ESTIMATION OF RISK ASSOCIATED WITH ENCOUNTER: moderate  LEVEL OF MEDICAL DECISION MAKING: moderate  TOTAL TIME FOR E/M SERVICES PERFORMED ON THE DATE OF ENCOUNTER: 25 minutes  CPT CODE JUSTIFICATION (1=Level of MDM, 2=Time, 3=Meets criteria for both): 1      DATA  The electronic medical record was reviewed.  Pertinent recent diagnostic  investigations, test results, and/or external documentation are noted below.    Recent Weights/BMI on File:    Wt Readings from Last 5 Encounters:   11/15/21 74.4 kg (164 lb 0.4 oz)   11/05/21 74.4 kg (164 lb 0.4 oz)   09/29/21 74.8 kg (165 lb)   06/09/21 74.8 kg (165 lb)   03/09/21 72.1 kg (158 lb 13.8 oz)       No data recorded    Current body mass index is unknown because there is no height or weight on file.      Most Recent BP/HR on File:    BP Readings from Last 1 Encounters:   11/15/21 131/83       Pulse Readings from Last 1 Encounters:   11/15/21 81         Most Recent EKG on File:    Results for orders placed or performed in visit on 11/13/13   SCHEDULED EKG 12-LEAD (to Muse)    Collection Time: 11/13/13  3:58 PM    Narrative    Test Reason : 785.1  Vent. Rate : 063 BPM     Atrial Rate : 063 BPM     P-R Int : 148 ms          QRS Dur : 090 ms      QT Int : 388 ms       P-R-T Axes : 036 049 048 degrees     QTc Int : 397 ms    **Age and gender specific analysis**  Normal sinus rhythm with sinus arrhythmia  Normal ECG  When compared with ECG of 18-JAN-2011 14:20,  No significant change was found  Confirmed by OTIS NG MD ([id number]) on 11/14/2013 4:02:15 PM    Referred By: SAAD TAM           Overread By: OTIS NG MD           PERTINENT LABORATORY RESULTS    Most Recent Electrolytes on File:  (i.e. Na, K, Ca, Phos, Mg, CO2)    Sodium (mmol/L)   Date Value   09/29/2021 137     Potassium (mmol/L)   Date Value   09/29/2021 4.4     Calcium (mg/dL)   Date Value   09/29/2021 9.6     Phosphorus (mg/dL)   Date Value   11/22/2017 3.6     Magnesium (mg/dL)   Date Value   11/22/2017 1.7     CO2 (mmol/L)   Date Value   09/29/2021 23         Most Recent Blood Glucose & HgA1c on File:    Glucose (mg/dL)   Date Value   09/29/2021 85         Most Recent Renal Function Tests on File:  (i.e. Cr, BUN, GFR, Urine Specific Gravity, Urine Protein)    Creatinine (mg/dL)   Date Value   09/29/2021 0.9     BUN (mg/dL)   Date  Value   09/29/2021 17     eGFR if African American (mL/min/1.73 m^2)   Date Value   09/29/2021 >60.0     eGFR if non African American (mL/min/1.73 m^2)   Date Value   09/29/2021 >60.0     Specific [place], UA (no units)   Date Value   09/23/2020 1.020     Protein, UA (no units)   Date Value   09/23/2020 Negative         Most Recent Liver Function Tests on File:  (i.e. AST, ALT, Total Bili, Ammonia, Albumin, INR)    AST (U/L)   Date Value   09/29/2021 26     ALT (U/L)   Date Value   09/29/2021 23     Total Bilirubin (mg/dL)   Date Value   09/29/2021 1.1 (H)     Albumin (g/dL)   Date Value   09/29/2021 4.4     INR (no units)   Date Value   10/17/2009 1.1         Most Recent Pancreatic Function Tests on File:  (i.e. Amylase, Lipase)    Amylase (U/L)   Date Value   10/17/2009 45     Lipase (U/L)   Date Value   11/21/2017 25         Most Recent Blood Counts on File:  (i.e. WBC, ANC, RBC, MCV, Platelets)    WBC (K/uL)   Date Value   11/05/2021 5.70     Gran # (ANC) (K/uL)   Date Value   11/05/2021 3.8     Gran % (%)   Date Value   11/05/2021 66.0     RBC (M/uL)   Date Value   11/05/2021 5.10     MCV (fL)   Date Value   11/05/2021 87     Platelets (K/uL)   Date Value   11/05/2021 170         Most Recent Thyroid/Parathyroid Function Tests on File:  (i.e. TSH, Free T4, Total T4, Free T3, Total T3, PTH)    TSH (uIU/mL)   Date Value   09/29/2021 1.715         Most Recent Lipid Panel Results on File:  (i.e. Cholesterol, Triglycerides, LDH, HDL)    Cholesterol (mg/dL)   Date Value   09/29/2021 178     Triglycerides (mg/dL)   Date Value   09/29/2021 70     LDL Cholesterol (mg/dL)   Date Value   09/29/2021 103.0     HDL (mg/dL)   Date Value   09/29/2021 61         Most Recent CPK & Prolactin on File:    No results found for: CPK, PROLACTIN      Most Recent Vitamin Levels on File:  (i.e. Vitamin B12, Folate, Vitamin D)    Vitamin B-12 (pg/mL)   Date Value   02/11/2019 475     Folate (ng/mL)   Date Value   02/11/2019 8.5           Most Recent Infection Disease Panel on File:  (i.e. Syphilis, Hepatitis C, Hepatitis B, HIV)     RPR (no units)   Date Value   02/14/2019 Non-reactive     Hepatitis B Surface Ag (no units)   Date Value   02/14/2019 Negative     Hep B S Ab (no units)   Date Value   02/14/2019 Negative     Hep B Core Total Ab (no units)   Date Value   02/14/2019 Negative     Hepatitis C Ab (no units)   Date Value   02/14/2019 Negative     HIV 1/2 Ag/Ab (no units)   Date Value   02/14/2019 Negative         Pregnancy Screenings:    No results found for: PREGTESTUR, PREGNANCYTES, HCGQUANT      Lithium & Valproate Levels on File:    No results found for: LITHIUM    No results found for: VALPROATE      Most Recent Carbamazepine & Lamotrigine Levels on File:    No results found for: CBMZ, LAMOTRIGINE      Most Recent Clozapine Level on File:    No results found for: CLOZAPINE, NORCLOZAP, CLOZNORCLOZ      Results on File for Alcohol Screens (Blood, Urine):    No results found for: ALCOHOLMEDIC    No results found for: PCDSOALCOHOL      Results on File for Urine Drug Screens (Benzodiazepines, Barbiturates, Methadone, Opiates, Cocaine, Amphetamines, THC, PCP):    No results found for: PCDSOBENZOD  No results found for: BARBITURATES  No results found for: PCDSCOMETHA  No results found for: OPIATESCREEN  No results found for: COCAINEMETAB  No results found for: AMPHETAMINES  No results found for: MARIJUANATHC  No results found for: PCDSOPHENCYN      Most Recent Results for Buprenorphine Testing:    No results found for: BUPRENORPH, NORBUPRENOR      Results on File for Phosphatidylethanol (PETH):    No results found for: PETH      Results on File for Ethyl Glucuronide (EtG) and Ethyl Sulfate (EtS):    No results found for: THEYLGLUCU, ETHYLSULF      Most Recent Results on File for Alcohol Biomarkers (GGT, CDT):    No results found for: GGT, CDT        RELEVANT NEUROLOGIC IMAGING:  (i.e. CT/MRI brain)      CT HEAD WITHOUT CONTRAST    Results  for orders placed during the hospital encounter of 08/06/14    CT Head Without Contrast    Narrative  Technique: 5-mm axial images of the head were acquired without the administration of contrast.  Sagittal and coronal reformatted images were also obtained.    Comparison: None    Findings:    There is no evidence of acute or recent major vascular territory cerebral infarction, parenchymal hemorrhage, or space-occupying mass.  The ventricular system is within normal limits of size for age without evidence of hydrocephalus.  No extra-axial  abnormal fluid collections.  Osseous structures and extracranial soft tissues are unremarkable.  The visualized paranasal sinuses and mastoid air cells are clear.  IMPRESSION:      No acute intracranial abnormality detected, specifically no CT finding to account for the patient's reported visual disturbance.  Correlation with outside images if available.  ______________________________________    Electronically signed by resident: SAGAR BRANHAM MD  Date:     08/06/14  Time:    10:17          As the supervising and teaching physician, I personally reviewed the images and resident's interpretation and I agree with the findings.          Electronically signed by: Dr. Thomas Horton MD  Date:     08/06/14  Time:    11:39      MRI HEAD WITHOUT CONTRAST    No results found for this or any previous visit.      MRI HEAD WITH AND WITHOUT CONTRAST    No results found for this or any previous visit.        Adult ADHD Self-Report Scale 4/20/2022   How often do you have trouble wrapping up the final details of a project once the chanllenging parts have been done? 0

## 2022-04-20 ENCOUNTER — OFFICE VISIT (OUTPATIENT)
Dept: PSYCHIATRY | Facility: CLINIC | Age: 42
End: 2022-04-20
Payer: COMMERCIAL

## 2022-04-20 VITALS — BODY MASS INDEX: 24.22 KG/M2 | HEIGHT: 69 IN

## 2022-04-20 DIAGNOSIS — F90.0 ATTENTION DEFICIT HYPERACTIVITY DISORDER (ADHD), PREDOMINANTLY INATTENTIVE TYPE: Primary | ICD-10-CM

## 2022-04-20 DIAGNOSIS — Z86.79 HISTORY OF PALPITATIONS IN ADULTHOOD: ICD-10-CM

## 2022-04-20 DIAGNOSIS — I73.00 RAYNAUD'S DISEASE WITHOUT GANGRENE: ICD-10-CM

## 2022-04-20 PROCEDURE — 99214 PR OFFICE/OUTPT VISIT, EST, LEVL IV, 30-39 MIN: ICD-10-PCS | Mod: 95,,, | Performed by: PSYCHIATRY & NEUROLOGY

## 2022-04-20 PROCEDURE — 3008F BODY MASS INDEX DOCD: CPT | Mod: CPTII,95,, | Performed by: PSYCHIATRY & NEUROLOGY

## 2022-04-20 PROCEDURE — 3008F PR BODY MASS INDEX (BMI) DOCUMENTED: ICD-10-PCS | Mod: CPTII,95,, | Performed by: PSYCHIATRY & NEUROLOGY

## 2022-04-20 PROCEDURE — 1160F PR REVIEW ALL MEDS BY PRESCRIBER/CLIN PHARMACIST DOCUMENTED: ICD-10-PCS | Mod: CPTII,95,, | Performed by: PSYCHIATRY & NEUROLOGY

## 2022-04-20 PROCEDURE — 99214 OFFICE O/P EST MOD 30 MIN: CPT | Mod: 95,,, | Performed by: PSYCHIATRY & NEUROLOGY

## 2022-04-20 PROCEDURE — 1159F MED LIST DOCD IN RCRD: CPT | Mod: CPTII,95,, | Performed by: PSYCHIATRY & NEUROLOGY

## 2022-04-20 PROCEDURE — 1160F RVW MEDS BY RX/DR IN RCRD: CPT | Mod: CPTII,95,, | Performed by: PSYCHIATRY & NEUROLOGY

## 2022-04-20 PROCEDURE — 1159F PR MEDICATION LIST DOCUMENTED IN MEDICAL RECORD: ICD-10-PCS | Mod: CPTII,95,, | Performed by: PSYCHIATRY & NEUROLOGY

## 2022-04-20 RX ORDER — DEXTROAMPHETAMINE SACCHARATE, AMPHETAMINE ASPARTATE, DEXTROAMPHETAMINE SULFATE AND AMPHETAMINE SULFATE 7.5; 7.5; 7.5; 7.5 MG/1; MG/1; MG/1; MG/1
30 TABLET ORAL DAILY
Qty: 30 TABLET | Refills: 0 | Status: SHIPPED | OUTPATIENT
Start: 2022-04-20 | End: 2022-06-13 | Stop reason: SDUPTHER

## 2022-04-20 RX ORDER — DEXTROAMPHETAMINE SACCHARATE, AMPHETAMINE ASPARTATE, DEXTROAMPHETAMINE SULFATE AND AMPHETAMINE SULFATE 5; 5; 5; 5 MG/1; MG/1; MG/1; MG/1
20 TABLET ORAL DAILY
Qty: 30 TABLET | Refills: 0 | Status: SHIPPED | OUTPATIENT
Start: 2022-04-20 | End: 2022-06-13 | Stop reason: SDUPTHER

## 2022-04-20 NOTE — PATIENT INSTRUCTIONS
AFTER VISIT INSTRUCTIONS    Take medication as prescribed.  If questions or concerns arise, or if experiencing side effects, adverse reactions or worsening symptoms, contact me through the [facility] portal or call [phone] to speak with office staff.  In cases of emergencies, call [phone] or present to the emergency department for immediate assistance.        ADJUSTMENTS TO REGIMEN: None    LAB WORK: None Needed/None Ordered              RESOURCES:    National Prospect of Mental Health: information on mental health medications.  https://www.[facility].nih.gov/health/topics/mental-health-medications/    The National Suicide Prevention Lifeline: 1-119.900.1064.  Provides 24/7, free and confidential support for people in distress, prevention and crisis resources for you or your loved ones, and best practices for professionals.  https://suicidepreventionlifeline.org/         Thank you for allowing me to participate as part of your health care team, and thank you for choosing [facility].    KT WADE MD  Board Certified in Psychiatry & Addiction Medicine

## 2022-05-06 ENCOUNTER — LAB VISIT (OUTPATIENT)
Dept: LAB | Facility: HOSPITAL | Age: 42
End: 2022-05-06
Attending: PODIATRIST
Payer: COMMERCIAL

## 2022-05-06 ENCOUNTER — OFFICE VISIT (OUTPATIENT)
Dept: PODIATRY | Facility: CLINIC | Age: 42
End: 2022-05-06
Payer: COMMERCIAL

## 2022-05-06 VITALS
WEIGHT: 164 LBS | HEART RATE: 60 BPM | BODY MASS INDEX: 24.29 KG/M2 | HEIGHT: 69 IN | SYSTOLIC BLOOD PRESSURE: 115 MMHG | DIASTOLIC BLOOD PRESSURE: 80 MMHG

## 2022-05-06 DIAGNOSIS — B35.1 ONYCHOMYCOSIS DUE TO DERMATOPHYTE: Primary | ICD-10-CM

## 2022-05-06 DIAGNOSIS — B35.1 ONYCHOMYCOSIS DUE TO DERMATOPHYTE: ICD-10-CM

## 2022-05-06 LAB
ALBUMIN SERPL BCP-MCNC: 4.2 G/DL (ref 3.5–5.2)
ALP SERPL-CCNC: 34 U/L (ref 55–135)
ALT SERPL W/O P-5'-P-CCNC: 19 U/L (ref 10–44)
AST SERPL-CCNC: 25 U/L (ref 10–40)
BILIRUB DIRECT SERPL-MCNC: 0.5 MG/DL (ref 0.1–0.3)
BILIRUB SERPL-MCNC: 1.3 MG/DL (ref 0.1–1)
PROT SERPL-MCNC: 6.7 G/DL (ref 6–8.4)

## 2022-05-06 PROCEDURE — 1159F MED LIST DOCD IN RCRD: CPT | Mod: CPTII,S$GLB,, | Performed by: PODIATRIST

## 2022-05-06 PROCEDURE — 80076 HEPATIC FUNCTION PANEL: CPT | Performed by: PODIATRIST

## 2022-05-06 PROCEDURE — 3074F SYST BP LT 130 MM HG: CPT | Mod: CPTII,S$GLB,, | Performed by: PODIATRIST

## 2022-05-06 PROCEDURE — 3074F PR MOST RECENT SYSTOLIC BLOOD PRESSURE < 130 MM HG: ICD-10-PCS | Mod: CPTII,S$GLB,, | Performed by: PODIATRIST

## 2022-05-06 PROCEDURE — 99213 PR OFFICE/OUTPT VISIT, EST, LEVL III, 20-29 MIN: ICD-10-PCS | Mod: S$GLB,,, | Performed by: PODIATRIST

## 2022-05-06 PROCEDURE — 99213 OFFICE O/P EST LOW 20 MIN: CPT | Mod: S$GLB,,, | Performed by: PODIATRIST

## 2022-05-06 PROCEDURE — 99999 PR PBB SHADOW E&M-EST. PATIENT-LVL III: CPT | Mod: PBBFAC,,, | Performed by: PODIATRIST

## 2022-05-06 PROCEDURE — 99999 PR PBB SHADOW E&M-EST. PATIENT-LVL III: ICD-10-PCS | Mod: PBBFAC,,, | Performed by: PODIATRIST

## 2022-05-06 PROCEDURE — 3079F DIAST BP 80-89 MM HG: CPT | Mod: CPTII,S$GLB,, | Performed by: PODIATRIST

## 2022-05-06 PROCEDURE — 36415 COLL VENOUS BLD VENIPUNCTURE: CPT | Performed by: PODIATRIST

## 2022-05-06 PROCEDURE — 3008F PR BODY MASS INDEX (BMI) DOCUMENTED: ICD-10-PCS | Mod: CPTII,S$GLB,, | Performed by: PODIATRIST

## 2022-05-06 PROCEDURE — 1160F RVW MEDS BY RX/DR IN RCRD: CPT | Mod: CPTII,S$GLB,, | Performed by: PODIATRIST

## 2022-05-06 PROCEDURE — 3079F PR MOST RECENT DIASTOLIC BLOOD PRESSURE 80-89 MM HG: ICD-10-PCS | Mod: CPTII,S$GLB,, | Performed by: PODIATRIST

## 2022-05-06 PROCEDURE — 3008F BODY MASS INDEX DOCD: CPT | Mod: CPTII,S$GLB,, | Performed by: PODIATRIST

## 2022-05-06 PROCEDURE — 1159F PR MEDICATION LIST DOCUMENTED IN MEDICAL RECORD: ICD-10-PCS | Mod: CPTII,S$GLB,, | Performed by: PODIATRIST

## 2022-05-06 PROCEDURE — 1160F PR REVIEW ALL MEDS BY PRESCRIBER/CLIN PHARMACIST DOCUMENTED: ICD-10-PCS | Mod: CPTII,S$GLB,, | Performed by: PODIATRIST

## 2022-05-06 RX ORDER — KETOCONAZOLE 20 MG/G
CREAM TOPICAL DAILY
Qty: 60 G | Refills: 6 | Status: SHIPPED | OUTPATIENT
Start: 2022-05-06

## 2022-05-06 RX ORDER — TERBINAFINE HYDROCHLORIDE 250 MG/1
250 TABLET ORAL DAILY
Qty: 30 TABLET | Refills: 1 | Status: SHIPPED | OUTPATIENT
Start: 2022-05-06 | End: 2022-06-05

## 2022-05-06 NOTE — PROGRESS NOTES
HPI:   Juan Manuel Hdz is a 41 y.o. male who returns to clinic for follow up of chronic nail fungus on both big toes. Finished 1 month of Oral Lamisil 5 months ago but never follow up for remaining medication. Here today for further recommendations. Nails only slightly improved after 1 month of oral treatment.     Chief Complaint   Patient presents with    Nail Problem     Mitchel great toe       Past Medical History:   Diagnosis Date    Acute appendicitis 11/21/2017    ADHD (attention deficit hyperactivity disorder)     Allergy     seasonal    AR (allergic rhinitis)     History of psychiatric care     Psychiatric problem     Raynaud's disease without gangrene 2/14/2019    Reactive airway disease     Therapy            Current Outpatient Medications on File Prior to Visit   Medication Sig Dispense Refill    dextroamphetamine-amphetamine (ADDERALL) 20 mg tablet Take 1 tablet (20 mg total) by mouth once daily. 30 tablet 0    dextroamphetamine-amphetamine (ADDERALL) 30 mg Tab Take 1 tablet (30 mg total) by mouth once daily. 30 tablet 0     No current facility-administered medications on file prior to visit.          ALLG:  Review of patient's allergies indicates:  No Known Allergies        Social History     Socioeconomic History    Marital status:    Occupational History    Occupation:      Employer: [employer]   Tobacco Use    Smoking status: Never Smoker    Smokeless tobacco: Never Used   Substance and Sexual Activity    Alcohol use: Yes     Alcohol/week: 0.8 standard drinks     Types: 1 Standard drinks or equivalent per week     Comment: occasionally    Drug use: No   Social History Narrative    Works at [employer] no kids             ROS:    General ROS: negative for - chills, fatigue or fever  Cardiovascular ROS: no chest pain or dyspnea on exertion  Musculoskeletal ROS: negative for - joint pain or joint stiffness   Skin: Negative for rash, Positive for  "nail changes. No itching.       EXAM:   Vitals:    05/06/22 1150   BP: 115/80   Pulse: 60   Weight: 74.4 kg (164 lb 0.4 oz)   Height: 5' 9" (1.753 m)        General:  alert, no distress, cooperative    Vasc:  Pedal pulses present 2+    Neuro Motor:  Light touch and Sharp/dull sensation:  intact to light touch    Derm: No open lesions, lacerations or wounds noted. Nails are thickened, well trimmed but discolored yellow/brown with subungual debris and brittleness to b/l hallux toenails. There is 2mm of clearing at base of each great toenail. Interdigital spaces clean, dry and intact b/l. No erythema noted to b/l foot. Skin texture normal. Pedal hair normal    Msk:  5/5 muscle strength.   Right foot: no gross deformity   Left foot: no gross deformity      ASSESSMENT / PLAN:     I counseled the patient on the patient's conditions, their implications and medical management.       Onychomycosis due to dermatophyte  -     Hepatic function panel; Future; Expected date: 05/06/2022    Other orders  -     ketoconazole (NIZORAL) 2 % cream; Apply topically once daily. Apply to affected toenails for 6-12 months.  Dispense: 60 g; Refill: 6  -     terbinafine HCL (LAMISIL) 250 mg tablet; Take 1 tablet (250 mg total) by mouth once daily.  Dispense: 30 tablet; Refill: 1       Discussed treatment options for fungal toenails including topical home remedies, topical OTC and Rx medications as well as oral Rx medications and laser treatment All pros and cons discussed of each treatment. Patient opted for oral medication for now.      LFT again today. If normal will continue 2 additional months of medication now to complete a full 3 month course for treatment of onychomycosis. Advised patient  that this treatment is 70-80% effective and may require additional treatment later if symptoms are not fully resolved. Patient verbalized understanding. Discussed possibility of liver damage/failure with use of medication. Advised on reporting any " adverse reactions.     Rx Ketoconazole cream to be applied to affected toenails for up to 1 year.    ADDENDUM: LFT normal, sent 1 month supply of Terbinafine 250mg once daily with 1 refill to compete 2 additional months for a total of 3 month treatment.     RTC 4-6 months, sooner PRN

## 2022-06-13 ENCOUNTER — TELEPHONE (OUTPATIENT)
Dept: PSYCHIATRY | Facility: HOSPITAL | Age: 42
End: 2022-06-13
Payer: COMMERCIAL

## 2022-06-13 DIAGNOSIS — F90.0 ATTENTION DEFICIT HYPERACTIVITY DISORDER (ADHD), PREDOMINANTLY INATTENTIVE TYPE: ICD-10-CM

## 2022-06-13 RX ORDER — DEXTROAMPHETAMINE SACCHARATE, AMPHETAMINE ASPARTATE, DEXTROAMPHETAMINE SULFATE AND AMPHETAMINE SULFATE 5; 5; 5; 5 MG/1; MG/1; MG/1; MG/1
20 TABLET ORAL DAILY
Qty: 30 TABLET | Refills: 0 | Status: SHIPPED | OUTPATIENT
Start: 2022-06-13 | End: 2022-07-15 | Stop reason: SDUPTHER

## 2022-06-13 RX ORDER — DEXTROAMPHETAMINE SACCHARATE, AMPHETAMINE ASPARTATE, DEXTROAMPHETAMINE SULFATE AND AMPHETAMINE SULFATE 7.5; 7.5; 7.5; 7.5 MG/1; MG/1; MG/1; MG/1
30 TABLET ORAL DAILY
Qty: 30 TABLET | Refills: 0 | Status: SHIPPED | OUTPATIENT
Start: 2022-06-13 | End: 2022-07-15 | Stop reason: SDUPTHER

## 2022-06-13 NOTE — TELEPHONE ENCOUNTER
----- Message from Xochitl Gonzalez sent at 6/13/2022  2:27 PM CDT -----  Regarding: Refill  Pt is calling requesting a refill of dextroamphetamine-amphetamine (ADDERALL) 20 mg tablet and dextroamphetamine-amphetamine (ADDERALL) 30 mg Tab from [id number] [place] [id number] [address] AT [facility] [address]  Phone: [phone], Fax: [phone].  Last seen on 4/20/22 with no future scheduled appts.    He can be reached at [phone].    Thank you.

## 2022-08-19 DIAGNOSIS — F90.0 ATTENTION DEFICIT HYPERACTIVITY DISORDER (ADHD), PREDOMINANTLY INATTENTIVE TYPE: ICD-10-CM

## 2022-08-19 RX ORDER — DEXTROAMPHETAMINE SACCHARATE, AMPHETAMINE ASPARTATE, DEXTROAMPHETAMINE SULFATE AND AMPHETAMINE SULFATE 7.5; 7.5; 7.5; 7.5 MG/1; MG/1; MG/1; MG/1
30 TABLET ORAL DAILY
Qty: 30 TABLET | Refills: 0 | Status: SHIPPED | OUTPATIENT
Start: 2022-08-19 | End: 2022-08-29 | Stop reason: SDUPTHER

## 2022-08-19 RX ORDER — DEXTROAMPHETAMINE SACCHARATE, AMPHETAMINE ASPARTATE, DEXTROAMPHETAMINE SULFATE AND AMPHETAMINE SULFATE 5; 5; 5; 5 MG/1; MG/1; MG/1; MG/1
20 TABLET ORAL DAILY
Qty: 30 TABLET | Refills: 0 | Status: SHIPPED | OUTPATIENT
Start: 2022-08-19 | End: 2022-11-02

## 2022-08-29 RX ORDER — DEXTROAMPHETAMINE SACCHARATE, AMPHETAMINE ASPARTATE, DEXTROAMPHETAMINE SULFATE AND AMPHETAMINE SULFATE 5; 5; 5; 5 MG/1; MG/1; MG/1; MG/1
20 TABLET ORAL DAILY
Qty: 30 TABLET | Refills: 0 | Status: CANCELLED | OUTPATIENT
Start: 2022-08-29

## 2022-08-29 NOTE — PROGRESS NOTES
[facility]  DEPARTMENT OF PSYCHIATRY    ESTABLISHED OUTPATIENT VISIT     EXAMINING PRACTITIONER: David Kelly MD  BOARD CERTIFICATION: Psychiatry & Addiction Medicine      KEY:     I[]I Y = II[x][]II = Yes / Present / Present Though Denies / Endorses  I[]I N = II[][x]II = No / Absent / Absent Though Endorses / Denies  I[]I U = II[][]II = Unknown / Unable to Assess/Enact / Unwilling to Participate/Provide  I[]I A = II[x][x]II = Ambiguity / Uncertainty of Accuracy Exists  I[]I N/A = Non-Applicable    III[x]III = Denotes an Element in the Medical Decision Making Table    TELE-HEALTH / VIRTUAL VISIT:     The patient location is: at home in the [facility].    Visit type: audiovisual    Face to Face time with patient: 12 minutes  Total time spent on the encounter: 30 minutes    Each patient to whom he or she provides medical services by telemedicine is:  (1) informed of the relationship between the physician and patient and the respective role of any other health care provider with respect to management of the patient; and (2) notified that he or she may decline to receive medical services by telemedicine and may withdraw from such care at any time.    For Audio Only Telehealth Visits: the reason for the audio only service rather than synchronous audio and video virtual visit was related to technical difficulties or patient preference/necessity.  This service was not originating from a related E/M service provided within the previous 7 days nor will  to an E/M service or procedure within the next 24 hours or my soonest available appointment.  Prevailing standard of care was able to be met in this audio-only visit. Patient verbally consented to receive this service via voice-only telephone call.       CHIEF COMPLAINT:     Patient Name: Juan Manuel Hdz  YOB: 1980  MRN: [id number]    Juan Manuel Hdz is a 41 y.o. male who is being seen by me for a follow up  "visit.  Juan Manuel Hdz presents with the chief complaint of: inattention and/or hyperactivity/impulsivity    CHART REVIEW:     Available documentation has been reviewed, and pertinent elements of the chart have been incorporated into this evaluation where appropriate.    The patient's last encounter with me was on: 4/20/2022  The patient's last encounter in the psychiatry department was on: 4/20/2022    PRESENTATION:     HPI, PSYCHIATRIC ROS & APPLICABLE MEDICAL ROS:     II[][x]II sleep disturbance: *could get more - tendency to stay up later than should*  II[][x]II appetite/weight change: *no appetite or weight loss*   II[][x]II fatigue/anergia: *only if doesn't get any sleep*   II[x][]II impairment in focus/concentration: *medication helpful with focus/concentration*     II[][x]II depression: **   II[][x]II anxiety/worry: **   II[][x]II dysregulated mood/behavior: **   II[][x]II manic symptomatology: **   II[][x]II psychosis: *no AH/VH/PI*     II[][x]II cardiac symptoms: *no palpitations, tachycardia, or chest pain/tightness*   II[x][]II abnormal movements: *left eye will twitch last several months*     - no issues are problems with medication other than pharmacy hasn't had the 20mg tabs  - he has been breaking the 30mgs in half and taking 1 tab in AM and 1/2 tab in afternoon  - sometimes skips medication on weekends  - wife is in school - things are going well with wife  - work is fine - no issues  - son Theron is doing great - "lots of energy"  - no significant issues with the Raynaud's - "I just deal with it" - not as bad as it has been in the past  - overall has been healthy      CURRENT PSYCHOTROPIC REGIMEN:     Adderall 30mg daily + 15mg afternoon      HISTORY:     PERTINENT PAST PSYCHIATRIC, MEDICAL, FAMILY & SOCIAL:     Initiated treatment with me in March 2009  Diagnosed w/ ADHD in 7th or 8th grade, but problems w/ school as long as he can remember. Took Ritalin through HS - stopped in " "2000.      PAST PSYCHOTROPIC TRIALS:     Ritalin - helpful with concentration but some mild upset stomach and "zombie like feeling"        ASSESSMENT:     III[x]III  DIAGNOSES AND PROBLEMS:     ADHD  Hx of Palpitations    PLAN:     IMPRESSION AND RECOMMENDATIONS:     MANAGEMENT PLAN, TREATMENT GOALS, THERAPEUTIC TECHNIQUES/APPROACHES & CLINICAL REASONING    Continue Adderall - will adjust prescription so he can take 30mg in AM and 15mg in afternoon.  Continue to monitor for cardiac issues - hx of palpitations in fall of 2013 - none noted currently.  Follows with MD for Raynaud's.  Cardiology has cleared him to take stimulants.    - condition: chronic, stable  - prognosis: good, with continued treatment  - symptoms: adequately controlled  - psychotropic regimen: adjustments as noted above  - response to regimen: good  - adverse effects: well-tolerated, no significant side effects noted  - psychotherapy was provided during the session  - diet/exercise counseled, encouragement provided  - advised to comply with medication fully as prescribed/instructed  - advised to follow with primary care provider for routine health maintenance and management of medical comorbidities    III[x]III  Prescription drug management WAS performed during the encounter.  The patient's ability to understand, participate and engage in a conversation surrounding this was deemed to be: present.    PRESCRIPTION DRUG MANAGEMENT:     Prescription Drug Management entails the following:  [x] The review, recommendation, or consideration without recommendation of medications during the encounter.  [x] Discussion (to the extent possible) with the patient and/or other interested parties of the diagnosis, target symptoms, intended outcomes, and possible benefits and risks of medication, as well as alternatives (including no treatment), if not otherwise known or stated prior.  [x] Discussion (to the extent possible) with the patient and/or other interested " "parties of possible expectable adverse effects of any proposed individual psychotropic agents, as well as the inherent unpredictability of treatment, if not otherwise known or stated prior.  [x] Informed consent was sought from the patient (or a guardian if applicable) after a thorough discussion (to the extent possible) of the aforementioned points outlined above.  [x] The provision of counseling (to the extent possible) to the patient and/or other interested parties on the importance of full compliance with any prescribed medication, if not otherwise known or stated prior.    Information on psychotropic medication can be found at: [employer] [place] [employer]: Information on Mental Health Medications      EXAMINATION:     There were no vitals filed for this visit.    MENTAL STATUS EXAMINATION:     General Appearance & Behavior: stated age, casually dressed  Involuntary Movements and Motor Activity: no tics or tremors observed  Speech & Language: conversational, spontaneous  Mood: "fine"  Affect: euthymic, reactive, friendly  Thought Process & Associations: linear, goal-directed  Thought Content & Perceptions: no SI/AH/VH/PI  Sensorium and Cognition: +distractible  Insight & Judgment: both intact        RISK:     MITIGATION:     Risk Mitigation and Safety Netting: risk mitigation strategies and safety netting are important elements of risk management, and as such WERE employed during this encounter, whenever and wherever feasible, relevant and applicable:      Harm Reduction: harm reduction techniques are used in an effort to mitigate risk from maladaptive behaviors, until cessation of the problematic behaviors can be established.  Any and all opportunities to employ such techniques WERE sought and implemented, whenever and wherever feasible, relevant and applicable:      PRESCRIPTION DRUG MONITORING:     [place]  [place]WARE  Site reviewed - No recent discrepancies or irregularities are noted.  Prescriptions "   Total: 32   Private Pay: 0    08/19/2022 08/19/2022   1  Dextroamp-Amphetamin 30 Mg Tab 30.00  30  Da Gal  [id number]  Wal ([id number])  0   [place]     07/15/2022  07/15/2022   1  Dextroamp-Amphetamin 30 Mg Tab 30.00  30  Da Gal  [id number]  Wal ([id number])  0   [place]     07/15/2022  07/15/2022   1  Dextroamp-Amphetamin 20 Mg Tab 30.00  30  Da Gal  [id number]  Wal ([id number])  0   [place]     06/13/2022 06/13/2022   1  Dextroamp-Amphetamin 30 Mg Tab 30.00  30  Da Gal  [id number]  Wal ([id number])  0   [place]        RISK PARAMETERS:     The following risk parameters were assessed during this evaluation:    I[]I Y  I[x]I N  I[]I U  I[]I A  Suicidal Ideation/Behavior:   I[]I Y  I[x]I N  I[]I U  I[]I A  Homicidal Ideation/Behavior:   I[]I Y  I[x]I N  I[]I U  I[]I A  Violence:   I[]I Y  I[x]I N  I[]I U  I[]I A  Self-Injurious Behavior:     I[]I Y  I[x]I N  I[]I U  I[]I A  Minimization of Symptoms Suspected/Evident:   I[]I Y  I[x]I N  I[]I U  I[]I A  Exaggeration of Symptoms Suspected/Evident:     CLINICAL RISK ASSESSMENT:     The patient was able to satisfactorily contract for safety and was noted to be future oriented.    Currently does not meet or exceed the threshold for psychiatric hospitalization, as the patient can be managed safely and successfully in a less restrictive level of care or the community.    CLINICAL RISK DETERMINATION:     Current risk is judged to be:  I[x]I Low    I[]I Moderate   I[]I High    The following criteria were met for involuntary psychiatric admission:   I[x]I None    I[]I Dangerous to Self    I[]I Dangerous to Others    I[]I Gravely Disabled    III[x]III  A determination of the need for psychiatric hospitalization WAS NOT made during this encounter.       MEDICAL DECISION MAKING:     Shared medical decision making with the patient (and/or guardian/designated decision maker, if applicable) was employed (to the extent possible) when selecting, or considering but not selecting, proposed  treatment and management options.    The patient (and/or guardian/designated decision maker, if applicable) was informed, if not already aware, of the biomedical complications associated with mental illness and/or substance abuse.  Additional psychoeducation pertaining to etiology, natural progression, disease processes, time course, treatment options, risk mitigation strategies, safety netting, and harm reduction techniques was provided, as warranted.    Informed consent was sought from the patient (and/or guardian, if applicable) after a thorough discussion (to the extent possible) of the aforementioned points outlined above.    Ample time was allotted to address questions/concerns of the patient (and/or guardian, if applicable), and any discussion that ensued was conducted in a manner and at a level commensurate with cognitive capacity, cultural background and fund of knowledge.    LEVEL OF MEDICAL DECISION MAKING AND TIME:     Complexity (level) of medical decision making employed in the encounter: MODERATE    The total time for services performed on the date of the encounter: 30 minutes       David Kelly MD  Department of Psychiatry  [facility]      DATA:     DIAGNOSTIC TESTING:     The chart was reviewed for recent diagnostic procedures and investigations, and pertinent results are noted below.    WEIGHTS & VITALS:     Wt Readings from Last 2 Encounters:   05/06/22 74.4 kg (164 lb 0.4 oz)   11/15/21 74.4 kg (164 lb 0.4 oz)     BP Readings from Last 1 Encounters:   05/06/22 115/80     Pulse Readings from Last 1 Encounters:   05/06/22 60        PERTINENT LABORATORY RESULTS:     Blood Counts, Electrolytes & Glucose: (i.e. WBC, ANC, Hemoglobin, Hematocrit, MCV, Platelets)  Lab Results   Component Value Date    WBC 5.70 11/05/2021    GRAN 3.8 11/05/2021    GRAN 66.0 11/05/2021    HGB 15.2 11/05/2021    HCT 44.3 11/05/2021    MCV 87 11/05/2021     11/05/2021     09/29/2021    K 4.4 09/29/2021     CALCIUM 9.6 09/29/2021    PHOS 3.6 11/22/2017    MG 1.7 11/22/2017    CO2 23 09/29/2021    ANIONGAP 11 09/29/2021    GLU 85 09/29/2021       Renal, Liver, Pancreas, Thyroid, Parathyroid, Prolactin, CPK, Lipids & Vitamin Levels: (i.e. Cr, BUN, Anion Gap, GFR, Urine Specific Gravity, Urine Protein, Microalburnin, AST, ALT, GGT, Alk Phos,Total Bili, Total Protein, Albumin, Ammonia, INR, Amylase, Lipase, TSH, Total T3, Total T4, Free T4 PTH, Prolactin, CPK, Cholesterol, Triglycerides, LDH, HDL, Vitamin B12, Folate, Vitamin D)  Lab Results   Component Value Date    CREATININE 0.9 09/29/2021    BUN 17 09/29/2021    SPECGRAV 1.020 09/23/2020    PROTEINUA Negative 09/23/2020    AST 25 05/06/2022    ALT 19 05/06/2022    ALKPHOS 34 (L) 05/06/2022    BILITOT 1.3 (H) 05/06/2022    LABPROT 11.3 10/17/2009    ALBUMIN 4.2 05/06/2022    INR 1.1 10/17/2009    AMYLASE 45 10/17/2009    LIPASE 25 11/21/2017    TSH 1.715 09/29/2021    CHOL 178 09/29/2021    TRIG 70 09/29/2021    LDLCALC 103.0 09/29/2021    HDL 61 09/29/2021    [id number] 475 02/11/2019    FOLATE 8.5 02/11/2019       Infection Diseases, Pregnancy Screenings & Drug Levels: (i.e. Hepatitis Panel, HIV, Syphilis, Urine & Blood Pregnancy Screens, beta hCG, Lithium, Valproic Acid, Carbamazepine, Lamotrigine, Phenytoin, Phenobarbital, Clozapine, Norclozapine, Clozapine + Norclozapine)   Lab Results   Component Value Date    HEPAIGM Negative 10/16/2009    HEPBIGM Negative 10/16/2009    HEPBCAB Negative 02/14/2019    HEPCAB Negative 02/14/2019    HIV1X2 Negative 05/15/2012    [id number] Negative 02/14/2019    RPR Non-reactive 02/14/2019       Addiction: (i.e. Urine Toxicology, Blood Alcohol, PETH, EtG, EtS, CDT, Buprenorphine, Norbuprenorphine)  No results found for: PCDSOALCOHOL, PCDSOBENZOD, BARBITURATES, PCDSCOMETHA, OPIATESCREEN, COCAINEMETAB, AMPHETAMINES, MARIJUANATHC, PCDSOPHENCYN, PCDSUBUPRE, PCDSUFENTANY, PCDSUOXYCOD, PCDSUTRAMA, [id number], PETH, ALCOHOLMEDIC,  THEYLGLUCU, ETHYLSULF, CDT, BUPRENORPH, NORBUPRENOR    CARDIAC:     Results for orders placed or performed in visit on 11/13/13   SCHEDULED EKG 12-LEAD (to Muse)    Collection Time: 11/13/13  3:58 PM    Narrative    Test Reason : 785.1  Vent. Rate : 063 BPM     Atrial Rate : 063 BPM     P-R Int : 148 ms          QRS Dur : 090 ms      QT Int : 388 ms       P-R-T Axes : 036 049 048 degrees     QTc Int : 397 ms  Normal sinus rhythm with sinus arrhythmia  Normal ECG  When compared with ECG of 18-JAN-2011 14:20,  No significant change was found  Confirmed by OTIS NG MD ([id number] on 11/14/2013 4:02:15 PM    Referred By: SAAD TAM           Overread By: OTIS NG MD

## 2022-08-31 ENCOUNTER — OFFICE VISIT (OUTPATIENT)
Dept: PSYCHIATRY | Facility: CLINIC | Age: 42
End: 2022-08-31
Payer: COMMERCIAL

## 2022-08-31 DIAGNOSIS — F90.0 ATTENTION DEFICIT HYPERACTIVITY DISORDER (ADHD), PREDOMINANTLY INATTENTIVE TYPE: Primary | ICD-10-CM

## 2022-08-31 DIAGNOSIS — Z86.79 HISTORY OF PALPITATIONS IN ADULTHOOD: ICD-10-CM

## 2022-08-31 DIAGNOSIS — I73.00 RAYNAUD'S DISEASE WITHOUT GANGRENE: ICD-10-CM

## 2022-08-31 PROCEDURE — 1160F PR REVIEW ALL MEDS BY PRESCRIBER/CLIN PHARMACIST DOCUMENTED: ICD-10-PCS | Mod: CPTII,95,, | Performed by: PSYCHIATRY & NEUROLOGY

## 2022-08-31 PROCEDURE — 99214 OFFICE O/P EST MOD 30 MIN: CPT | Mod: 95,,, | Performed by: PSYCHIATRY & NEUROLOGY

## 2022-08-31 PROCEDURE — 1159F MED LIST DOCD IN RCRD: CPT | Mod: CPTII,95,, | Performed by: PSYCHIATRY & NEUROLOGY

## 2022-08-31 PROCEDURE — 1160F RVW MEDS BY RX/DR IN RCRD: CPT | Mod: CPTII,95,, | Performed by: PSYCHIATRY & NEUROLOGY

## 2022-08-31 PROCEDURE — 1159F PR MEDICATION LIST DOCUMENTED IN MEDICAL RECORD: ICD-10-PCS | Mod: CPTII,95,, | Performed by: PSYCHIATRY & NEUROLOGY

## 2022-08-31 PROCEDURE — 99214 PR OFFICE/OUTPT VISIT, EST, LEVL IV, 30-39 MIN: ICD-10-PCS | Mod: 95,,, | Performed by: PSYCHIATRY & NEUROLOGY

## 2022-08-31 RX ORDER — DEXTROAMPHETAMINE SACCHARATE, AMPHETAMINE ASPARTATE, DEXTROAMPHETAMINE SULFATE AND AMPHETAMINE SULFATE 7.5; 7.5; 7.5; 7.5 MG/1; MG/1; MG/1; MG/1
TABLET ORAL
Qty: 45 TABLET | Refills: 0 | Status: SHIPPED | OUTPATIENT
Start: 2022-08-31 | End: 2022-11-02 | Stop reason: SDUPTHER

## 2022-08-31 NOTE — PATIENT INSTRUCTIONS
Thank you for allowing me to participate as part of your health care team, and thank you for choosing [facility].    KT WADE MD  Board Certified in Psychiatry & Addiction Medicine        AFTER VISIT INSTRUCTIONS:     [x] Take all medication, from all providers, as prescribed.  [x] If questions or concerns arise, or if experiencing side effects, adverse reactions or worsening symptoms, contact me through the [facility] portal or call [phone] to speak with my office staff.  [x] In cases of emergencies, call 911 or 398, or present to the emergency department for immediate assistance.    ADJUSTMENTS TO YOUR REGIMEN:    - take adderall 30mg (1 tab) in the morning and 15mg (1/2 tab) in the afternoon  - refer to [facility] for a complete list of your medications, and to report any changes or updates to your regimen.    LAB WORK:    - none ordered by me today      RESOURCES:     [employer] Suicide & Crisis Lifeline: 988 or 3-067-536-ZLLP (6267).  Provides 24/7, free and confidential support for people in distress, prevention and crisis resources for you or your loved ones, and best practices for professionals.  Call, text or chat.  [x] https://Gramovox.org    National Action [place] for Suicide Prevention: the National Action [place] for Suicide Prevention (Action [place]) is the nations public-private partnership for suicide prevention, working with more than 250 national partners to advance the.   [x] https://theactionalliance.org         National Strategy for Suicide Prevention & Risk Mitigation:  [x] Fact Sheet:   https://www.[facility].gov/sites/default/files/national-strategy-for-suicide-prevention-factsheet.pdf  [x] Report:   https://www.ncbi.nlm.nih.gov/books/UCI981869/pdf/Bookshelf_NBK109917.pdf    Suicide Prevention Resource Center: The Suicide Prevention Resource Center (SPRC) is the only federally supported resource center devoted to advancing the implementation of the National Strategy for  Suicide Prevention. [facility] is funded by the U.S. Department of Health and Human Services' Substance Abuse and Mental Health Services Administration ([facility]).  [x] https://www.[facility].org  [x] Safety Plan: https://Wirescan/wp-content/uploads/2021/08/Unique-Safety-Plan-8-6-21.pdf  [x] Suicide Risk Curve:  https://Wirescan/wp-content/uploads/2021/08/Tnbjvti-tkmu-azwhu-8-6-21.pdf    [place] Mental Health Advocacy Service: the state agency tasked with protecting the legal rights of people with behavioral health diagnoses.  [x] https://mhas.[place].[facility]    National [place] of Mental Health: a leading source for trustworthy and timely information on mental health medications.  [x] https://www.nimh.nih.gov/health/topics/mental-health-medications    Web MD: a leading source for trustworthy and timely health and medical news and information, including mental health medications.  [x] https://www.1Lay.CoffeeTable    Alcoholics Anonymous (AA): find a meeting near you.  [x] https://www.aa.org    SMI Adviser: resources for individuals and families with serious mental illness.  [x] https://smiadviser.org    National [place] for the Mentally Ill (ARAM): the nation's largest grassroots organization dedicated to building better lives for individuals with mental illness.  [x] https://www.aram.org/Home    U.S. Department of Health and Human Services (HHS): the mission of HHS is to enhance the health and well-being of all Americans, by providing for effective health and human services and by fostering sound, sustained advances in the sciences underlying medicine, public health, and .   [x] https://www.hhs.gov    Substance Abuse and Mental Health Services Administration (SAMHSA): [facility] is the agency within [facility] that leads public health efforts to advance the behavioral health of the nation. SAMHSA's mission is to reduce the impact of substance abuse and mental illness on [place] communities.    [x] https://www.samhsa.gov    National Institutes of Health ([facility]): a part of [facility], [facility] is the largest biomedical research agency in the world.   [x] https://www.nih.gov    National [place] on Drug Abuse (BIANCA): sponsored by the NIH, the mission of BIANCA is to advance science on drug use and addiction and to apply that knowledge to improve individual and public health.  [x] https://bianca.nih.gov    National [place] on Alcohol Abuse and Alcoholism (NIAAA): sponsored by the NIH, the mission of NIAA is to generate and disseminate fundamental knowledge about the effects of alcohol on health and well-being, and apply that knowledge to improve diagnosis, prevention, and treatment of alcohol-related problems, including alcohol use disorder, across the lifespan.   [x] https://www.niaaa.nih.gov    National Harm Reduction Coalition: resources for harm reduction, including techniques, strategies, policy, and advocacy.  [x] https://harmreduction.org    The SHARE Approach - A Model for Shared Decision Making:  [x] Fact Sheet  https://www.ahrq.gov/sites/default/files/publications/files/share-approach_factsheet.pdf    AMA Principles of Medical Ethics - Informed Consent & Shared Decision Making:  [x] Chapter  https://www.ama-assn.org/system/files/2019-06/code-of-medical-wsflav-rqtgofa-5.pdf    Safety Netting for Primary Care:  [x] Article  https://www.ncbi.nlm.nih.gov/pmc/articles/SIZ5292740/pdf/rtlpjhk-3832--e70.pdf      GENERAL HEALTH & WELLNESS:     [x] Establish and follow regularly with a primary care physician for routine health maintenance and management of any medical comorbidities.  [x] Follow a healthy diet, exercise routinely, and monitor weight and metabolic parameters.  [x] Allow adequate time for sleep and practice good sleep hygiene.  [x] Do not operate a motor vehicle or heavy machinery if the effects of medications or the symptoms underlying your condition impair the ability for you to safely do  so.    Dietary Guidelines for Americans, 2947-6702: issued by the U.S. Department of Agriculture (USDA)  [x] https://www.dietaryguidelines.gov/sites/default/files/2020-12/Dietary_Guidelines_for_Americans_2020-2025.pdf#page=31    ALCOHOL & DRUG USE COUNSELING:     - minimize/avoid alcohol intake and abstain from illicit drug use      MEDICATION MANAGEMENT:     [x] In addition to the potential beneficial effects, the use of any medication or drug (prescribed, over the counter or otherwise) carries with it the risk of potential adverse effects.  Each has a set of typical adverse effects - some common, some rare - but idiosyncratic and unanticipated reactions unique to you are always possible.      [x] It is important to remember that untreated illness can also pose a risk, which must be taken into account when weighing the pros and cons of a medication trial.    [x] Medications and drugs can sometimes interact with each other in the body, leading to adverse effects - it is important that all your practitioners (including me) know all the medications and drugs you take - prescribed, over the counter, or otherwise.  Keep all your practitioners (including me) up to date with any changes.  It's always a good idea to keep an up-to-date list in an easily accessible location.    [x] There is an inherent unpredictability to all treatment, including the use of medication.  Unexpected outcomes can occur - keep me up to date with any difficulties you encounter.    [x] It is important to take medication as directed, and to comply fully with the instructions.  Check with me first before adjusting or stopping your medication on your own.    If you have any further questions or concerns about your care, please contact me through the [facility] portal or call [phone].  I would be happy to provide you additional information pertaining to your diagnosis, intended outcomes and symptoms we are targeting with treatment, and possible  benefits and risks of medication - you can also access this information through the provided resources.  Possible alternatives to the current treatment plan (including no treatment) can also be reviewed.      SHARED DECISION MAKING & INFORMED CONSENT:     Shared medical decision making and informed consent are the hallmark and bedrock of excellent clinical care.  If you or a representative have any additional questions, concerns, or topics to discuss - please contact me through the [facility] portal or call [phone].  Additional psychoeducation pertaining to etiology, natural progression, disease processes, associated biomedical complications, time course, treatment options, benefits and potential adverse effects of psychotropic medication, risk mitigation strategies, safety netting, harm reduction techniques, shared medical decision making, and informed consent is available via the resource list provided.      RISK MITIGATION STRATEGIES:     Risk mitigation strategies are used to reduce the likelihood of future episodes of suicide, homicide, violence, and/or other problematic behaviors (e.g. self-injurious, risky, addictive, compulsive, impulsive). The following are examples of risk mitigation strategies which you can employ in order to reduce your overall burden of risk.     [x] Treatment of underlying psychopathology driving acute and chronic risk to the extent possible.  [x] Use of self administered rating scales and journaling to assist in risk tracking.  [x] Exploration of protective factors to potentially counterbalance risk.  [x] Identification and avoidance of triggers and situations that increase risk, including excessive alcohol and drug use.  [x] Timely follow up and ongoing treatment of mental health issues moving forward.  [x] Full compliance with medication regimen.  [x] A good working knowledge of your medication regimen, including specific instructions on the administration of the  medications.  [x] Consultation with an appropriate medical provider prior to altering or deviating from these instructions on your own.  [x] Active involvement and participation of family and natural support wherever feasible and possible.  [x] Development and review of coping strategies that can be immediately deployed in times of acute crisis.  [x] Implementation of home safety practices and the removal/reduction of access to lethal means (including, but not limited to, firearms, certain types and quantities of medication, poisons, or other methods you may have contemplated or identified).  [x] Collaborative development of a written safety plan with your treatment team and loved ones that can be immediately referred to in times of acute crisis.  [x] Utilization of a safety contract to engage your treatment team and further assess/manage risk.  [x] A good working knowledge of how to access emergency treatment in times of acute crisis.  [x] Utilization of suicide hotlines number (988) and resources in times of crisis.    If you require further information pertaining to the issues outlined above, please reach out to me through the [facility] portal or call [phone] to discuss.  See resource list for additional material.      SAFETY NETTING:     In healthcare, safety netting refers to the provision of information to help patients or carers identify the need to consult a health care professional if a health concern arises or changes.  The relevance of this advice is most obvious with chronic mental illnesses, as their dynamic nature, with symptoms and signs emerging at different times and in different combinations, makes safety netting particularly important.  Specific safety net advice for you includes the following:    [x] The existence of uncertainty. Mental health diagnoses and conditions contain at least some degree of uncertainty - knowing this, you should feel empowered to reconsult if necessary.  [x] What  exactly to look out for. Given the recognised risk of possible deterioration or the development of complications, you should become familiar with the specific clinical features (including red flags) to look out for.    [x] How exactly to seek further help. You should know how and where to seek further help if needed.  Make a plan in advance and keep it handy.  It's also a good idea to share the plan with your treatment providers and loved ones.  [x] What to expect about time course. Mental health diagnoses and conditions often have an expected time course, which is important information for you to know.  However, if your difficulties do not conform to this time line and concerns arise, do not delay seeking further medical advice.    If you require further information pertaining to the issues outlined above, please reach out to me through the [facility] portal or call [phone] to discuss.  See resource list for additional material.      HARM REDUCTION:     Harm Reduction techniques are used in an effort to reduce negative consequences associated with risky and maladaptive behaviors, until cessation of the problematic behaviors can be established.  Harm reduction is best thought of as a journey and not a destination; it is not an endorsement of problematic behavior, but an acknowledgement and recognition of the step-by-step nature of recovery.      Although commonly employed in working with people who suffer with drug addiction, harm reduction can be more broadly applied to any problematic behavior.    Harm Reduction and Substance Abuse:  [x] Incorporates a spectrum of strategies that includes safer use, managed use, abstinence, meeting people who use drugs where theyre at, and addressing conditions of use along with the use itself.  [x] Accepts, for better or worse, that licit and illicit drug use is part of our world and chooses to work to minimize its harmful effects rather than simply ignore or condemn  them.  [x] Understands drug use as a complex, multi-faceted phenomenon that encompasses a continuum of behaviors from severe use to total abstinence, and acknowledges that some ways of using drugs are clearly safer than others.  [x] Calls for the non-judgmental, non-coercive provision of services and resources to people who use drugs and the communities in which they live in order to assist them in reducing attendant harm.  [x] Affirms people who use drugs themselves as the primary agents of reducing the harms of their drug use and seeks to empower them to share information and support each other in strategies which meet their actual conditions of use.  [x] Does not attempt to minimize or ignore the real and tragic harm and danger that can be associated with illicit drug use.  [x] Meets people where they are, but seeks to not leave them there.  [x] Examples of specific interventions include, but are not limited to, narcan (naloxone), medication assisted treatment, syringe access, overdose prevention, and safer drug use techniques.    Whether or not you struggle with substance abuse, any and all opportunities to employ harm reduction techniques to address difficult to change problematic behaviors should be sought and implemented - whenever and wherever feasible, relevant and applicable. Remember, harm reduction techniques can be applied broadly, and are relevant for a multitude of maladaptive behaviors.     If you require further information pertaining to the issues outlined above, please reach out to me through the [facility] portal or call [phone] to discuss.  See resource list for additional material.      SLEEP HYGIENE:

## 2022-10-25 ENCOUNTER — OFFICE VISIT (OUTPATIENT)
Dept: NEUROLOGY | Facility: CLINIC | Age: 42
End: 2022-10-25
Payer: COMMERCIAL

## 2022-10-25 VITALS
SYSTOLIC BLOOD PRESSURE: 120 MMHG | WEIGHT: 165 LBS | HEART RATE: 92 BPM | DIASTOLIC BLOOD PRESSURE: 84 MMHG | HEIGHT: 69 IN | BODY MASS INDEX: 24.44 KG/M2

## 2022-10-25 DIAGNOSIS — G43.009 MIGRAINE WITHOUT AURA AND WITHOUT STATUS MIGRAINOSUS, NOT INTRACTABLE: Primary | ICD-10-CM

## 2022-10-25 PROCEDURE — 1160F RVW MEDS BY RX/DR IN RCRD: CPT | Mod: CPTII,S$GLB,, | Performed by: PHYSICIAN ASSISTANT

## 2022-10-25 PROCEDURE — 3079F PR MOST RECENT DIASTOLIC BLOOD PRESSURE 80-89 MM HG: ICD-10-PCS | Mod: CPTII,S$GLB,, | Performed by: PHYSICIAN ASSISTANT

## 2022-10-25 PROCEDURE — 99204 OFFICE O/P NEW MOD 45 MIN: CPT | Mod: S$GLB,,, | Performed by: PHYSICIAN ASSISTANT

## 2022-10-25 PROCEDURE — 3074F PR MOST RECENT SYSTOLIC BLOOD PRESSURE < 130 MM HG: ICD-10-PCS | Mod: CPTII,S$GLB,, | Performed by: PHYSICIAN ASSISTANT

## 2022-10-25 PROCEDURE — 1159F PR MEDICATION LIST DOCUMENTED IN MEDICAL RECORD: ICD-10-PCS | Mod: CPTII,S$GLB,, | Performed by: PHYSICIAN ASSISTANT

## 2022-10-25 PROCEDURE — 1159F MED LIST DOCD IN RCRD: CPT | Mod: CPTII,S$GLB,, | Performed by: PHYSICIAN ASSISTANT

## 2022-10-25 PROCEDURE — 99999 PR PBB SHADOW E&M-EST. PATIENT-LVL III: CPT | Mod: PBBFAC,,, | Performed by: PHYSICIAN ASSISTANT

## 2022-10-25 PROCEDURE — 99204 PR OFFICE/OUTPT VISIT, NEW, LEVL IV, 45-59 MIN: ICD-10-PCS | Mod: S$GLB,,, | Performed by: PHYSICIAN ASSISTANT

## 2022-10-25 PROCEDURE — 3079F DIAST BP 80-89 MM HG: CPT | Mod: CPTII,S$GLB,, | Performed by: PHYSICIAN ASSISTANT

## 2022-10-25 PROCEDURE — 1160F PR REVIEW ALL MEDS BY PRESCRIBER/CLIN PHARMACIST DOCUMENTED: ICD-10-PCS | Mod: CPTII,S$GLB,, | Performed by: PHYSICIAN ASSISTANT

## 2022-10-25 PROCEDURE — 99999 PR PBB SHADOW E&M-EST. PATIENT-LVL III: ICD-10-PCS | Mod: PBBFAC,,, | Performed by: PHYSICIAN ASSISTANT

## 2022-10-25 PROCEDURE — 3074F SYST BP LT 130 MM HG: CPT | Mod: CPTII,S$GLB,, | Performed by: PHYSICIAN ASSISTANT
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
## 2022-10-25 NOTE — PATIENT INSTRUCTIONS
Supplements for Migraine:  1. Magnesium Oxide - 400mg by mouth daily  2. Riboflavin (Vitamin B2) - 400mg by mouth daily  3. Coenzyme Q10 - 200mg tablet by mouth daily    - Please download Migraine Devin nadir on phone and begin tracking your headaches     Sleep Hygiene:    1. Avoid watching TV, eating, and discussing emotional issues in bed. The bed should be used for sleep only. If not, we can associate the bed with other activities and it often becomes difficult to fall asleep.  2. Minimize noise, light, and temperature extremes during sleep with ear plugs, window blinds, or an electric blanket or air conditioner. Even the slightest nighttime noises or luminescent lights can disrupt the quality of your sleep. Try to keep your bedroom at a comfortable temperature -- not too hot (above 75 degrees) or too cold (below 54 degrees).  3. Try not to drink fluids after 8 p.m. This may reduce awakenings due to urination.  4. Avoid naps, but if you do nap, make it no more than about 25 minutes about eight hours after you awake. But if you have problems falling asleep, then no naps for you.  5. Do not expose your self to bright light if you need to get up at night. Use a small night-light instead.  6. Nicotine is a stimulant and should be avoided particularly near bedtime and upon night awakenings. Having a smoke before bed, although it may feel relaxing, is actually putting a stimulant into your bloodstream.  7. Caffeine is also a stimulant and is present in coffee (100-200 mg), soda (50-75 mg), tea (50-75 mg), and various over-the-counter medications. Caffeine should be discontinued at least four to six hours before bedtime. If you consume large amounts of caffeine and you cut your self off too quickly, beware; you may get headaches that could keep you awake.  8. Although alcohol is a depressant and may help you fall asleep, the subsequent metabolism that clears it from your body when you are sleeping causes a withdrawal  syndrome. This withdrawal causes awakenings and is often associated with nightmares and sweats.  9. A light snack may be sleep-inducing, but a heavy meal too close to bedtime interferes with sleep. Stay away from protein and stick to carbohydrates or dairy products. Milk contains the amino acid L-tryptophan, which has been shown in research to help people go to sleep. So milk and cookies or crackers (without chocolate) may be useful and taste good as well.

## 2022-11-02 ENCOUNTER — TELEPHONE (OUTPATIENT)
Dept: PSYCHIATRY | Facility: CLINIC | Age: 42
End: 2022-11-02
Payer: COMMERCIAL

## 2022-11-02 DIAGNOSIS — F90.0 ATTENTION DEFICIT HYPERACTIVITY DISORDER (ADHD), PREDOMINANTLY INATTENTIVE TYPE: ICD-10-CM

## 2022-11-02 RX ORDER — DEXTROAMPHETAMINE SACCHARATE, AMPHETAMINE ASPARTATE, DEXTROAMPHETAMINE SULFATE AND AMPHETAMINE SULFATE 7.5; 7.5; 7.5; 7.5 MG/1; MG/1; MG/1; MG/1
TABLET ORAL
Qty: 45 TABLET | Refills: 0 | Status: SHIPPED | OUTPATIENT
Start: 2022-11-02 | End: 2022-12-13

## 2022-11-02 NOTE — TELEPHONE ENCOUNTER
----- Message from Xochitl Gonzalez sent at 11/2/2022  2:40 PM CDT -----  Regarding: Refill  Pt is requesting a refill of dextroamphetamine-amphetamine (ADDERALL) 30 mg Tab from [facility] #[id number] [facility] [address] AT [facility] [address], Phone: [phone], Fax: [phone].  Last seen on 8/31/22 with no future scheduled appts.    He can be reached at [phone].    Thank you.

## 2022-12-13 ENCOUNTER — TELEPHONE (OUTPATIENT)
Dept: PSYCHIATRY | Facility: CLINIC | Age: 42
End: 2022-12-13
Payer: COMMERCIAL

## 2022-12-13 DIAGNOSIS — F90.0 ATTENTION DEFICIT HYPERACTIVITY DISORDER (ADHD), PREDOMINANTLY INATTENTIVE TYPE: ICD-10-CM

## 2022-12-13 RX ORDER — DEXTROAMPHETAMINE SACCHARATE, AMPHETAMINE ASPARTATE, DEXTROAMPHETAMINE SULFATE AND AMPHETAMINE SULFATE 7.5; 7.5; 7.5; 7.5 MG/1; MG/1; MG/1; MG/1
TABLET ORAL
Qty: 45 TABLET | Refills: 0 | Status: SHIPPED | OUTPATIENT
Start: 2022-12-13 | End: 2023-03-14

## 2022-12-13 NOTE — TELEPHONE ENCOUNTER
----- Message from Xochitl Gonzalez sent at 12/13/2022  2:02 PM CST -----  Regarding: Medication  Pt is requesting a refill of dextroamphetamine-amphetamine (ADDERALL) 30 mg Tab from [facility] #[id number] Formerly named [facility] [address] AT [facility] [address], Phone: [phone], Fax: [phone].  Last seen on 8/31/22 with no future scheduled appts.    He can be reached at [phone].    Thank you.

## 2023-03-14 ENCOUNTER — TELEPHONE (OUTPATIENT)
Dept: PSYCHIATRY | Facility: HOSPITAL | Age: 43
End: 2023-03-14
Payer: COMMERCIAL

## 2023-03-14 DIAGNOSIS — F90.0 ATTENTION DEFICIT HYPERACTIVITY DISORDER (ADHD), PREDOMINANTLY INATTENTIVE TYPE: ICD-10-CM

## 2023-03-14 RX ORDER — DEXTROAMPHETAMINE SACCHARATE, AMPHETAMINE ASPARTATE, DEXTROAMPHETAMINE SULFATE AND AMPHETAMINE SULFATE 7.5; 7.5; 7.5; 7.5 MG/1; MG/1; MG/1; MG/1
TABLET ORAL
Qty: 45 TABLET | Refills: 0 | Status: SHIPPED | OUTPATIENT
Start: 2023-03-14 | End: 2023-03-14 | Stop reason: SDUPTHER

## 2023-03-14 NOTE — PROGRESS NOTES
ESTABLISHED VISIT: PSYCHIATRY     ASSESSMENT AND PLAN:     DIAGNOSES & PROBLEMS:  1. Attention deficit hyperactivity disorder (ADHD), predominantly inattentive type    2. Raynaud's disease without gangrene    3. History of palpitations in adulthood        Condition:  [x] Stable (i.e., At/Near Treatment Goal, At/Near Baseline)    [] Mild/Moderate Exacerbation, Progression, and/or Side Effects Noted  [] Severe Exacerbation, Progression, and/or Side Effects Noted    Current risk is judged to be:   I[x]I Low    I[]I Moderate   I[]I High    [] Y  [x] N  I[]I U  I[]I N/A  Danger to Self:   [] Y  [x] N  I[]I U  I[]I N/A  Danger to Others:   [] Y  [x] N  I[]I U  I[]I N/A  Grave Disability:     In Summary:  Status quo - adderall working well for symptom control without notable adverse effects - continue treatment.     Adderall 30mg daily + 15mg afternoon    NOTE: The patient does not currently meet the criteria for psychiatric admission and can be safely and effectively managed in a less restrictive level of care.     - psychotherapy was provided during the session      INTERVAL HISTORY:     Global Subjective Rating: about the same as last session  Global Objective Rating: at baseline    [] Y  [x] N  sleep disturbance: **    [] Y  [x] N  appetite/weight change: **    [x] Y  [] N  fatigue/anergia: *a little tired in the AM past couple days*    [x] Y  [] N  impairment in focus/concentration: *quite notable off adderall*       [] Y  [x] N  depression: **     [] Y  [x] N  anxiety/worry: **     [] Y  [x] N  dysregulated mood/behavior: **   Negative for: moodiness, irritability  [] Y  [x] N  manic symptomatology: **     [] Y  [] N  psychosis: **   Negative for: paranoia, hallucinations    [] Y  [] N  pain: **    [] Y  [x] N  cardiac symptoms: **  Negative for: tachycardia, palpitations, chest pain/tightness  [] Y  [x] N  abnormal movements: **            - no  "major problems intersession  - unable to find adderall for about a month and a half - pharmacy was out - able to finally fill yesterday  - able to get work done at job but it was quite difficult  - Raynaud's recently has been milder - mostly subsided  - work going great  - family doing well, son Theron now 3yo    Per wife  - when off adderall, it was quite notable   - inattentive, scattered   - also impulsive, trouble censoring and filtering himself  - can't help himself from interrupting, even at inopportune times    PERTINENT PAST HISTORY:     Initiated treatment with me in March 2009  Diagnosed w/ ADHD in 7th or 8th grade, but problems w/ school as long as he can remember. Took Ritalin through HS - stopped in 2000.     Past Psychotropic Trials:  Ritalin - helpful with concentration but some mild upset stomach and "zombie like feeling"      EXAMINATION:     Vitals:  There were no vitals taken for this visit.    MENTAL STATUS EXAMINATION:  General Appearance: ** adequately groomed, appropriately dressed, in no apparent distress  Behavior: ** cooperative, under good behavioral control  Involuntary Movements and Motor Activity: ** no abnormal involuntary movements noted, no psychomotor agitation or retardation  Gait and Station: ** ambulates without assistance  Speech and Language: ** conversational, spontaneous, speaks and understands English proficiently  Mood: "fine - good"  Affect: ** reactive, mood-congruent, appropriate to situation and context  Thought Process and Associations: ** linear and goal-directed, with no loosening of associations  Thought Content and Perceptions: ** no suicidal or homicidal ideation, no evidence of psychosis  Sensorium: ** alert and oriented, with clear sensorium  Recent and Remote Memory: ** grossly intact, no significant impairments noted  Attention and Concentration: ** attentive, not readily distractible during session (on adderall)  Fund of Knowledge: ** grossly intact, used " appropriate vocabulary, no significant deficits noted  Insight: ** intact, demonstrates awareness of illness  Judgment: ** intact, behavior is adequate/appropriate given the circumstances       RISK MANAGEMENT:     The following risk parameters were assessed during this evaluation:    I[]I Y  I[x]I N  I[]I U  I[]I A  Suicidal Ideation/Behavior: **   I[]I Y  I[x]I N  I[]I U  I[]I A  Homicidal Ideation/Behavior: **  I[]I Y  I[x]I N  I[]I U  I[]I A  Violence: **  I[]I Y  I[x]I N  I[]I U  I[]I A  Self-Injurious Behavior: **     The patient is deemed to be a reliable and factually accurate historian.    I[]I Y  I[x]I N  I[]I U  I[]I A  I[]I N/A  Minimization of Risk Parameters Suspected/Evident: **  I[]I Y  I[x]I N  I[]I U  I[]I A  I[]I N/A  Exaggeration of Risk Parameters Suspected/Evident: **     The patient was able to satisfactorily contract for safety and was noted to be future oriented.  Protective factors were identified.         David Kelly MD  Department of Psychiatry, [facility] Board Certified, Psychiatry and Addiction Medicine       MEDICAL DECISION MAKING AND TIME:     Complexity (level) of medical decision making employed in the encounter: MODERATE    The total time spent performing E/M services on the date of the encounter: 20 minutes      TELE-HEALTH / VIRTUAL VISIT:     The patient location is: at home in the [facility].    Visit type: audiovisual    Face to Face time with patient: 15 minutes    Each patient to whom he or she provides medical services by telemedicine is:  (1) informed of the relationship between the physician and patient and the respective role of any other health care provider with respect to management of the patient; and (2) notified that he or she may decline to receive medical services by telemedicine and may withdraw from such care at any time.    For Audio Only Telehealth Visits: the reason for the audio only service rather than synchronous audio and video  virtual visit was related to technical difficulties or patient preference/necessity.  This service was not originating from a related E/M service provided within the previous 7 days nor will  to an E/M service or procedure within the next 24 hours or my soonest available appointment.  Prevailing standard of care was able to be met in this audio-only visit. Patient verbally consented to receive this service via voice-only telephone call.     KEY:     I[]I Y = Yes / Present / Endorses  I[]I N = No / Absent / Denies  I[]I U = Unknown / Unable to Assess / Unwilling to Participate  I[]I A = Ambiguity Exists / Accuracy Uncertain  I[]I D = Denial or Minimization is Suspected/Evident  I[]I N/A = Non-Applicable    CHART REVIEW:     Available documentation has been reviewed, and pertinent elements of the chart have been incorporated into this evaluation where appropriate.    The patient's last Epic encounter with me was on: 8/31/2022  The patient's last Epic encounter in the psychiatry department was on: Visit date not found  The patient's first Epic encounter in the psychiatry department was on:      [place]  [place]  Site reviewed - No recent discrepancies or irregularities are noted.  01/17/2023 11/02/2022   1  Dextroamp-Amphetamin 30 Mg Tab 45.00  30  Da Gal  [id number]   Wal ([id number])  0   [place]     11/05/2022 08/19/2022   1  Dextroamp-Amphetamin 20 Mg Tab 30.00  30  Da Gal  [id number]   Wal ([id number])  0   [place]        ADVICE AND COUNSELING:     [x] Diet/exercise counseled, encouragement provided, continue to monitor weight.  [x] Advised not to operate a motor vehicle or heavy machinery if effects of medications or underlying symptoms/condition impair the ability to safely do so.  [x] Advised to comply with medication fully as prescribed/instructed.  [x] Advised to follow with primary care provider for routine health maintenance and management of medical co-morbidities.  [x] In cases of emergencies, advised to  call 911 or [phone], or present to the emergency department for immediate assistance.    Alcohol, Tobacco, and Drug Counseling, as well as resources, has been provided, as warranted.     Shared medical decision making and informed consent are the hallmark and bedrock of good clinical care, and as such have been employed and obtained, respectively, to the degree possible.      Risk Mitigation Strategies, Harm Reduction Techniques, and Safety Netting are important interventions that can reduce acute and chronic risk, and as such have been employed to the degree possible.    Prescription Drug Management entails the review, recommendation, or consideration without recommendation of medications, and as such was employed during the encounter.    Additional Psychoeducation has been provided, as warranted.    Discussed, to the extent possible, diagnosis, risks and benefits of proposed treatment vs alternative treatments vs no treatment, potential side effects of these treatments and the inherent unpredictability of treatment. The patient's ability to understand, participate and engage in a conversation surrounding this was deemed to be: present. The patient expresses understanding of the above and displays the capacity to agree with this treatment given said understanding. Patient also agrees that, currently, the benefits outweigh the risks and would like to continue treatment at this time.     Written material has been provided to supplement, augment, and reinforce any discussions and interventions, via the AVS or other pre-printed handouts.       DIAGNOSTIC TESTING:     The chart was reviewed for recent diagnostic procedures and investigations, and pertinent results are noted below.    Wt Readings from Last 2 Encounters:   10/25/22 74.8 kg (165 lb)   05/06/22 74.4 kg (164 lb 0.4 oz)     BP Readings from Last 1 Encounters:   10/25/22 120/84     Pulse Readings from Last 1 Encounters:   10/25/22 92        Blood Counts,  Electrolytes & Glucose: (i.e. WBC, ANC, Hemoglobin, Hematocrit, MCV, Platelets)  Lab Results   Component Value Date    WBC 5.70 11/05/2021    GRAN 3.8 11/05/2021    GRAN 66.0 11/05/2021    HGB 15.2 11/05/2021    HCT 44.3 11/05/2021    MCV 87 11/05/2021     11/05/2021     09/29/2021    K 4.4 09/29/2021    CALCIUM 9.6 09/29/2021    PHOS 3.6 11/22/2017    MG 1.7 11/22/2017    CO2 23 09/29/2021    ANIONGAP 11 09/29/2021    GLU 85 09/29/2021       Renal, Liver, Pancreas, Thyroid, Parathyroid, Prolactin, CPK, Lipids & Vitamin Levels: (i.e. Cr, BUN, Anion Gap, GFR, Urine Specific Gravity, Urine Protein, Microalburnin, AST, ALT, GGT, Alk Phos,Total Bili, Total Protein, Albumin, Ammonia, INR, Amylase, Lipase, TSH, Total T3, Total T4, Free T4 PTH, Prolactin, CPK, Cholesterol, Triglycerides, LDH, HDL, Vitamin B12, Folate, Vitamin D)  Lab Results   Component Value Date    CREATININE 0.9 09/29/2021    BUN 17 09/29/2021    SPECGRAV 1.020 09/23/2020    PROTEINUA Negative 09/23/2020    AST 25 05/06/2022    ALT 19 05/06/2022    ALKPHOS 34 (L) 05/06/2022    BILITOT 1.3 (H) 05/06/2022    LABPROT 11.3 10/17/2009    ALBUMIN 4.2 05/06/2022    INR 1.1 10/17/2009    AMYLASE 45 10/17/2009    LIPASE 25 11/21/2017    TSH 1.715 09/29/2021    CHOL 178 09/29/2021    TRIG 70 09/29/2021    LDLCALC 103.0 09/29/2021    HDL 61 09/29/2021    KOMHHBPE83 475 02/11/2019    FOLATE 8.5 02/11/2019       Infection Diseases, Pregnancy Screenings & Drug Levels: (i.e. Hepatitis Panel, HIV, Syphilis, Urine & Blood Pregnancy Screens, beta hCG, Lithium, Valproic Acid, Carbamazepine, Lamotrigine, Phenytoin, Phenobarbital, Clozapine, Norclozapine, Clozapine + Norclozapine)   Lab Results   Component Value Date    HEPAIGM Negative 10/16/2009    HEPBIGM Negative 10/16/2009    HEPBCAB Negative 02/14/2019    HEPCAB Negative 02/14/2019    HIV1X2 Negative 05/15/2012    TDA70KPBD Negative 02/14/2019    RPR Non-reactive 02/14/2019       Addiction: (i.e. Urine  Toxicology, Blood Alcohol, PETH, EtG, EtS, CDT, Buprenorphine, Norbuprenorphine)  No results found for: PCDSOALCOHOL, PCDSOBENZOD, BARBITURATES, PCDSCOMETHA, OPIATESCREEN, COCAINEMETAB, AMPHETAMINES, MARIJUANATHC, PCDSOPHENCYN, PCDSUBUPRE, PCDSUFENTANY, PCDSUOXYCOD, PCDSUTRAMA, [id number], PETH, ALCOHOLMEDIC, THEYLGLUCU, ETHYLSULF, CDT, BUPRENORPH, NORBUPRENOR    Results for orders placed or performed in visit on 11/13/13   SCHEDULED EKG 12-LEAD (to Muse)    Collection Time: 11/13/13  3:58 PM    Narrative    Test Reason : 785.1  Vent. Rate : 063 BPM     Atrial Rate : 063 BPM     P-R Int : 148 ms          QRS Dur : 090 ms      QT Int : 388 ms       P-R-T Axes : 036 049 048 degrees     QTc Int : 397 ms    Age and gender specific analysis  Normal sinus rhythm with sinus arrhythmia  Normal ECG  When compared with ECG of 18-JAN-2011 14:20,  No significant change was found  Confirmed by OTIS NG MD ([id number]) on 11/14/2013 4:02:15 PM    Referred By: SAAD TAM           Overread By: OTIS NG MD

## 2023-03-14 NOTE — TELEPHONE ENCOUNTER
----- Message from Xochitl Gonzalez sent at 3/14/2023  2:29 PM CDT -----  Regarding: Medication  Pt is requesting a refill of dextroamphetamine-amphetamine (ADDERALL) 30 mg Tab from [facility] #[id number] [facility] [address] AT [facility] [address], Phone: [phone], Fax: [phone].  Last seen on 8/31/22 and scheduled to be seen on 3/16/23.    He can be reached at [phone].    Thank you.

## 2023-03-16 ENCOUNTER — OFFICE VISIT (OUTPATIENT)
Dept: PSYCHIATRY | Facility: CLINIC | Age: 43
End: 2023-03-16
Payer: COMMERCIAL

## 2023-03-16 DIAGNOSIS — F90.0 ATTENTION DEFICIT HYPERACTIVITY DISORDER (ADHD), PREDOMINANTLY INATTENTIVE TYPE: Primary | ICD-10-CM

## 2023-03-16 DIAGNOSIS — Z86.79 HISTORY OF PALPITATIONS IN ADULTHOOD: ICD-10-CM

## 2023-03-16 DIAGNOSIS — I73.00 RAYNAUD'S DISEASE WITHOUT GANGRENE: ICD-10-CM

## 2023-03-16 PROCEDURE — 1160F PR REVIEW ALL MEDS BY PRESCRIBER/CLIN PHARMACIST DOCUMENTED: ICD-10-PCS | Mod: CPTII,95,, | Performed by: PSYCHIATRY & NEUROLOGY

## 2023-03-16 PROCEDURE — 99214 OFFICE O/P EST MOD 30 MIN: CPT | Mod: 95,,, | Performed by: PSYCHIATRY & NEUROLOGY

## 2023-03-16 PROCEDURE — 1159F MED LIST DOCD IN RCRD: CPT | Mod: CPTII,95,, | Performed by: PSYCHIATRY & NEUROLOGY

## 2023-03-16 PROCEDURE — 1160F RVW MEDS BY RX/DR IN RCRD: CPT | Mod: CPTII,95,, | Performed by: PSYCHIATRY & NEUROLOGY

## 2023-03-16 PROCEDURE — 99214 PR OFFICE/OUTPT VISIT, EST, LEVL IV, 30-39 MIN: ICD-10-PCS | Mod: 95,,, | Performed by: PSYCHIATRY & NEUROLOGY

## 2023-03-16 PROCEDURE — 1159F PR MEDICATION LIST DOCUMENTED IN MEDICAL RECORD: ICD-10-PCS | Mod: CPTII,95,, | Performed by: PSYCHIATRY & NEUROLOGY

## 2023-03-16 RX ORDER — DEXTROAMPHETAMINE SACCHARATE, AMPHETAMINE ASPARTATE, DEXTROAMPHETAMINE SULFATE AND AMPHETAMINE SULFATE 7.5; 7.5; 7.5; 7.5 MG/1; MG/1; MG/1; MG/1
TABLET ORAL
Qty: 45 TABLET | Refills: 0 | Status: SHIPPED | OUTPATIENT
Start: 2023-04-14 | End: 2023-04-14 | Stop reason: SDUPTHER

## 2023-03-16 NOTE — PATIENT INSTRUCTIONS
Thank you for allowing me to participate as part of your health care team, and thank you for choosing [facility].    KT WADE MD  Board Certified in Psychiatry & Addiction Medicine      IN CASE OF SUICIDAL THINKING, call the National Suicide Hotline Number: 988    988 Suicide & Crisis Lifeline: 988 , 8-064-464-TALK (8255)  https://[place].org           AFTER VISIT INSTRUCTIONS:     [x] Take all medication, from all providers, as prescribed.  [x] If questions or concerns arise, or if experiencing side effects, adverse reactions or worsening symptoms, contact me or the appropriate provider through the [facility] portal at [email or web address], or call [phone] to speak with my office staff or [phone] to reach the [facility] main line.  [x] In cases of emergencies, call [phone] or [phone], or present to the emergency department for immediate assistance.      ADJUSTMENTS TO YOUR REGIMEN:    - no adjustments to your medication regimen were made at this encounter  - continue your medication regimen as prescribed    LAB WORK:    - none ordered by me today      Information on mental health medications:    National [place] of Mental Health:   https://www.nimh.nih.gov/health/topics/mental-health-medications     Web MD:   https://www.webmd.com       RESOURCES:     IN CASE OF SUICIDAL THINKING, call the National Suicide Hotline Number: 988    988 Suicide & Crisis Lifeline: 988 , 6-486-214-TALK (8255)  Provides 24/7, free and confidential support for people in distress, prevention and crisis resources for you or your loved ones, and best practices for professionals.    Call, text or chat.  https://[place] Semi.Chromasun     National [employer] [place] [employer] for Suicide Prevention: the National [employer] [place] [employer] for Suicide Prevention ([employer]) is the nations public-private partnership for suicide prevention, working with more than 250 national partners to advance the.   https://theactionalliance.org     National  Strategy for Suicide Prevention & Risk Mitigation:  https://theactionalliance.org/our-strategy/national-strategy-suicide-prevention     [x] Fact Sheet:   https://www.[facility].gov/sites/default/files/national-strategy-for-suicide-prevention-factsheet.pdf     [x] Report:   https://www.ncbi.nlm.nih.gov/books/YUN242638/pdf/Bookshelf_NBK109917.pdf     Suicide Prevention Resource Center: The Suicide Prevention Resource Center (SPR) is the only federally supported resource center devoted to advancing the implementation of the National Strategy for Suicide Prevention. [facility] is funded by the U.S. Department of Health and Human Services' Substance Abuse and Mental Health Services Administration (SAMA).  https://www.[facility].org     [x] Safety Plan:   https://Gigwalk/wp-content/uploads/2021/08/Davion-Eddy-Safety-Plan-8-6-21.pdf     [x] Suicide Risk Curve:  https://Gigwalk/wp-content/uploads/2021/08/Esgystt-fiei-oconz-8-6-21.pdf     [place] Mental Health Advocacy Service: the state agency tasked with protecting the legal rights of people with behavioral health diagnoses.  https://mhas.[place].[facility]     Alcoholics Anonymous (AA): find a meeting near you.  https://www.aa.org     SMI Adviser: resources for individuals and families with serious mental illness.  https://smiadviser.org     National [place] for the Mentally Ill (RONEN): the nation's largest grassroots organization dedicated to building better lives for individuals with mental illness.  https://www.ronen.org/Home     U.S. Department of Health and Human Services (HHS): the mission of HHS is to enhance the health and well-being of all Americans, by providing for effective health and human services and by fostering sound, sustained advances in the sciences underlying medicine, public health, and .   https://www.hhs.gov     Substance Abuse and Mental Health Services Administration (SAMHSA): [facility] is the agency within [facility] that  leads public health efforts to advance the behavioral health of the nation. [facility]'s mission is to reduce the impact of substance abuse and mental illness on [place]'s communities.   https://www.[facility].gov     National Institutes of Health ([facility]): a part of [facility], [facility] is the largest biomedical research agency in the world.   https://www.nih.gov     National [place] on Drug Abuse (BIANCA): sponsored by the NIH, the mission of BIANCA is to advance science on drug use and addiction and to apply that knowledge to improve individual and public health.  https://bianca.nih.gov     National [place] on Alcohol Abuse and Alcoholism (NIAAA): sponsored by the NIH, the mission of NIAA is to generate and disseminate fundamental knowledge about the effects of alcohol on health and well-being, and apply that knowledge to improve diagnosis, prevention, and treatment of alcohol-related problems, including alcohol use disorder, across the lifespan.   https://www.niaaa.nih.gov     National Harm Reduction Coalition: resources for harm reduction, including techniques, strategies, policy, and advocacy.  https://harmreduction.org     The SHARE Approach - A Model for Shared Decision Making:  [x] Fact Sheet  https://www.ahrq.gov/sites/default/files/publications/files/share-approach_factsheet.pdf     AMA Principles of Medical Ethics - Informed Consent & Shared Decision Making:  [x] Chapter  https://www.ama-assn.org/system/files/2019-06/code-of-medical-mrpllk-pzbxfaq-9.pdf     Safety Netting for Primary Care:  [x] Article  https://www.ncbi.nlm.nih.gov/pmc/articles/QJN0425604/pdf/mftraio-7523--e70.pdf       MEDICATION MANAGEMENT:     [x] In addition to the potential beneficial effects, the use of any medication or drug (prescribed, over the counter or otherwise) carries with it the risk of potential adverse effects.  Each has a set of typical adverse effects - some common, some rare - but idiosyncratic and unanticipated reactions unique to you  are always possible.      [x] It is important to remember that untreated illness can also pose a risk, which must be taken into account when weighing the pros and cons of a medication trial.    [x] Medications and drugs can sometimes interact with each other in the body, leading to adverse effects - it is important that all your providers know all the medications and drugs you take - prescribed, over the counter, or otherwise.  Keep me and all your practitioners up to date with any changes.  It's always a good idea to keep an up-to-date list in an easily accessible location.    [x] There is an inherent unpredictability to all treatment, including the use of medication.  Unexpected outcomes can occur - keep me up to date with any difficulties you encounter.    [x] It is important to take medication as directed, and to comply fully with the instructions.  Check with me or the appropriate provider first before adjusting or stopping your medication on your own.    If you have any further questions or concerns about your care, please contact me or your other providers through the [facility] portal at [email or web address], or call [phone] or [phone].  We would be happy to provide you additional information pertaining to your diagnosis, intended outcomes, target symptoms for treatment, and possible benefits and risks of medication - you can also access this information through the provided resources.  Possible alternatives to the current treatment plan (including no treatment) can also be reviewed.      GENERAL HEALTH & WELLNESS:     [x] Establish and follow regularly with a primary care physician for routine health maintenance and management of any medical comorbidities.  [x] Follow a healthy diet, exercise routinely, and monitor weight and metabolic parameters.  [x] Allow adequate time for sleep and practice good sleep hygiene.  [x] Do not operate a motor vehicle or heavy machinery if the effects of  medications or the symptoms underlying your condition impair the ability for you to do so safely.    Dietary Guidelines for Americans, 1699-4471:  U.S. Department of Agriculture (USDA)  https://www.dietaryguidelines.gov/sites/default/files/2020-12/Dietary_Guidelines_for_Americans_2020-2025.pdf#page=31     The Nutrition Source:  [facility]  https://www.[facility] [place].[facility]/nutritionsource       SLEEP HYGIENE:     Follow these tips to establish healthy sleep habits:  [x] Keep a consistent sleep schedule. Get up at the same time every day, even on weekends or during vacations.  [x] Set a bedtime that is early enough for you to get at least 7-8 hours of sleep.  [x] Don't go to bed unless you are sleepy.  [x] If you don't fall asleep after 20 minutes, get out of bed. Go do a quiet activity without a lot of light exposure. It is especially important to not get on electronics.  [x] Establish a relaxing bedtime routine.  [x] Use your bed only for sleep and sex.  [x] Make your bedroom quiet and relaxing. Keep the room at a comfortable, cool temperature.  [x] Limit exposure to bright light in the evenings.  [x] Turn off electronic devices at least 30 minutes before bedtime.  [x] Don't eat a large meal before bedtime. If you are hungry at night, eat a light, healthy snack.  [x] Exercise regularly and maintain a healthy diet.  [x] Avoid consuming caffeine in the afternoon or evening.  [x] Avoid consuming alcohol before bedtime.  [x] Reduce your fluid intake before bedtime.    QUICK TIPS FOR BETTER SLEEP  Reduce smartphone usage Create and maintain a nightly ritual Avoid caffeine 4-6 hours before sleeping Don't eat or drink too much at bedtime Sleep at the same time every night        American Academy of Sleep Medicine - Healthy Sleep Habits:  https://sleepeducation.org/healthy-sleep/healthy-sleep-habits     American Academy of Sleep Medicine - Bedtime  Calculator:  https://sleepeducation.org/healthy-sleep/bedtime-calculator     American Academy of Sleep Medicine - Cognitive Behavioral Therapy for Insomnia (CBT-I):  https://sleepeducation.org/patients/cognitive-behavioral-therapy     American Academy of Sleep Medicine - Insomnia:  https://sleepeducation.org/sleep-disorders/insomnia       ALCOHOL & DRUG USE COUNSELING:     - minimize/avoid alcohol intake and abstain from illicit drug use      Preventing Excessive Alcohol Use (CDC):  https://www.cdc.gov/alcohol/fact-sheets/moderate-drinking.htm#:~:text=To%20reduce%20the%20risk%20of,days%20when%20alcohol%20is%20consumed.     [x] Alcohol consumption is associated with a variety of short- and long-term health risks, including motor vehicle crashes, violence, sexual risk behaviors, high blood pressure, and various cancers (e.g., breast cancer).  [x] The risk of these harms increases with the amount of alcohol you drink. For some conditions, like some cancers, the risk increases even at very low levels of alcohol consumption (less than 1 drink).  [x] To reduce the risk of alcohol-related harms, the 9707-1198 Dietary Guidelines for Americans recommends that adults of legal drinking age can choose not to drink, or to drink in moderation by limiting intake to 2 drinks or less in a day for men or 1 drink or less in a day for women, on days when alcohol is consumed.  [x] The Guidelines also do not recommend that individuals who do not drink alcohol start drinking for any reason and that if adults of legal drinking age choose to drink alcoholic beverages, drinking less is better for health than drinking more.  [x] The Guidelines note that some people should not drink alcohol at all, such as:  - If they are pregnant or might be pregnant.  - If they are younger than age 21.  - If they have certain medical conditions or are taking certain medications that can interact with alcohol.  - If they are recovering from an alcohol use  "disorder or if they are unable to control the amount they drink.  [x] The Guidelines also note that not drinking alcohol is the safest option for women who are lactating. Generally, moderate consumption of alcoholic beverages by a woman who is lactating (up to 1 standard drink in a day) is not known to be harmful to the infant, especially if the woman waits at least 2 hours after a single drink before nursing or expressing breast milk. Women considering consuming alcohol during lactation should talk to their healthcare provider.  [x] The Guidelines note, Emerging evidence suggests that even drinking within the recommended limits may increase the overall risk of death from various causes, such as from several types of cancer and some forms of cardiovascular disease. Alcohol has been found to increase risk for cancer, and for some types of cancer, the risk increases even at low levels of alcohol consumption (less than 1 drink in a day).  [x] Although past studies have indicated that moderate alcohol consumption has protective health benefits (e.g., reducing risk of heart disease), recent studies show this may not be true.  [x] Its important to focus on the amount people drink on the days that they drink. Even if women consume an average of 1 drink per day or men consume an average of 2 drinks per day, binge drinking increases the risk of experiencing alcohol-related harm in the short-term and in the future.    Drinking Levels Defined (NIAAA):  https://www.niaaa.nih.gov/alcohol-health/overview-alcohol-consumption/moderate-binge-drinking     Drinking in Moderation:  According to the "Dietary Guidelines for Americans 7982-5430, U.S. Department of Health and Human Services and U.S. Department of Agriculture, adults of legal drinking age can choose not to drink or to drink in moderation by limiting intake to 2 drinks or less in a day for men and 1 drink or less in a day for women, when alcohol is consumed. Drinking " less is better for health than drinking more.    Binge Drinking:  NIAAA defines binge drinking as a pattern of drinking alcohol that brings blood alcohol concentration (VINCENT) to 0.08 percent - or 0.08 grams of alcohol per deciliter - or higher.  For a typical adult, this pattern corresponds to consuming 5 or more drinks (male), or 4 or more drinks (female), in about 2 hours.    The Substance Abuse and Mental Health Services Administration (SAMHSA), which conducts the annual National Survey on Drug Use and Health (NSDUH), defines binge drinking as 5 or more alcoholic drinks for males or 4 or more alcoholic drinks for females on the same occasion (i.e., at the same time or within a couple of hours of each other) on at least 1 day in the past month.    Heavy Alcohol Use:  NIAAA defines heavy drinking as follows:  - For men, consuming more than 4 drinks on any day or more than 14 drinks per week.  - For women, consuming more than 3 drinks on any day or more than 7 drinks per week.     [facility] defines heavy alcohol use as binge drinking on 5 or more days in the past month.    Patterns of Drinking Associated with Alcohol Use Disorder:  Binge drinking and heavy alcohol use can increase an individual's risk of alcohol use disorder.    Certain people should avoid alcohol completely, including those who:  - Plan to drive or operate machinery, or participate in activities that require skill, coordination, and alertness.  - Take certain over-the-counter or prescription medications.  - Have certain medical conditions.  - Are recovering from alcohol use disorder or are unable to control the amount that they drink.  - Are younger than age 21.  - Are pregnant or may become pregnant.    U.S. Standard Drink  12 oz beer   (5% ABV) 8 oz malt liquor   (7% ABV) 5 oz wine   (12% ABV) 1.5 oz 80-proof distilled spirit  (40% ABV)        Heroin use harm reduction:  1. Carry naloxone. When using heroin, make sure you have at least one dose of  naloxone - the overdose reversal drug - and have it in plain view. Understand how to give it.  2. Try a small dose first. It is best to first try a small amount of the heroin to check the effect.  3. Dont use heroin alone. Always use heroin with someone else and take turns while using.    It is possible to overdose with heroin whether you are snorting, injecting or using it in another form.    Signs of an overdose or emergency:   - The person is awake but unable to talk.  - Their body is limp.  - Their breathing is shallow or slow or stopped.  - Their skin is pale, ashen or clammy/sweaty.  - They are unconscious.    In case of emergency, give naloxone. If you suspect the heroin may contain fentanyl, administer more than one dose. Seek medical help even if naloxone has been given. Call 911 for help.      SHARED DECISION MAKING & INFORMED CONSENT:     Shared medical decision making and informed consent are the hallmark and bedrock of excellent clinical care.  During the encounter, shared medical decision making was employed and informed consent was obtained, to the degree possible, whenever feasible, appropriate and relevant. Those interventions are supplemented here with written materials, detailing the topics in more depth.       PSYCHOEDUCATION:     Psychoeducation pertaining to the following -     Diagnosis Etiology Disease Processes Natural Progression   Treatment Options Time Course Safety Netting Informed Consent   Intended Benefits of Medication Expectable Adverse Effects Target Symptoms for Treatment Alternatives to Current Treatment   Shared   Decision Making Risk Mitigation Strategies Harm Reduction Techniques Associated Bio-Med Complications     - can be further discussed and reviewed (you can also access additional information through the provided resources in this document).    Effective communication is essential in order to engage in shared medical decision making.  If you had difficulty understanding  anything during your encounter or in this supplementary document, please contact your providers through the [facility] portal at [email or web address] or call [phone].     Karen Dictionary  https://dictionary.karen.org/us     It can be easy to miss, forget, or misremember important important information that was discussed during the session - especially when you're stressed, upset, or don't feel well.  If you or a representative have any additional questions, concerns, or topics to discuss - please contact me or your other providers through the [facility] portal at [email or web address], or call [phone] or [phone].      Memory Loss  https://www.Hmizate.ma.[place]point/brain/memory-loss    Causes of Memory Loss  https://www.Teikhos Tech/what-causes-memory-loss-9729763    Memory loss: When to seek help  https://www.[facility].org/diseases-conditions/alzheimers-disease/in-depth/memory-loss/art-82494599    Memory, Forgetfulness, and Aging: What's Normal and What's Not?  https://www.rigoberto.nih.gov/health/memory-forgetfulness-and-aging-whats-normal-and-whats-not    Depression and Memory Loss  https://www.Quettra/health/depression/depression-and-memory-loss    The Relationship Between Anxiety and Memory Loss  https://www.Couchy.com.[facility]/academics/blog-posts/the-relationship-between-anxiety-and-memory-loss     PRESCRIPTION DRUG MANAGEMENT:     Prescription Drug Management entails the following:  [x] The review, recommendation, or consideration without recommendation of medications during the encounter.  [x] Discussion (to the extent possible) with the patient and/or other interested parties of the diagnosis, target symptoms, intended outcomes, and possible benefits and risks of medication, as well as alternatives (including no treatment), if not otherwise known or stated prior.  [x] Discussion (to the extent possible) with the patient and/or other interested parties of possible expectable adverse effects of any  proposed individual psychotropic agents, as well as the inherent unpredictability of treatment, if not otherwise known or stated prior.  [x] Informed consent is sought from the patient (and/or guardian/designated decision maker, if applicable) after a thorough discussion (to the extent possible) of the aforementioned points outlined above.  [x] The provision of counseling (to the extent possible) to the patient and/or other interested parties on the importance of full compliance with any prescribed medication, if not otherwise known or stated prior.    Information on psychotropic medication can be found at:   National [place] of Mental Health: Information on Mental Health Medications      RISK MITIGATION, HARM REDUCTION & SAFETY NETTING:     Risk Mitigation Strategies, Harm Reduction Techniques, and Safety Netting are important interventions that can reduce acute and chronic risk.  As such, opportunities were sought to incorporate psychoeducation and practical advice pertaining to these topics into the encounter, to the degree possible, whenever feasible, appropriate and relevant.  Those interventions are supplemented here with written materials, detailing the topics in more depth.       RISK MITIGATION STRATEGIES:     Risk mitigation strategies are used to reduce the likelihood of future episodes of suicide, homicide, violence, and/or other problematic behaviors (e.g. self-injurious, risky, addictive, compulsive, impulsive). The following are examples of risk mitigation strategies which you can employ in order to reduce your overall burden of risk.     [x] Treatment of underlying psychopathology driving acute and chronic risk to the extent possible.  [x] Use of self administered rating scales and journaling to assist in risk tracking.  [x] Exploration of protective factors to potentially counterbalance risk.  [x] Identification and avoidance of triggers and situations that increase risk, including excessive  alcohol and drug use.  [x] Timely follow up and ongoing treatment of mental health issues moving forward.  [x] Full compliance with medication regimen.  [x] A good working knowledge of your medication regimen, including specific instructions on the administration of the medications.  [x] Consultation with an appropriate medical provider prior to altering or deviating from these instructions on your own.  [x] Active involvement and participation of family and natural support wherever feasible and possible.  [x] Development and review of coping strategies that can be immediately deployed in times of acute crisis.  [x] Implementation of home safety practices and the removal/reduction of access to lethal means (including, but not limited to, firearms, certain types and quantities of medication, poisons, or other methods you may have contemplated or identified).  [x] Collaborative development of a written safety plan with your treatment team and loved ones that can be immediately referred to in times of acute crisis.  [x] Utilization of a safety contract to engage your treatment team and further assess/manage risk.  [x] A good working knowledge of how to access emergency treatment in times of acute crisis.  [x] Utilization of suicide hotlines number (988) and resources in times of crisis.    If you require further information pertaining to the issues outlined above, please reach out to me or your other providers through the [facility] portal at [email or web address], or call [phone] or [phone] to discuss.  See resource list for additional material.      SAFETY NETTING:     In healthcare, safety netting refers to the provision of information to help patients or carers identify the need to consult a health care professional if a health concern arises or changes.  The relevance of this advice is most obvious with chronic mental illnesses, as their dynamic nature, with symptoms and signs emerging at different times  and in different combinations, makes safety netting particularly important.  Specific safety net advice for you includes the following:    [x] The existence of uncertainty. Mental health diagnoses and conditions contain at least some degree of uncertainty - knowing this, you should feel empowered to reconsult if necessary.  [x] What exactly to look out for. Given the recognised risk of possible deterioration or the development of complications, you should become familiar with the specific clinical features (including red flags) to look out for.    [x] How exactly to seek further help. You should know how and where to seek further help if needed.  Make a plan in advance and keep it handy.  It's also a good idea to share the plan with your treatment providers and loved ones.  [x] What to expect about time course. Mental health diagnoses and conditions often have an expected time course, which is important information for you to know.  However, if your difficulties do not conform to this time line and concerns arise, do not delay seeking further medical advice.    If you require further information pertaining to the issues outlined above, please reach out to me or your other providers through the [facility] portal at [email or web address], or call [phone] or [phone] to discuss.  See resource list for additional material.      HARM REDUCTION:     Harm Reduction techniques are used in an effort to reduce negative consequences associated with risky and maladaptive behaviors, until cessation of the problematic behaviors can be established.  Harm reduction is best thought of as a journey and not a destination; it is not an endorsement of problematic behavior, but an acknowledgement and recognition of the step-by-step nature of recovery.      Although commonly employed in working with people who suffer with drug addiction, harm reduction can be more broadly applied to any problematic behavior.    Harm Reduction and  Substance Abuse:  [x] Incorporates a spectrum of strategies that includes safer use, managed use, abstinence, meeting people who use drugs where theyre at, and addressing conditions of use along with the use itself.  [x] Accepts, for better or worse, that licit and illicit drug use is part of our world and chooses to work to minimize its harmful effects rather than simply ignore or condemn them.  [x] Understands drug use as a complex, multi-faceted phenomenon that encompasses a continuum of behaviors from severe use to total abstinence, and acknowledges that some ways of using drugs are clearly safer than others.  [x] Calls for the non-judgmental, non-coercive provision of services and resources to people who use drugs and the communities in which they live in order to assist them in reducing attendant harm.  [x] Affirms people who use drugs themselves as the primary agents of reducing the harms of their drug use and seeks to empower them to share information and support each other in strategies which meet their actual conditions of use.  [x] Does not attempt to minimize or ignore the real and tragic harm and danger that can be associated with illicit drug use.  [x] Meets people where they are, but seeks to not leave them there.  [x] Examples of specific interventions include, but are not limited to, narcan (naloxone), medication assisted treatment, syringe access, overdose prevention, and safer drug use techniques.    Key Harm Reduction Strategies: Opioid Use Disorder  [x] Safe Injection Sites & Equipment  [x] Managed Use  [x] Syringe Exchange Programs  [x] Fentanyl Test Strips  [x] Pharmacotherapy/Medication Assisted Treatment  [x] Narcan  [x] Good Worship Laws  [x] Treatment Instead of intermediate  [x] Diversion Programs  [x] Overdose Education  [x] Abstinence    Whether or not you struggle with substance abuse, any and all opportunities to employ harm reduction techniques to address difficult to change  "problematic behaviors should be sought and implemented - whenever and wherever feasible, relevant and applicable. Additionally, harm reduction techniques can be applied broadly, and are relevant for a multitude of situations - even those that do not involve problematic or maladaptive behaviors.     EXAMPLES OF HARM REDUCTION IN OTHER AREAS  SUN SCREEN SEAT BELTS SPEED LIMITS BIRTH CONTROL        If you require further information pertaining to the issues outlined above, please reach out to me through the [facility] portal or call [phone] to discuss.  See resource list for additional material.      FIREARM SAFETY:     THE SIX BASIC GUN SAFETY RULES  There are six basic gun safety rules for gun owners to understand and practice at all times:  Treat all guns as if they are loaded. Always assume that a gun is loaded even if you think it is unloaded. Every time a gun is handled for any reason, check to see that it is unloaded. If you are unable to check a gun to see if it is unloaded, leave it alone and seek help from someone more knowledgeable about guns.  Keep the gun pointed in the safest possible direction. Always be aware of where a gun is pointing. A "safe direction" is one where an accidental discharge of the gun will not cause injury or damage. Only point a gun at an object you intend to shoot. Never point a gun toward yourself or another person.  Keep your finger off the trigger until you are ready to shoot. Always keep your finger off the trigger and outside the trigger guard until you are ready to shoot. Even though it may be comfortable to rest your finger on the trigger, it also is unsafe. If you are moving around with your finger on the trigger and stumble or fall, you could inadvertently pull the trigger. Sudden loud noises or movements can result in an accidental discharge because there is a natural tendency to tighten the muscles when startled. The trigger is for firing and the handle is for " handling.  Know your target, its surroundings and beyond. Check that the areas in front of and behind your target are safe before shooting. Be aware that if the bullet misses or completely passes through the target, it could strike a person or object. Identify the target and make sure it is what you intend to shoot. If you are in doubt, DON'T SHOOT! Never fire at a target that is only a movement, color, sound or unidentifiable shape. Be aware of all the people around you before you shoot.  Know how to properly operate your gun. It is important to become thoroughly familiar with your gun. You should know its mechanical characteristics including how to properly load, unload and clear a malfunction from your gun. Obviously, not all guns are mechanically the same. Never assume that what applies to one make or model is exactly applicable to another. You should direct questions regarding the operation of your gun to your firearms dealer, or contact the  directly.  Store your gun safely and securely to prevent unauthorized use. Guns and ammunition should be stored separately. When the gun is not in your hands, you must still think of safety. Use an approved firearms safety device on the gun, such as a trigger lock or cable lock, so it cannot be fired. Store it unloaded in a locked container, such as an approved lock box or a gun safe. Store your gun in a different location than the ammunition. For maximum safety you should use both a locking device and a storage container.    ADDITIONAL SAFETY POINTS  The six basic safety rules are the foundational rules for gun safety. However, there are additional safety points that must not be overlooked.  [x] Never handle a gun when you are in an emotional state such as anger or depression. Your judgment may be impaired. If you have acute or chronic suicidal ideation, a suicide plan, or suicidal intent, have firearms removed and your access restricted by a trusted loved one  "or other responsible individual or agency.  [x] Never shoot a gun in celebration (the Fourth of July or New Year's Cha, for example). Not only is this unsafe, but it is generally illegal. A bullet fired into the air will return to the ground with enough speed to cause injury or death.  [x] Do not shoot at water, flat or hard surfaces. The bullet can ricochet and hit someone or something other than the target.  [x] Hand your gun to someone only after you verify that it is unloaded and the cylinder or action is open. Take a gun from someone only after you verify that it is unloaded and the cylinder or action is open.  [x] Guns, alcohol and drugs don't mix. Alcohol and drugs can negatively affect judgment as well as physical coordination. Alcohol and any other substance likely to impair normal mental or physical functions should not be used before or while handling guns. Avoid handling and using your gun when you are taking medications that cause drowsiness or include a warning to not operate machinery while taking this drug.   [x] The loud noise from a fired gun can cause hearing damage, and the debris and hot gas that is often emitted can result in eye injury. Always wear ear and eye protection when shooting a gun.      GUNS AND CHILDREN - FIREARM OWNER RESPONSIBILITIES    You Cannot Be Too Careful with Children and Guns  [x] There is no such thing as being too careful with children and guns. Never assume that simply because a toddler may lack finger strength, they can't pull the trigger. A child's thumb has twice the strength of the other fingers. When a toddler's thumb "pushes" against a trigger, invariably the barrel of the gun is pointing directly at the child's face. NEVER leave a firearm lying around the house.  [x] Child safety precautions still apply even if you have no children or if your children have grown to adulthood and left home. A nephew, niece, neighbor's child or a grandchild may come to visit. " "Practice gun safety at all times.  [x] To prevent injury or death caused by improper storage of guns in a home where children are likely to be present, you should store all guns unloaded, lock them with a firearms safety device and store them in a locked container. Ammunition should be stored in a location separate from the gun.    Talking to Children About Guns  [x] Children are naturally curious about things they don't know about or think are "forbidden." When a child asks questions or begins to act out "gun play," you may want to address his or her curiosity by answering the questions as honestly and openly as possible. This will remove the mystery and reduce the natural curiosity. Also, it is important to remember to talk to children in a manner they can relate to and understand. This is very important, especially when teaching children about the difference between "real" and "make-believe." Let children know that, even though they may look the same, real guns are very different than toy guns. A real gun will hurt or kill someone who is shot.    Instill a Mind Set of Safety and Responsibility  [x] The American Academy of Pediatrics reports that adolescence is a highly vulnerable stage in life for teenagers struggling to develop traits of identity, independence and autonomy. Children, of course, are both naturally curious and innocently unaware of many dangers around them. Thus, adolescents as well as children may not be sufficiently safeguarded by cautionary words, however frequent. Contrary actions can completely undermine good advice. A "Do as I say and not as I do" approach to gun safety is both irresponsible and dangerous.  [x] Remember that actions speak louder than words. Children learn most by observing the adults around them. By practicing safe conduct you will also be teaching safe conduct.    Safety and Storage Devices  [x] If you decide to keep a firearm in your home you must consider the issue of how " to store the firearm in a safe and secure manner. There are a variety of safety and storage devices currently available to the public in a wide range of prices. Some devices are locking mechanisms designed to keep the firearm from being loaded or fired, but don't prevent the firearm from being handled or stolen. There are also locking storage containers that hold the firearm out of sight. For maximum safety you should use both a firearm safety device and a locking storage container to store your unloaded firearm.   Two of the most common locking mechanisms are trigger locks and cable locks. Trigger locks are typically two-piece devices that fit around the trigger and trigger guard to prevent access to the trigger. One side has a post that fits into a hole in the other side. They are locked by a key or combination locking mechanism. Cable locks typically work by looping a strong steel cable through the action of the firearm to block the firearm's operation and prevent accidental firing. However, neither trigger locks nor cable locks are designed to prevent access to the firearm.   [x] Smaller lock boxes and larger gun safes are two of the most common types of locking storage containers. One advantage of lock boxes and gun safes is that they are designed to completely prevent unintended handling and removal of a firearm. Lock boxes are generally constructed of sturdy, high-grade metal opened by either a key or combination lock. Gun safes are quite heavy, usually weighing at least 50 pounds. While gun safes are typically the most expensive firearm storage devices, they are generally more reliable and secure.     Remember: Safety and storage devices are only as secure as the precautions you take to protect the key or combination to the lock.    RULES FOR KIDS  Adults should be aware that a child could discover a gun when a parent or another adult is not present. This could happen in the child's own home; the home of a  neighbor, friend or relative; or in a public place such as a school or park. If this should happen, a child should know the following rules and be taught to practice them.   Stop  The first rule for a child to follow if he/she finds or sees a gun is to stop what he/she is doing.  Don't Touch!  The second rule is for a child not to touch a gun he/she finds or sees. A child may think the best thing to do if he/she finds a gun is to pick it up and take it to an adult. A child needs to know he/she should NEVER touch a gun he/she may find or see.  Leave the Area  The third rule is to immediately leave the area. This would include never taking a gun away from another child or trying to stop someone from using gun.  Tell an Adult  The last rule is for a child to tell an adult about the gun he/she has seen. This includes times when other kids are playing with or shooting a gun.     METHODS OF CHILDPROOFING YOUR FIREARM  As a responsible handgun owner, you must recognize the need and be aware of the methods of childproofing your handgun, whether or not you have children.  Whenever children could be around, whether your own, or a friend's, relative's or neighbor's, additional safety steps should be taken when storing firearms and ammunition in your home.  [x] Always store your firearm unloaded.  [x] Use a firearms safety device AND store the firearm in a locked container.  [x] Store the ammunition separately in a locked container.  Always storing your firearm securely is the best method of childproofing your firearm; however, your choice of a storage place can add another element of safety. Carefully choose the storage place in your home especially if children may be around.  [x] Do not store your firearm where it is visible.  [x] Do not store your firearm in a bedside table, under your mattress or pillow, or on a closet shelf.  [x] Do not store your firearm among your valuables (such as jewelry or cameras) unless it is  locked in a secure container.  [x] Consider storing firearms not possessed for self-defense in a safe and secure manner away from the home.    [employer] for Gun Safety:  https://www.everytown.org       Gun Violence: Prediction, Prevention and Policy  American Psychological Association Panel of Experts Report  https://www.apa.org/pubs/reports/gun-violence-report.pdf       If you require further information pertaining to any of the issues outlined above, please reach out to me or your other providers through the [facility] portal at [email or web address], or call [phone] or [phone] to discuss.  See resource list for additional material.      IN CASE OF SUICIDAL THINKING, call the National Suicide Hotline Number: 988    988 Suicide & Crisis Lifeline: 988 , 8-9487-256-147-TALK (6110)  Provides 24/7, free and confidential support for people in distress, prevention and crisis resources for you or your loved ones, and best practices for professionals.    Call, text or chat.  https://[employer]org

## 2023-05-15 DIAGNOSIS — F90.0 ATTENTION DEFICIT HYPERACTIVITY DISORDER (ADHD), PREDOMINANTLY INATTENTIVE TYPE: ICD-10-CM

## 2023-05-15 RX ORDER — DEXTROAMPHETAMINE SACCHARATE, AMPHETAMINE ASPARTATE, DEXTROAMPHETAMINE SULFATE AND AMPHETAMINE SULFATE 7.5; 7.5; 7.5; 7.5 MG/1; MG/1; MG/1; MG/1
TABLET ORAL
Qty: 45 TABLET | Refills: 0 | Status: SHIPPED | OUTPATIENT
Start: 2023-05-15 | End: 2023-07-17 | Stop reason: SDUPTHER

## 2023-06-19 ENCOUNTER — TELEPHONE (OUTPATIENT)
Dept: PODIATRY | Facility: CLINIC | Age: 43
End: 2023-06-19
Payer: COMMERCIAL

## 2023-06-19 NOTE — TELEPHONE ENCOUNTER
Left voice message for patient to give our office a call back at [phone]. Help with scheduling appointment with Dr. Tom.          Patient is calling because he would like to schedule an appt. Patient states he has tried a few different treatments for his toe nail and is wondering if he could just get the nails removed. Please call and advise patient @[phone]

## 2023-07-17 DIAGNOSIS — F90.0 ATTENTION DEFICIT HYPERACTIVITY DISORDER (ADHD), PREDOMINANTLY INATTENTIVE TYPE: ICD-10-CM

## 2023-07-17 RX ORDER — DEXTROAMPHETAMINE SACCHARATE, AMPHETAMINE ASPARTATE, DEXTROAMPHETAMINE SULFATE AND AMPHETAMINE SULFATE 7.5; 7.5; 7.5; 7.5 MG/1; MG/1; MG/1; MG/1
TABLET ORAL
Qty: 45 TABLET | Refills: 0 | Status: SHIPPED | OUTPATIENT
Start: 2023-07-17 | End: 2023-09-11 | Stop reason: SDUPTHER

## 2023-07-17 RX ORDER — DEXTROAMPHETAMINE SACCHARATE, AMPHETAMINE ASPARTATE, DEXTROAMPHETAMINE SULFATE AND AMPHETAMINE SULFATE 7.5; 7.5; 7.5; 7.5 MG/1; MG/1; MG/1; MG/1
TABLET ORAL
Qty: 45 TABLET | Refills: 0 | OUTPATIENT
Start: 2023-07-17

## 2023-09-11 DIAGNOSIS — F90.0 ATTENTION DEFICIT HYPERACTIVITY DISORDER (ADHD), PREDOMINANTLY INATTENTIVE TYPE: ICD-10-CM

## 2023-09-11 RX ORDER — DEXTROAMPHETAMINE SACCHARATE, AMPHETAMINE ASPARTATE, DEXTROAMPHETAMINE SULFATE AND AMPHETAMINE SULFATE 7.5; 7.5; 7.5; 7.5 MG/1; MG/1; MG/1; MG/1
TABLET ORAL
Qty: 45 TABLET | Refills: 0 | Status: SHIPPED | OUTPATIENT
Start: 2023-09-11 | End: 2023-10-12 | Stop reason: SDUPTHER

## 2023-09-12 ENCOUNTER — TELEPHONE (OUTPATIENT)
Dept: INTERNAL MEDICINE | Facility: CLINIC | Age: 43
End: 2023-09-12
Payer: COMMERCIAL

## 2023-09-12 NOTE — TELEPHONE ENCOUNTER
Attempted to call pt back about symptoms but no response at this time. Left VM to call office when available.

## 2023-09-12 NOTE — TELEPHONE ENCOUNTER
----- Message from Beryl Antonio sent at 9/12/2023  8:01 AM CDT -----  Contact: [phone]  Pt is requesting this through the portal please advise     Preferred Date Range: 9/12/2023 - 9/13/2023    Preferred Times: Any Time    Reason for visit: Felling unusual fatigue, increase flatulences, bloating, stomach feels uncomfortable sometimes, shortness of breath, excrement is thin and narrow. Also, have chronic himroids & Pulp in nose    Comments:  Mentioned in the reasons comment

## 2023-10-10 ENCOUNTER — OFFICE VISIT (OUTPATIENT)
Dept: URGENT CARE | Facility: CLINIC | Age: 43
End: 2023-10-10
Payer: COMMERCIAL

## 2023-10-10 VITALS
SYSTOLIC BLOOD PRESSURE: 120 MMHG | TEMPERATURE: 99 F | DIASTOLIC BLOOD PRESSURE: 80 MMHG | RESPIRATION RATE: 16 BRPM | HEART RATE: 81 BPM | HEIGHT: 69 IN | WEIGHT: 165 LBS | BODY MASS INDEX: 24.44 KG/M2 | OXYGEN SATURATION: 96 %

## 2023-10-10 DIAGNOSIS — J06.9 UPPER RESPIRATORY TRACT INFECTION, UNSPECIFIED TYPE: Primary | ICD-10-CM

## 2023-10-10 LAB
CTP QC/QA: YES
SARS-COV-2 AG RESP QL IA.RAPID: NEGATIVE

## 2023-10-10 PROCEDURE — 87811 SARS CORONAVIRUS 2 ANTIGEN POCT, MANUAL READ: ICD-10-PCS | Mod: QW,S$GLB,, | Performed by: NURSE PRACTITIONER

## 2023-10-10 PROCEDURE — 99213 PR OFFICE/OUTPT VISIT, EST, LEVL III, 20-29 MIN: ICD-10-PCS | Mod: S$GLB,,, | Performed by: NURSE PRACTITIONER

## 2023-10-10 PROCEDURE — 87811 SARS-COV-2 COVID19 W/OPTIC: CPT | Mod: QW,S$GLB,, | Performed by: NURSE PRACTITIONER

## 2023-10-10 PROCEDURE — 99213 OFFICE O/P EST LOW 20 MIN: CPT | Mod: S$GLB,,, | Performed by: NURSE PRACTITIONER

## 2023-10-10 RX ORDER — AZELASTINE 1 MG/ML
1 SPRAY, METERED NASAL 2 TIMES DAILY PRN
Qty: 30 ML | Refills: 0 | Status: SHIPPED | OUTPATIENT
Start: 2023-10-10

## 2023-10-10 RX ORDER — BENZONATATE 100 MG/1
100 CAPSULE ORAL 3 TIMES DAILY PRN
Qty: 30 CAPSULE | Refills: 0 | Status: SHIPPED | OUTPATIENT
Start: 2023-10-10 | End: 2023-10-20

## 2023-10-10 NOTE — PROGRESS NOTES
"Subjective:      Patient ID: Juan Manuel Hdz is a 42 y.o. male.    Vitals:  height is 5' 9" (1.753 m) and weight is 74.8 kg (165 lb). His oral temperature is 98.5 °F (36.9 °C). His blood pressure is 120/80 and his pulse is 81. His respiration is 16 and oxygen saturation is 96%.     Chief Complaint: URI    This is a 42 y.o. male who presents today with a chief complaint of  headaches, sinu s congestion has frequent cough, congestion, and post nasal drip. Symptoms started Sunday .  denies fever, body aches or chills, denies wheezing or shortness of breath, denies nausea, vomiting, diarrhea or abdominal pain, denies chest pain or dizziness positional lightheadedness, denies sore throat or trouble swallowing, denies loss of taste or smell, or any other symptoms          Home Treatment : [facility]       URI   This is a new problem. There has been no fever. Associated symptoms include congestion, coughing, headaches and sneezing. The treatment provided no relief.       HENT:  Positive for congestion.    Respiratory:  Positive for cough.    Allergic/Immunologic: Positive for sneezing.   Neurological:  Positive for headaches.      Objective:     Physical Exam   Constitutional: He is oriented to person, place, and time. He appears well-developed. He is cooperative.  Non-toxic appearance. He does not appear ill. No distress.   HENT:   Head: Normocephalic and atraumatic.   Ears:   Right Ear: Hearing, tympanic membrane, external ear and ear canal normal.   Left Ear: Hearing, tympanic membrane, external ear and ear canal normal.   Nose: Nose normal. No mucosal edema, rhinorrhea or nasal deformity. No epistaxis. Right sinus exhibits no maxillary sinus tenderness and no frontal sinus tenderness. Left sinus exhibits no maxillary sinus tenderness and no frontal sinus tenderness.   Mouth/Throat: Uvula is midline, oropharynx is clear and moist and mucous membranes are normal. No trismus in the jaw. Normal dentition. No uvula " swelling. No oropharyngeal exudate, posterior oropharyngeal edema or posterior oropharyngeal erythema.   Eyes: Conjunctivae and lids are normal. No scleral icterus. Extraocular movement intact vision grossly intact gaze aligned appropriately   Neck: Trachea normal and phonation normal. Neck supple. No edema present. No erythema present. No neck rigidity present.   Cardiovascular: Normal rate, regular rhythm, normal heart sounds and normal pulses.   Pulmonary/Chest: Effort normal and breath sounds normal. No stridor. No respiratory distress. He has no decreased breath sounds. He has no wheezes. He has no rhonchi. He has no rales.   Abdominal: Normal appearance.   Musculoskeletal: Normal range of motion.         General: No deformity. Normal range of motion.   Neurological: He is alert and oriented to person, place, and time. He exhibits normal muscle tone. Coordination normal.   Skin: Skin is warm, dry, intact, not diaphoretic and not pale.   Psychiatric: His speech is normal and behavior is normal. Judgment and thought content normal.   Nursing note and vitals reviewed.    Results for orders placed or performed in visit on 10/10/23   SARS Coronavirus 2 Antigen, POCT Manual Read   Result Value Ref Range    SARS Coronavirus 2 Antigen Negative Negative     Acceptable Yes            Patient in no acute distress.  Vitals reassuring.  Discussed results/diagnosis/plan in depth with patient in clinic. Strict precautions given to patient to monitor for worsening signs and symptoms. Advised to follow up with primary.All questions answered. Strict ER precautions given. If your symptoms worsens or fail to improve you should go to the Emergency Room. Discharge and follow-up instructions given verbally/printed. Discharge and follow-up instructions discussed with the patient who expressed understanding and willingness to comply with my recommendations.Patient voiced understanding and in agreement with current  treatment plan.     Please be advised this text was dictated with Power Content software and may contain errors due to translation.      Assessment:     1. Upper respiratory tract infection, unspecified type        Plan:       Upper respiratory tract infection, unspecified type  -     SARS Coronavirus 2 Antigen, POCT Manual Read    Other orders  -     azelastine (ASTELIN) 137 mcg (0.1 %) nasal spray; 1 spray (137 mcg total) by Nasal route 2 (two) times daily as needed for Rhinitis.  Dispense: 30 mL; Refill: 0  -     benzonatate (TESSALON) 100 MG capsule; Take 1 capsule (100 mg total) by mouth 3 (three) times daily as needed for Cough.  Dispense: 30 capsule; Refill: 0                  Patient Instructions   PLEASE READ YOUR DISCHARGE INSTRUCTIONS ENTIRELY AS IT CONTAINS IMPORTANT INFORMATION.      Please drink plenty of fluids.    Please get plenty of rest.    Please return here or go to the Emergency Department for any concerns or worsening of condition.    Please take an over the counter antihistamine medication (allegra/Claritin/Zyrtec) of your choice as directed.    Try an over the counter decongestant like Mucinex D or Sudafed. You buy this behind the pharmacy counter    If you do have Hypertension or palpitations, it is safe to take Coricidin HBP for relief of sinus symptoms.    If not allergic, please take over the counter Tylenol (Acetaminophen) and/or Motrin (Ibuprofen) as directed for control of pain and/or fever.  Please follow up with your primary care doctor or specialist as needed.    Sore throat recommendations: Warm fluids, warm salt water gargles, throat lozenges, tea, honey, soup, rest, hydration.    Use over the counter flonase: one spray each nostril twice daily OR two sprays each nostril once daily.     If you  smoke, please stop smoking.      Please return or see your primary care doctor if you develop new or worsening symptoms.     Please arrange follow up with your primary medical clinic as soon as  possible. You must understand that you've received an Urgent Care treatment only and that you may be released before all of your medical problems are known or treated. You, the patient, will arrange for follow up as instructed. If your symptoms worsen or fail to improve you should go to the Emergency Room.

## 2023-10-12 ENCOUNTER — OFFICE VISIT (OUTPATIENT)
Dept: PSYCHIATRY | Facility: CLINIC | Age: 43
End: 2023-10-12
Payer: COMMERCIAL

## 2023-10-12 DIAGNOSIS — Z86.79 HISTORY OF PALPITATIONS IN ADULTHOOD: ICD-10-CM

## 2023-10-12 DIAGNOSIS — F90.0 ATTENTION DEFICIT HYPERACTIVITY DISORDER (ADHD), PREDOMINANTLY INATTENTIVE TYPE: Primary | ICD-10-CM

## 2023-10-12 DIAGNOSIS — I73.00 RAYNAUD'S DISEASE WITHOUT GANGRENE: ICD-10-CM

## 2023-10-12 PROCEDURE — 1160F PR REVIEW ALL MEDS BY PRESCRIBER/CLIN PHARMACIST DOCUMENTED: ICD-10-PCS | Mod: CPTII,95,, | Performed by: PSYCHIATRY & NEUROLOGY

## 2023-10-12 PROCEDURE — 1159F PR MEDICATION LIST DOCUMENTED IN MEDICAL RECORD: ICD-10-PCS | Mod: CPTII,95,, | Performed by: PSYCHIATRY & NEUROLOGY

## 2023-10-12 PROCEDURE — 1160F RVW MEDS BY RX/DR IN RCRD: CPT | Mod: CPTII,95,, | Performed by: PSYCHIATRY & NEUROLOGY

## 2023-10-12 PROCEDURE — 1159F MED LIST DOCD IN RCRD: CPT | Mod: CPTII,95,, | Performed by: PSYCHIATRY & NEUROLOGY

## 2023-10-12 PROCEDURE — 99213 PR OFFICE/OUTPT VISIT, EST, LEVL III, 20-29 MIN: ICD-10-PCS | Mod: 95,,, | Performed by: PSYCHIATRY & NEUROLOGY

## 2023-10-12 PROCEDURE — 99213 OFFICE O/P EST LOW 20 MIN: CPT | Mod: 95,,, | Performed by: PSYCHIATRY & NEUROLOGY

## 2023-10-12 RX ORDER — DEXTROAMPHETAMINE SACCHARATE, AMPHETAMINE ASPARTATE, DEXTROAMPHETAMINE SULFATE AND AMPHETAMINE SULFATE 7.5; 7.5; 7.5; 7.5 MG/1; MG/1; MG/1; MG/1
TABLET ORAL
Qty: 45 TABLET | Refills: 0 | Status: SHIPPED | OUTPATIENT
Start: 2023-10-12 | End: 2024-01-18 | Stop reason: SDUPTHER

## 2023-10-12 NOTE — PATIENT INSTRUCTIONS
Thank you for allowing me to participate as part of your health care team, and thank you for choosing [facility].    KT WADE MD  Board Certified in Psychiatry & Addiction Medicine      IN CASE OF SUICIDAL THINKING, call the National Suicide Hotline Number: 988    988 Suicide & Crisis Lifeline: 988 , 7-933-175-TALK (8255)  https://Ingogo.org           AFTER VISIT INSTRUCTIONS:     [x] Take all medication, from all providers, as prescribed.  [x] If questions or concerns arise, or if experiencing side effects, adverse reactions or worsening symptoms, contact me or the appropriate provider through the [facility] portal at [email or web address], or call [phone] to speak with my office staff or [phone] to reach the [facility] main line.  [x] In cases of emergencies, call [phone] or [phone], or present directly to the emergency department for immediate assistance.       ADJUSTMENTS TO YOUR REGIMEN:  - N/A  - no adjustments to your medication regimen were made at this encounter  - continue your medication regimen as prescribed      INFORMATION ON MENTAL HEALTH MEDICATIONS:     National [place] of Mental Health:   https://www.nimh.nih.gov/health/topics/mental-health-medications     Web MD:   https://www.webmd.com       RESOURCES:     IN CASE OF SUICIDAL THINKING, call the National Suicide Hotline Number: 988    988 Suicide & Crisis Lifeline: 988 , 8-995-819-TALK (8255)  Provides 24/7, free and confidential support for people in distress, prevention and crisis resources for you or your loved ones, and best practices for professionals.    Call, text or chat.  [email or web address]://[email or web address]     National Action [place] for Suicide Prevention: the National [place] for Suicide Prevention (Action [place]) is the nations public-private partnership for suicide prevention, working with more than 250 national partners.   https://theactionalliance.org     National Strategy for Suicide Prevention & Risk  Mitigation:  https://theactionalliance.org/our-strategy/national-strategy-suicide-prevention     [x] Fact Sheet:   https://www.[facility].gov/sites/default/files/national-strategy-for-suicide-prevention-factsheet.pdf     [x] Report:   https://www.ncbi.nlm.nih.gov/books/FXC343062/pdf/Bookshelf_NBK109917.pdf     Suicide Prevention Resource Center: The Suicide Prevention Resource Center (SPR) is the only federally supported resource center devoted to advancing the implementation of the National Strategy for Suicide Prevention. [facility] is funded by the U.S. Department of Health and Human Services' Substance Abuse and Mental Health Services Administration (SAMA).  https://www.[facility].org     [x] Safety Plan:   https://[employer]/wp-content/uploads/2021/08/Unique-Safety-Plan-8-6-21.pdf     [x] Suicide Risk Curve:  https://[employer]/wp-content/uploads/2021/08/Oacyyzc-qcoj-xyvvs-8-6-21.pdf     [place] Mental Health Advocacy Service: the state agency tasked with protecting the legal rights of people with behavioral health diagnoses.  https://mhas.[place].[facility]     Alcoholics Anonymous (AA): find a meeting near you.  https://www.aa.org     SMI Adviser: resources for individuals and families with serious mental illness.  https://smiadviser.org     National [place] for the Mentally Ill (ARAM): the nation's largest grassroots organization dedicated to building better lives for individuals with mental illness.  https://www.aram.org/Home     U.S. Department of Health and Human Services (HHS): the mission of HHS is to enhance the health and well-being of all Americans, by providing for effective health and human services and by fostering sound, sustained advances in the sciences underlying medicine, public health, and .   https://www.hhs.gov     Substance Abuse and Mental Health Services Administration (SAMHSA): [facility] is the agency within [facility] that leads public health efforts to advance the  behavioral health of the nation. [facility]'s mission is to reduce the impact of substance abuse and mental illness on [place]'s communities.   https://www.samhsa.gov     National Institutes of Health ([facility]): a part of [facility], [facility] is the largest biomedical research agency in the world.   https://www.nih.gov     National [place] on Drug Abuse (BIANCA): sponsored by the NIH, the mission of BIANCA is to advance science on drug use and addiction and to apply that knowledge to improve individual and public health.  https://bianca.nih.gov     National [place] on Alcohol Abuse and Alcoholism (NIAAA): sponsored by the NIH, the mission of NIAA is to generate and disseminate fundamental knowledge about the effects of alcohol on health and well-being, and apply that knowledge to improve diagnosis, prevention, and treatment of alcohol-related problems, including alcohol use disorder, across the lifespan.   https://www.niaaa.nih.gov     National Harm Reduction Coalition: resources for harm reduction, including techniques, strategies, policy, and advocacy.  https://harmreduction.org     The SHARE Approach - A Model for Shared Decision Making:  [x] Fact Sheet  https://www.ahrq.gov/sites/default/files/publications/files/share-approach_factsheet.pdf     AMA Principles of Medical Ethics - Informed Consent & Shared Decision Making:  [x] Chapter  https://www.ama-assn.org/system/files/2019-06/code-of-medical-nkzmex-dfowlaj-8.pdf     Safety Netting for Primary Care:  [x] Article  https://www.ncbi.nlm.nih.gov/pmc/articles/CIW9771516/pdf/miiquta-8772--e70.pdf       MEDICATION MANAGEMENT:     [x] In addition to the potential beneficial effects, the use of any medication or drug (prescribed, over the counter or otherwise) carries with it the risk of potential adverse effects.  Each has a set of typical adverse effects - some common, some rare - but idiosyncratic and unanticipated reactions unique to you are always possible.      [x] It is  important to remember that untreated illness can also pose a risk, which must be taken into account when weighing the pros and cons of a medication trial.    [x] Medications and drugs can sometimes interact with each other in the body, leading to adverse effects - it is important that all your providers know all the medications and drugs you take - prescribed, over the counter, or otherwise.  Keep me and all your practitioners up to date with any changes.  It's always a good idea to keep an up-to-date list in an easily accessible location.    [x] There is an inherent unpredictability to all treatment, including the use of medication.  Unexpected outcomes can occur - keep me up to date with any difficulties you encounter.    [x] It is important to take medication as directed, and to comply fully with the instructions.  Check with me or the appropriate provider first before adjusting or stopping your medication on your own.    If you have any further questions or concerns about your care, please contact me or your other providers through the [facility] portal at [email or web address], or call [phone] or [phone].  We would be happy to provide you additional information pertaining to your diagnosis, intended outcomes, target symptoms for treatment, and possible benefits and risks of medication - you can also access this information through the provided resources.  Possible alternatives to the current treatment plan (including no treatment) can also be reviewed.      GENERAL HEALTH & WELLNESS:     [x] Establish and follow regularly with a primary care physician for routine health maintenance and management of any medical comorbidities.  [x] Follow a healthy diet, exercise routinely, and monitor weight and metabolic parameters.  [x] Allow adequate time for sleep and practice good sleep hygiene.  [x] Do not operate a motor vehicle or heavy machinery if the effects of medications or the symptoms underlying your  condition impair the ability for you to do so safely.    Dietary Guidelines for Americans, 6979-1469:  U.S. Department of Agriculture (USDA)  https://www.dietaryguidelines.gov/sites/default/files/2020-12/Dietary_Guidelines_for_Americans_2020-2025.pdf#page=31     The Nutrition Source:  [facility]  https://www.[facility]/nutritionsource       SLEEP HYGIENE:     Follow these tips to establish healthy sleep habits:  [x] Keep a consistent sleep schedule. Get up at the same time every day, even on weekends or during vacations.  [x] Set a bedtime that is early enough for you to get at least 7-8 hours of sleep.  [x] Don't go to bed unless you are sleepy.  [x] If you don't fall asleep after 20 minutes, get out of bed. Go do a quiet activity without a lot of light exposure. It is especially important to not get on electronics.  [x] Establish a relaxing bedtime routine.  [x] Use your bed only for sleep and sex.  [x] Make your bedroom quiet and relaxing. Keep the room at a comfortable, cool temperature.  [x] Limit exposure to bright light in the evenings.  [x] Turn off electronic devices at least 30 minutes before bedtime.  [x] Don't eat a large meal before bedtime. If you are hungry at night, eat a light, healthy snack.  [x] Exercise regularly and maintain a healthy diet.  [x] Avoid consuming caffeine in the afternoon or evening.  [x] Avoid consuming alcohol before bedtime.  [x] Reduce your fluid intake before bedtime.    QUICK TIPS FOR BETTER SLEEP  Reduce smartphone usage Create and maintain a nightly ritual Avoid caffeine 4-6 hours before sleeping Don't eat or drink too much at bedtime Sleep at the same time every night        American Academy of Sleep Medicine - Healthy Sleep Habits:  https://sleepeducation.org/healthy-sleep/healthy-sleep-habits     American Academy of Sleep Medicine - Bedtime Calculator:  https://sleepeducation.org/healthy-sleep/bedtime-calculator     American Academy of Sleep  Medicine - Cognitive Behavioral Therapy for Insomnia (CBT-I):  https://sleepeducation.org/patients/cognitive-behavioral-therapy     American Academy of Sleep Medicine - Insomnia:  https://sleepeducation.org/sleep-disorders/insomnia       ALCOHOL & DRUG USE COUNSELING:     Preventing Excessive Alcohol Use (CDC):  https://www.cdc.gov/alcohol/fact-sheets/moderate-drinking.htm#:~:text=To%20reduce%20the%20risk%20of,days%20when%20alcohol%20is%20consumed.     [x] Alcohol consumption is associated with a variety of short- and long-term health risks, including motor vehicle crashes, violence, sexual risk behaviors, high blood pressure, and various cancers (e.g., breast cancer).  [x] The risk of these harms increases with the amount of alcohol you drink. For some conditions, like some cancers, the risk increases even at very low levels of alcohol consumption (less than 1 drink).  [x] To reduce the risk of alcohol-related harms, the 3609-5469 Dietary Guidelines for Americans recommends that adults of legal drinking age can choose not to drink, or to drink in moderation by limiting intake to 2 drinks or less in a day for men or 1 drink or less in a day for women, on days when alcohol is consumed.  [x] The Guidelines also do not recommend that individuals who do not drink alcohol start drinking for any reason and that if adults of legal drinking age choose to drink alcoholic beverages, drinking less is better for health than drinking more.  [x] The Guidelines note that some people should not drink alcohol at all, such as:  - If they are pregnant or might be pregnant.  - If they are younger than age 21.  - If they have certain medical conditions or are taking certain medications that can interact with alcohol.  - If they are recovering from an alcohol use disorder or if they are unable to control the amount they drink.  [x] The Guidelines also note that not drinking alcohol is the safest option for women who are lactating.  "Generally, moderate consumption of alcoholic beverages by a woman who is lactating (up to 1 standard drink in a day) is not known to be harmful to the infant, especially if the woman waits at least 2 hours after a single drink before nursing or expressing breast milk. Women considering consuming alcohol during lactation should talk to their healthcare provider.  [x] The Guidelines note, Emerging evidence suggests that even drinking within the recommended limits may increase the overall risk of death from various causes, such as from several types of cancer and some forms of cardiovascular disease. Alcohol has been found to increase risk for cancer, and for some types of cancer, the risk increases even at low levels of alcohol consumption (less than 1 drink in a day).  [x] Although past studies have indicated that moderate alcohol consumption has protective health benefits (e.g., reducing risk of heart disease), recent studies show this may not be true.  [x] Its important to focus on the amount people drink on the days that they drink. Even if women consume an average of 1 drink per day or men consume an average of 2 drinks per day, binge drinking increases the risk of experiencing alcohol-related harm in the short-term and in the future.    Drinking Levels Defined (NIAAA):  https://www.niaaa.nih.gov/alcohol-health/overview-alcohol-consumption/moderate-binge-drinking     Drinking in Moderation:  According to the "Dietary Guidelines for Americans 0361-2282, U.S. Department of Health and Human Services and U.S. Department of Agriculture, adults of legal drinking age can choose not to drink or to drink in moderation by limiting intake to 2 drinks or less in a day for men and 1 drink or less in a day for women, when alcohol is consumed. Drinking less is better for health than drinking more.    Binge Drinking:  NIAAA defines binge drinking as a pattern of drinking alcohol that brings blood alcohol concentration (VINCENT) " to 0.08 percent - or 0.08 grams of alcohol per deciliter - or higher.  For a typical adult, this pattern corresponds to consuming 5 or more drinks (male), or 4 or more drinks (female), in about 2 hours.    The Substance Abuse and Mental Health Services Administration (SAMHSA), which conducts the annual National Survey on Drug Use and Health (NSDUH), defines binge drinking as 5 or more alcoholic drinks for males or 4 or more alcoholic drinks for females on the same occasion (i.e., at the same time or within a couple of hours of each other) on at least 1 day in the past month.    Heavy Alcohol Use:  NIAAA defines heavy drinking as follows:  - For men, consuming more than 4 drinks on any day or more than 14 drinks per week.  - For women, consuming more than 3 drinks on any day or more than 7 drinks per week.     [facility] defines heavy alcohol use as binge drinking on 5 or more days in the past month.    Patterns of Drinking Associated with Alcohol Use Disorder:  Binge drinking and heavy alcohol use can increase an individual's risk of alcohol use disorder.    Certain people should avoid alcohol completely, including those who:  - Plan to drive or operate machinery, or participate in activities that require skill, coordination, and alertness.  - Take certain over-the-counter or prescription medications.  - Have certain medical conditions.  - Are recovering from alcohol use disorder or are unable to control the amount that they drink.  - Are younger than age 21.  - Are pregnant or may become pregnant.    U.S. Standard Drink  12 oz beer   (5% ABV) 8 oz malt liquor   (7% ABV) 5 oz wine   (12% ABV) 1.5 oz 80-proof distilled spirit  (40% ABV)        Heroin use harm reduction:  1. Carry naloxone. When using heroin, make sure you have at least one dose of naloxone - the overdose reversal drug - and have it in plain view. Understand how to give it.  2. Try a small dose first. It is best to first try a small amount of the heroin  to check the effect.  3. Dont use heroin alone. Always use heroin with someone else and take turns while using.    It is possible to overdose with heroin whether you are snorting, injecting or using it in another form.    Signs of an overdose or emergency:   - The person is awake but unable to talk.  - Their body is limp.  - Their breathing is shallow or slow or stopped.  - Their skin is pale, ashen or clammy/sweaty.  - They are unconscious.    In case of emergency, give naloxone. If you suspect the heroin may contain fentanyl, administer more than one dose. Seek medical help even if naloxone has been given. Call 911 for help.      ADHD TREATMENT AND STIMULANT MEDICATIONS:     NIH Prescription Stimulants Drug Facts  CMS Stimulant and Related Medications: Use in Adults  VANESSA Drug Fact Sheets: Stimulants  FDA Drug Safety Communication: Stimulants  [facility] ADHD  WebMD ADHD Medications and Side Effects  [facility]: ADHD Medication      SHARED DECISION MAKING & INFORMED CONSENT:     Shared medical decision making and informed consent are the hallmark and bedrock of excellent clinical care.  During the encounter, shared medical decision making was employed and informed consent was obtained, to the degree possible, whenever feasible, appropriate and relevant. Those interventions are supplemented here with written materials, detailing the topics in more depth.       PSYCHOEDUCATION:     Psychoeducation pertaining to the following -     Diagnosis Etiology Disease Processes Natural Progression   Treatment Options Time Course Safety Netting Informed Consent   Intended Benefits of Medication Expectable Adverse Effects Target Symptoms for Treatment Alternatives to Current Treatment   Shared   Decision Making Risk Mitigation Strategies Harm Reduction Techniques Associated Bio-Med Complications     - can be further discussed and reviewed (you can also access additional information through the provided resources in this  document).    Effective communication is essential in order to engage in shared medical decision making.  If you had difficulty understanding anything during your encounter or in this supplementary document, please contact your providers through the [facility] portal at [email or web address] or call [phone].     Jareth Dictionary  https://dictionary.PostBeyond.org/us     It can be easy to miss, forget, or misremember important important information that was discussed during the session - especially when you're stressed, upset, or don't feel well.  If you or a representative have any additional questions, concerns, or topics to discuss - please contact me or your other providers through the [facility] portal at [email or web address]."[employer]", or call [phone] or [phone].      Memory Loss  https://www.DocLanding.Movie Mouth/brain/memory-loss    Causes of Memory Loss  https://www.TradeGig/what-causes-memory-loss-1190152    Memory loss: When to seek help  https://www.mayoinic.org/diseases-conditions/alzheimers-disease/in-depth/memory-loss/art-47257438    Memory, Forgetfulness, and Aging: What's Normal and What's Not?  https://www.rigoberto.nih.gov/health/memory-forgetfulness-and-aging-whats-normal-and-whats-not    Depression and Memory Loss  https://www.CarbonFlow.Movie Mouth/health/depression/depression-and-memory-loss    The Relationship Between Anxiety and Memory Loss  https://www.InflaRx.[facility]/academics/blog-posts/the-relationship-between-anxiety-and-memory-loss     PRESCRIPTION DRUG MANAGEMENT:     Prescription Drug Management entails the following:  [x] The review, recommendation, or consideration without recommendation of medications during the encounter.  [x] Discussion (to the extent possible) with the patient and/or other interested parties of the diagnosis, target symptoms, intended outcomes, and possible benefits and risks of medication, as well as alternatives (including no treatment), if not otherwise known or stated  prior.  [x] Discussion (to the extent possible) with the patient and/or other interested parties of possible expectable adverse effects of any proposed individual psychotropic agents, as well as the inherent unpredictability of treatment, if not otherwise known or stated prior.  [x] Informed consent is sought from the patient (and/or guardian/designated decision maker, if applicable) after a thorough discussion (to the extent possible) of the aforementioned points outlined above.  [x] The provision of counseling (to the extent possible) to the patient and/or other interested parties on the importance of full compliance with any prescribed medication, if not otherwise known or stated prior.    Information on psychotropic medication can be found at:   National [place] of Mental Health: Information on Mental Health Medications      RISK MITIGATION, HARM REDUCTION & SAFETY NETTING:     Risk Mitigation Strategies, Harm Reduction Techniques, and Safety Netting are important interventions that can reduce acute and chronic risk.  As such, opportunities were sought to incorporate psychoeducation and practical advice pertaining to these topics into the encounter, to the degree possible, whenever feasible, appropriate and relevant.  Those interventions are supplemented here with written materials, detailing the topics in more depth.       RISK MITIGATION STRATEGIES:     Risk mitigation strategies are used to reduce the likelihood of future episodes of suicide, homicide, violence, and/or other problematic behaviors (e.g. self-injurious, risky, addictive, compulsive, impulsive). The following are examples of risk mitigation strategies which you can employ in order to reduce your overall burden of risk.     [x] Treatment of underlying psychopathology driving acute and chronic risk to the extent possible.  [x] Use of self administered rating scales and journaling to assist in risk tracking.  [x] Exploration of protective factors  to potentially counterbalance risk.  [x] Identification and avoidance of triggers and situations that increase risk, including excessive alcohol and drug use.  [x] Timely follow up and ongoing treatment of mental health issues moving forward.  [x] Full compliance with medication regimen.  [x] A good working knowledge of your medication regimen, including specific instructions on the administration of the medications.  [x] Consultation with an appropriate medical provider prior to altering or deviating from these instructions on your own.  [x] Active involvement and participation of family and natural support wherever feasible and possible.  [x] Development and review of coping strategies that can be immediately deployed in times of acute crisis.  [x] Implementation of home safety practices and the removal/reduction of access to lethal means (including, but not limited to, firearms, certain types and quantities of medication, poisons, or other methods you may have contemplated or identified).  [x] Collaborative development of a written safety plan with your treatment team and loved ones that can be immediately referred to in times of acute crisis.  [x] Utilization of a safety contract to engage your treatment team and further assess/manage risk.  [x] A good working knowledge of how to access emergency treatment in times of acute crisis.  [x] Utilization of suicide hotlines number (988) and resources in times of crisis.    If you require further information pertaining to the issues outlined above, please reach out to me or your other providers through the [facility] portal at [email or web address], or call [phone] or [phone] to discuss.  See resource list for additional material.      SAFETY NETTING:     In healthcare, safety netting refers to the provision of information to help patients or carers identify the need to consult a health care professional if a health concern arises or changes.  The relevance  of this advice is most obvious with chronic mental illnesses, as their dynamic nature, with symptoms and signs emerging at different times and in different combinations, makes safety netting particularly important.  Specific safety net advice for you includes the following:    [x] The existence of uncertainty. Mental health diagnoses and conditions contain at least some degree of uncertainty - knowing this, you should feel empowered to reconsult if necessary.  [x] What exactly to look out for. Given the recognised risk of possible deterioration or the development of complications, you should become familiar with the specific clinical features (including red flags) to look out for.    [x] How exactly to seek further help. You should know how and where to seek further help if needed.  Make a plan in advance and keep it handy.  It's also a good idea to share the plan with your treatment providers and loved ones.  [x] What to expect about time course. Mental health diagnoses and conditions often have an expected time course, which is important information for you to know.  However, if your difficulties do not conform to this time line and concerns arise, do not delay seeking further medical advice.    If you require further information pertaining to the issues outlined above, please reach out to me or your other providers through the [facility] portal at [email or web address], or call [phone] or [phone] to discuss.  See resource list for additional material.      HARM REDUCTION:     Harm Reduction techniques are used in an effort to reduce negative consequences associated with risky and maladaptive behaviors, until cessation of the problematic behaviors can be established.  Harm reduction is best thought of as a journey and not a destination; it is not an endorsement of problematic behavior, but an acknowledgement and recognition of the step-by-step nature of recovery.      Although commonly employed in  working with people who suffer with drug addiction, harm reduction can be more broadly applied to any problematic behavior.    Harm Reduction and Substance Abuse:  [x] Incorporates a spectrum of strategies that includes safer use, managed use, abstinence, meeting people who use drugs where theyre at, and addressing conditions of use along with the use itself.  [x] Accepts, for better or worse, that licit and illicit drug use is part of our world and chooses to work to minimize its harmful effects rather than simply ignore or condemn them.  [x] Understands drug use as a complex, multi-faceted phenomenon that encompasses a continuum of behaviors from severe use to total abstinence, and acknowledges that some ways of using drugs are clearly safer than others.  [x] Calls for the non-judgmental, non-coercive provision of services and resources to people who use drugs and the communities in which they live in order to assist them in reducing attendant harm.  [x] Affirms people who use drugs themselves as the primary agents of reducing the harms of their drug use and seeks to empower them to share information and support each other in strategies which meet their actual conditions of use.  [x] Does not attempt to minimize or ignore the real and tragic harm and danger that can be associated with illicit drug use.  [x] Meets people where they are, but seeks to not leave them there.  [x] Examples of specific interventions include, but are not limited to, narcan (naloxone), medication assisted treatment, syringe access, overdose prevention, and safer drug use techniques.    Key Harm Reduction Strategies: Opioid Use Disorder  [x] Safe Injection Sites & Equipment  [x] Managed Use  [x] Syringe Exchange Programs  [x] Fentanyl Test Strips  [x] Pharmacotherapy/Medication Assisted Treatment  [x] Narcan  [x] Good Confucianist Laws  [x] Treatment Instead of nursing home  [x] Diversion Programs  [x] Overdose Education  [x]  "Abstinence    Whether or not you struggle with substance abuse, any and all opportunities to employ harm reduction techniques to address difficult to change problematic behaviors should be sought and implemented - whenever and wherever feasible, relevant and applicable. Additionally, harm reduction techniques can be applied broadly, and are relevant for a multitude of situations - even those that do not involve problematic or maladaptive behaviors.     EXAMPLES OF HARM REDUCTION IN OTHER AREAS  SUN SCREEN SEAT BELTS SPEED LIMITS BIRTH CONTROL        If you require further information pertaining to the issues outlined above, please reach out to me through the [facility] portal or call [phone] to discuss.  See resource list for additional material.      FIREARM SAFETY:     THE SIX BASIC GUN SAFETY RULES  There are six basic gun safety rules for gun owners to understand and practice at all times:  Treat all guns as if they are loaded. Always assume that a gun is loaded even if you think it is unloaded. Every time a gun is handled for any reason, check to see that it is unloaded. If you are unable to check a gun to see if it is unloaded, leave it alone and seek help from someone more knowledgeable about guns.  Keep the gun pointed in the safest possible direction. Always be aware of where a gun is pointing. A "safe direction" is one where an accidental discharge of the gun will not cause injury or damage. Only point a gun at an object you intend to shoot. Never point a gun toward yourself or another person.  Keep your finger off the trigger until you are ready to shoot. Always keep your finger off the trigger and outside the trigger guard until you are ready to shoot. Even though it may be comfortable to rest your finger on the trigger, it also is unsafe. If you are moving around with your finger on the trigger and stumble or fall, you could inadvertently pull the trigger. Sudden loud noises or movements can result " in an accidental discharge because there is a natural tendency to tighten the muscles when startled. The trigger is for firing and the handle is for handling.  Know your target, its surroundings and beyond. Check that the areas in front of and behind your target are safe before shooting. Be aware that if the bullet misses or completely passes through the target, it could strike a person or object. Identify the target and make sure it is what you intend to shoot. If you are in doubt, DON'T SHOOT! Never fire at a target that is only a movement, color, sound or unidentifiable shape. Be aware of all the people around you before you shoot.  Know how to properly operate your gun. It is important to become thoroughly familiar with your gun. You should know its mechanical characteristics including how to properly load, unload and clear a malfunction from your gun. Obviously, not all guns are mechanically the same. Never assume that what applies to one make or model is exactly applicable to another. You should direct questions regarding the operation of your gun to your firearms dealer, or contact the  directly.  Store your gun safely and securely to prevent unauthorized use. Guns and ammunition should be stored separately. When the gun is not in your hands, you must still think of safety. Use an approved firearms safety device on the gun, such as a trigger lock or cable lock, so it cannot be fired. Store it unloaded in a locked container, such as an approved lock box or a gun safe. Store your gun in a different location than the ammunition. For maximum safety you should use both a locking device and a storage container.    ADDITIONAL SAFETY POINTS  The six basic safety rules are the foundational rules for gun safety. However, there are additional safety points that must not be overlooked.  [x] Never handle a gun when you are in an emotional state such as anger or depression. Your judgment may be impaired. If you  "have acute or chronic suicidal ideation, a suicide plan, or suicidal intent, have firearms removed and your access restricted by a trusted loved one or other responsible individual or agency.  [x] Never shoot a gun in celebration (the Fourth of July or New Year's Cha, for example). Not only is this unsafe, but it is generally illegal. A bullet fired into the air will return to the ground with enough speed to cause injury or death.  [x] Do not shoot at water, flat or hard surfaces. The bullet can ricochet and hit someone or something other than the target.  [x] Hand your gun to someone only after you verify that it is unloaded and the cylinder or action is open. Take a gun from someone only after you verify that it is unloaded and the cylinder or action is open.  [x] Guns, alcohol and drugs don't mix. Alcohol and drugs can negatively affect judgment as well as physical coordination. Alcohol and any other substance likely to impair normal mental or physical functions should not be used before or while handling guns. Avoid handling and using your gun when you are taking medications that cause drowsiness or include a warning to not operate machinery while taking this drug.   [x] The loud noise from a fired gun can cause hearing damage, and the debris and hot gas that is often emitted can result in eye injury. Always wear ear and eye protection when shooting a gun.      GUNS AND CHILDREN - FIREARM OWNER RESPONSIBILITIES    You Cannot Be Too Careful with Children and Guns  [x] There is no such thing as being too careful with children and guns. Never assume that simply because a toddler may lack finger strength, they can't pull the trigger. A child's thumb has twice the strength of the other fingers. When a toddler's thumb "pushes" against a trigger, invariably the barrel of the gun is pointing directly at the child's face. NEVER leave a firearm lying around the house.  [x] Child safety precautions still apply even if you " "have no children or if your children have grown to adulthood and left home. A nephew, niece, neighbor's child or a grandchild may come to visit. Practice gun safety at all times.  [x] To prevent injury or death caused by improper storage of guns in a home where children are likely to be present, you should store all guns unloaded, lock them with a firearms safety device and store them in a locked container. Ammunition should be stored in a location separate from the gun.    Talking to Children About Guns  [x] Children are naturally curious about things they don't know about or think are "forbidden." When a child asks questions or begins to act out "gun play," you may want to address his or her curiosity by answering the questions as honestly and openly as possible. This will remove the mystery and reduce the natural curiosity. Also, it is important to remember to talk to children in a manner they can relate to and understand. This is very important, especially when teaching children about the difference between "real" and "make-believe." Let children know that, even though they may look the same, real guns are very different than toy guns. A real gun will hurt or kill someone who is shot.    Instill a Mind Set of Safety and Responsibility  [x] The American Academy of Pediatrics reports that adolescence is a highly vulnerable stage in life for teenagers struggling to develop traits of identity, independence and autonomy. Children, of course, are both naturally curious and innocently unaware of many dangers around them. Thus, adolescents as well as children may not be sufficiently safeguarded by cautionary words, however frequent. Contrary actions can completely undermine good advice. A "Do as I say and not as I do" approach to gun safety is both irresponsible and dangerous.  [x] Remember that actions speak louder than words. Children learn most by observing the adults around them. By practicing safe conduct you will " also be teaching safe conduct.    Safety and Storage Devices  [x] If you decide to keep a firearm in your home you must consider the issue of how to store the firearm in a safe and secure manner. There are a variety of safety and storage devices currently available to the public in a wide range of prices. Some devices are locking mechanisms designed to keep the firearm from being loaded or fired, but don't prevent the firearm from being handled or stolen. There are also locking storage containers that hold the firearm out of sight. For maximum safety you should use both a firearm safety device and a locking storage container to store your unloaded firearm.   Two of the most common locking mechanisms are trigger locks and cable locks. Trigger locks are typically two-piece devices that fit around the trigger and trigger guard to prevent access to the trigger. One side has a post that fits into a hole in the other side. They are locked by a key or combination locking mechanism. Cable locks typically work by looping a strong steel cable through the action of the firearm to block the firearm's operation and prevent accidental firing. However, neither trigger locks nor cable locks are designed to prevent access to the firearm.   [x] Smaller lock boxes and larger gun safes are two of the most common types of locking storage containers. One advantage of lock boxes and gun safes is that they are designed to completely prevent unintended handling and removal of a firearm. Lock boxes are generally constructed of sturdy, high-grade metal opened by either a key or combination lock. Gun safes are quite heavy, usually weighing at least 50 pounds. While gun safes are typically the most expensive firearm storage devices, they are generally more reliable and secure.     Remember: Safety and storage devices are only as secure as the precautions you take to protect the key or combination to the lock.    RULES FOR KIDS  Adults should be  aware that a child could discover a gun when a parent or another adult is not present. This could happen in the child's own home; the home of a neighbor, friend or relative; or in a public place such as a school or park. If this should happen, a child should know the following rules and be taught to practice them.   Stop  The first rule for a child to follow if he/she finds or sees a gun is to stop what he/she is doing.  Don't Touch!  The second rule is for a child not to touch a gun he/she finds or sees. A child may think the best thing to do if he/she finds a gun is to pick it up and take it to an adult. A child needs to know he/she should NEVER touch a gun he/she may find or see.  Leave the Area  The third rule is to immediately leave the area. This would include never taking a gun away from another child or trying to stop someone from using gun.  Tell an Adult  The last rule is for a child to tell an adult about the gun he/she has seen. This includes times when other kids are playing with or shooting a gun.     METHODS OF CHILDPROOFING YOUR FIREARM  As a responsible handgun owner, you must recognize the need and be aware of the methods of childproofing your handgun, whether or not you have children.  Whenever children could be around, whether your own, or a friend's, relative's or neighbor's, additional safety steps should be taken when storing firearms and ammunition in your home.  [x] Always store your firearm unloaded.  [x] Use a firearms safety device AND store the firearm in a locked container.  [x] Store the ammunition separately in a locked container.  Always storing your firearm securely is the best method of childproofing your firearm; however, your choice of a storage place can add another element of safety. Carefully choose the storage place in your home especially if children may be around.  [x] Do not store your firearm where it is visible.  [x] Do not store your firearm in a bedside table, under  your mattress or pillow, or on a closet shelf.  [x] Do not store your firearm among your valuables (such as jewelry or cameras) unless it is locked in a secure container.  [x] Consider storing firearms not possessed for self-defense in a safe and secure manner away from the home.    [employer] for Gun Safety:  https://www.everytown.org       Gun Violence: Prediction, Prevention and Policy  American Psychological Association Panel of Experts Report  https://www.apa.org/pubs/reports/gun-violence-report.pdf       If you require further information pertaining to any of the issues outlined above, please reach out to me or your other providers through the [facility] portal at [email or web address], or call [phone] or [phone] to discuss.  See resource list for additional material.      IN CASE OF SUICIDAL THINKING, call the National Suicide Hotline Number: 988    988 Suicide & Crisis Lifeline: 980 , 0-025-793-SPGE (7546)  Provides 24/7, free and confidential support for people in distress, prevention and crisis resources for you or your loved ones, and best practices for professionals.    Call, text or chat.  https://[employer].org

## 2023-10-12 NOTE — PROGRESS NOTES
274}        ESTABLISHED VISIT: PSYCHIATRY     ASSESSMENT AND PLAN:     DIAGNOSES & PROBLEMS:  1. Attention deficit hyperactivity disorder (ADHD), predominantly inattentive type    2. History of palpitations in adulthood    3. Raynaud's disease without gangrene        In Summary:  - Long history of ADHD, well controlled on current regimen.     Plan:  - Was suppose to come to in office visit today but is sick - due to extenuating circumstances was converted to video visit - asked him to come in person next visit.    Adderall 30mg daily + 15mg afternoon     COURSE: chronic  CURRENT CONDITION: status quo unchanged, overall stable, at or near baseline  - continue psychotropic regimen as prescribed  - tolerating medication(s) well with no significant adverse effects noted  - follow with primary care provider for routine health maintenance and management of medical co-morbidities  - defer non-psychiatric medication(s) and management to the current primary and/or specialist provider(s) of record  - monitor for symptoms of cardiac dysfunction       INTERVAL HISTORY AND REVIEW OF SYSTEMS:     Juan Manuel Hdz is a 42 y.o. patient seen for a follow up psychiatric evaluation.  An interval history of the presenting illness (HPI) was obtained, and a pertinent psychiatric and medical review of systems was performed.    Global Subjective Rating: good, about the same as last session    [] Y  [x] N  sleep disturbance: *over 8 hours*    [] Y  [x] N  appetite/weight change: **    [x] Y  [] N  fatigue/anergia: *intermittent periods*    [x] Y  [] N  impairment in focus/concentration: *but reports it has been better than it ever has been*       [] Y  [x] N  depression: **     [] Y  [x] N  anxiety/worry: **     [] Y  [x] N  somatically preoccupied: **   [] Y  [x] N  dysregulated mood/behavior: **   Negative for: moodiness, irritability  [] Y  [x] N  manic symptomatology: **     [] Y  [x] N   "psychosis: **   Negative for: paranoia, hallucinations    [] Y  [x] N  pain: **    [] Y  [x] N  cardiac symptoms: **  Negative for: tachycardia or palpitations  [] Y  [x] N  abnormal movements: **   Negative for: tics or tremor         - recently feeling under the weather  - he has been really busy  - recently moved - a little more expensive  - hasn't seen primary in some time - encouraged to make follow up  - son Theron about to be 6yo - doing great  - wife doing well - [place] get [place] the [place]  - no issues with medications    PERTINENT PAST HISTORY:     Initiated treatment with me in March 2009  Diagnosed w/ ADHD in 7th or 8th grade, but problems w/ school as long as he can remember. Took Ritalin through HS - stopped in 2000.     PAST PSYCHOTROPIC TRIALS:  Ritalin - helpful with concentration but some mild upset stomach and "zombie like feeling"      EXAMINATION:     Vitals:  There were no vitals taken for this visit.    MENTAL STATUS EXAMINATION:  General Appearance: ** adequately groomed, appropriately dressed, in no apparent distress  Behavior: ** cooperative, under good behavioral control  Involuntary Movements and Motor Activity: ** no tics or tremors, no abnormal involuntary movements noted, no psychomotor agitation or retardation  Speech and Language: ** conversational, spontaneous, speaks and understands English proficiently  Mood: fine  Affect: ** reactive, mood-congruent, appropriate to situation and context  Thought Process and Associations: ** linear and goal-directed, with no loosening of associations  Thought Content and Perceptions: ** no hallucinations or paranoia, no evidence of psychosis  Sensorium: ** alert and oriented, with clear sensorium  Recent and Remote Memory: ** grossly intact, no significant impairments noted  Attention and Concentration: ** grossly intact, mild distractibility noted  Fund of Knowledge: ** grossly intact, used appropriate " vocabulary, no significant deficits noted  Insight: ** intact, demonstrates awareness of condition/situation  Judgment: ** intact, behavior is adequate/appropriate given the circumstances  Reliability: The patient is deemed to be a reliable and factually accurate historian. **      RISK MANAGEMENT:     Risk Parameters:  I[]I Y  I[x]I N  I[]I U  I[]I A  Suicidal Ideation/Behavior: **   I[]I Y  I[x]I N  I[]I U  I[]I A  Homicidal Ideation/Behavior: **  I[]I Y  I[x]I N  I[]I U  I[]I A  Violence: **  I[]I Y  I[x]I N  I[]I U  I[]I A  Self-Injurious Behavior: **    I[]I Y  I[x]I N  I[]I U  I[]I A  I[]I N/A  Minimization of Risk Parameters Suspected/Evident: **  I[]I Y  I[x]I N  I[]I U  I[]I A  I[]I N/A  Exaggeration of Risk Parameters Suspected/Evident: **     The patient was able to satisfactorily contract for safety and was noted to be future oriented.  Protective factors were identified.    Current risk is judged to be:   I[x]I Low    I[]I Moderate   I[]I High    [] Y  [x] N  I[]I U  I[]I N/A  Danger to Self:   [] Y  [x] N  I[]I U  I[]I N/A  Danger to Others:   [] Y  [x] N  I[]I U  I[]I N/A  Grave Disability:         David Kelly MD  Department of Psychiatry, [facility] Board Certified, Psychiatry and Addiction Medicine          TELE-HEALTH / VIRTUAL VISIT:     The patient location is: ** in the community, in the [facility].    Visit type: audiovisual    Face to Face time with patient: 12 minutes    Each patient who is provided medical services by telemedicine is: (1) informed of the relationship between the physician and patient and the respective role of any other health care provider with respect to management of the patient; and (2) notified that he or she may decline to receive medical services by telemedicine and may withdraw from such care at any time.     MANAGEMENT:     I[]I Y = Yes / Present / Endorses.  I[]I N = No / Absent / Denies.  I[]I U = Unknown / Unable to Assess /  Unwilling to Participate.  I[]I A = Ambiguity Exists / Accuracy Uncertain.  I[]I D = Denial or Minimization is Suspected/Evident.  I[]I N/A = Non-Applicable.    Complexity (level) of medical decision making employed in the encounter: MODERATE  The total time spent on the date of the encounter: 20 minutes    Chart Review: Available documentation has been reviewed, and pertinent elements of the chart have been incorporated into this evaluation where appropriate.  Last [facility] encounter with me: 3/16/2023.    [x] In cases of emergencies (e.g. SI/HI resulting in danger to self or others, functioning deteriorates to the level of grave disability), call 911 or [phone], or present to the emergency department for immediate assistance.  [x] Patient should not operate a motor vehicle or heavy machinery if effects of medications or underlying symptoms/condition impair the ability to safely do so.  [x] Comply with ANY/ALL medication fully as prescribed/instructed and report ANY/ALL suspected adverse effects to appropriate health care providers.    Written material has been provided to supplement, augment, and reinforce any discussions and interventions, via the AVS and/or other pre-printed handouts.  Alcohol, Tobacco, and Drug Counseling, as well as applicable resources, has been provided, as warranted.  Shared medical decision making and informed consent are the hallmark and bedrock of good clinical care, and as such have been employed and obtained, respectively, to the degree possible.  Risk Mitigation Strategies, Harm Reduction Techniques, and Safety Netting are important interventions that can reduce acute and chronic risk, and as such have been employed to the degree possible.  Prescription Drug Management entails the review, recommendation, or consideration without recommendation of medications, and as such was employed during the encounter.  Additional Psychoeducation has been provided, as warranted.      -- Discussed, to the  extent possible, diagnosis, risks and benefits of proposed treatment vs alternative treatments vs no treatment, potential side effects of these treatments and the inherent unpredictability of treatment. The patient's ability to understand, participate and engage in a conversation surrounding this was deemed to be: intact and sufficient. The patient expresses understanding of the above and displays the capacity to agree with this treatment given said understanding. Patient also agrees that, currently, the benefits outweigh the risks and consents to treatment at this time.    -- [place]  [facility] site reviewed    09/11/2023 09/11/2023   1  Dextroamp-Amphetamin 30 Mg Tab 45.00  30  Da Gal  [id number]   Wal ([id number])  0   [place]    07/19/2023 07/17/2023   1  Dextroamp-Amphetamin 30 Mg Tab 45.00  30  Da Gal  [id number]   Wal ([id number])  0   [place]    05/22/2023  05/15/2023   1  Dextroamp-Amphetamin 30 Mg Tab 45.00  30  Da Gal  [id number]   Wal ([id number])  0   [place]    04/17/2023 04/17/2023   1  Dextroamp-Amphetamin 30 Mg Tab 45.00  30  Da Gal  [id number]   Wal ([id number])  0   [place]    03/15/2023  03/14/2023   1  Dextroamp-Amphetamin 30 Mg Tab 45.00  30  Da Gal  [id number]   Wal ([id number])  0   [place]    01/17/2023 11/02/2022   1  Dextroamp-Amphetamin 30 Mg Tab 45.00  30  Da Gal  [id number]   Wal ([id number])  0   [place]      DIAGNOSTIC TESTING:     Wt Readings from Last 3 Encounters:   10/10/23 74.8 kg (165 lb)   10/25/22 74.8 kg (165 lb)   05/06/22 74.4 kg (164 lb 0.4 oz)     BP Readings from Last 1 Encounters:   10/10/23 120/80     Pulse Readings from Last 1 Encounters:   10/10/23 81        Glu 85  9/29/2021   HgA1c *   *    Na 137  9/29/2021  Cr 0.9  9/29/2021  BUN 17  9/29/2021    GFR *   *     Alb 4.2  5/6/2022   T Bili 1.3 (H)  5/6/2022   Alk Phos 34 (L)  5/6/2022   AST 25  5/6/2022   ALT 19  5/6/2022     Ammonia *   *   Amylase *   *   Lipase *   *    TSH 1.715  9/29/2021   Free T4 *   *     Prolactin  *   *   CPK *   *   Troponin I *   *     PT *   *   INR *   *     WBC 5.70  11/5/2021   Hgb 15.2  11/5/2021   HCT 44.3  11/5/2021     11/5/2021   ANC 3.8; 66.0;   11/5/2021     Cholesterol 178  9/29/2021   Triglycerides 70  9/29/2021   HDL 61  9/29/2021   .0  9/29/2021     B12 *   *   Folate *   *   Thiamine *   *   Vit D *   *      HIV 1/2 Ag/Ab *   *   Hep C *   *   RPR *   *    Lithium *   *   VPA *   *   Clozapine *   *     Alcohol (Urine) *   *   Benzodiazepines *   *   Barbiturates *   *   Cannabis *   *   Cocaine *   *   Amphetamines *   *   PCP *   *   Opiates *   *   Methadone *   *   Buprenorphine *   *   Fentanyl *   *     Ethanol *   *  PETH *   *   EtG *   *   GGT *   *   MCV 87  11/5/2021    UPT *   *    137 103 17 á 85   4.4 23 0.9      9.6 *   ??   *     5.70 ñ 15.2 á 170    44.3      * á *   *      Results for orders placed or performed in visit on 11/13/13   SCHEDULED EKG 12-LEAD (to Muse)    Collection Time: 11/13/13  3:58 PM    Narrative    Test Reason : 785.1  Vent. Rate : 063 BPM     Atrial Rate : 063 BPM     P-R Int : 148 ms          QRS Dur : 090 ms      QT Int : 388 ms       P-R-T Axes : 036 049 048 degrees     QTc Int : 397 ms  Age and gender specific analysis  Normal sinus rhythm with sinus arrhythmia  Normal ECG  When compared with ECG of 18-JAN-2011 14:20,  No significant change was found  Confirmed by OTIS NG MD ([id number]) on 11/14/2013 4:02:15 PM    Referred By: SAAD TAM           Overread By: OTIS NG MD

## 2024-01-18 DIAGNOSIS — F90.0 ATTENTION DEFICIT HYPERACTIVITY DISORDER (ADHD), PREDOMINANTLY INATTENTIVE TYPE: ICD-10-CM

## 2024-01-18 RX ORDER — DEXTROAMPHETAMINE SACCHARATE, AMPHETAMINE ASPARTATE, DEXTROAMPHETAMINE SULFATE AND AMPHETAMINE SULFATE 7.5; 7.5; 7.5; 7.5 MG/1; MG/1; MG/1; MG/1
TABLET ORAL
Qty: 45 TABLET | Refills: 0 | Status: SHIPPED | OUTPATIENT
Start: 2024-01-18 | End: 2024-03-01 | Stop reason: SDUPTHER

## 2024-03-01 ENCOUNTER — TELEPHONE (OUTPATIENT)
Dept: INTERNAL MEDICINE | Facility: CLINIC | Age: 44
End: 2024-03-01

## 2024-03-01 DIAGNOSIS — F90.0 ATTENTION DEFICIT HYPERACTIVITY DISORDER (ADHD), PREDOMINANTLY INATTENTIVE TYPE: ICD-10-CM

## 2024-03-01 RX ORDER — DEXTROAMPHETAMINE SACCHARATE, AMPHETAMINE ASPARTATE, DEXTROAMPHETAMINE SULFATE AND AMPHETAMINE SULFATE 7.5; 7.5; 7.5; 7.5 MG/1; MG/1; MG/1; MG/1
TABLET ORAL
Qty: 45 TABLET | Refills: 0 | Status: SHIPPED | OUTPATIENT
Start: 2024-03-01 | End: 2024-05-25 | Stop reason: SDUPTHER

## 2024-03-01 NOTE — TELEPHONE ENCOUNTER
----- Message from Sylvia Zacarias sent at 3/1/2024  2:50 PM CST -----  Type:  Sooner Appointment Request    Caller is requesting a sooner appointment.  Caller declined first available appointment listed below.  Caller will not accept being placed on the waitlist and is requesting a message be sent to doctor.  Name of Caller:pt  When is the first available appointment?n/a  Symptoms:Heart issues/overall checkup  Would the patient rather a call back or a response via [facility]? call  Best Call Back Number: [phone]  Additional Information:

## 2024-03-04 ENCOUNTER — TELEPHONE (OUTPATIENT)
Dept: INTERNAL MEDICINE | Facility: CLINIC | Age: 44
End: 2024-03-04
Payer: COMMERCIAL

## 2024-03-04 NOTE — TELEPHONE ENCOUNTER
Attempted to call pt back about heart issues but no response at this time. Left VM to call office when available.

## 2024-03-06 ENCOUNTER — OFFICE VISIT (OUTPATIENT)
Dept: OTOLARYNGOLOGY | Facility: CLINIC | Age: 44
End: 2024-03-06
Payer: COMMERCIAL

## 2024-03-06 VITALS
SYSTOLIC BLOOD PRESSURE: 115 MMHG | BODY MASS INDEX: 25.36 KG/M2 | DIASTOLIC BLOOD PRESSURE: 73 MMHG | WEIGHT: 171.75 LBS | HEART RATE: 60 BPM

## 2024-03-06 DIAGNOSIS — J34.3 HYPERTROPHY OF INFERIOR NASAL TURBINATE: ICD-10-CM

## 2024-03-06 DIAGNOSIS — K21.9 LARYNGOPHARYNGEAL REFLUX (LPR): ICD-10-CM

## 2024-03-06 DIAGNOSIS — D14.0 PAPILLOMA OF VESTIBULE OF NOSE: Primary | ICD-10-CM

## 2024-03-06 DIAGNOSIS — J34.2 DEVIATED NASAL SEPTUM: ICD-10-CM

## 2024-03-06 PROCEDURE — 31575 DIAGNOSTIC LARYNGOSCOPY: CPT | Mod: S$GLB,,, | Performed by: PHYSICIAN ASSISTANT

## 2024-03-06 PROCEDURE — 3078F DIAST BP <80 MM HG: CPT | Mod: CPTII,S$GLB,, | Performed by: PHYSICIAN ASSISTANT

## 2024-03-06 PROCEDURE — 3008F BODY MASS INDEX DOCD: CPT | Mod: CPTII,S$GLB,, | Performed by: PHYSICIAN ASSISTANT

## 2024-03-06 PROCEDURE — 99999 PR PBB SHADOW E&M-EST. PATIENT-LVL III: CPT | Mod: PBBFAC,,, | Performed by: PHYSICIAN ASSISTANT

## 2024-03-06 PROCEDURE — 1159F MED LIST DOCD IN RCRD: CPT | Mod: CPTII,S$GLB,, | Performed by: PHYSICIAN ASSISTANT

## 2024-03-06 PROCEDURE — 99204 OFFICE O/P NEW MOD 45 MIN: CPT | Mod: 25,S$GLB,, | Performed by: PHYSICIAN ASSISTANT

## 2024-03-06 PROCEDURE — 3074F SYST BP LT 130 MM HG: CPT | Mod: CPTII,S$GLB,, | Performed by: PHYSICIAN ASSISTANT

## 2024-03-06 RX ORDER — OMEPRAZOLE 40 MG/1
40 CAPSULE, DELAYED RELEASE ORAL EVERY MORNING
Qty: 90 CAPSULE | Refills: 2 | Status: SHIPPED | OUTPATIENT
Start: 2024-03-06 | End: 2025-03-06

## 2024-03-06 NOTE — PROCEDURES
"Laryngoscopy    Date/Time: 3/6/2024 3:30 PM    Performed by: Johnny Proctor PA-C  Authorized by: Johnny Proctor PA-C    Time out: Immediately prior to procedure a "time out" was called to verify the correct patient, procedure, equipment, support staff and site/side marked as required.    Anesthesia:     Local anesthetic:  Lidocaine 4% and Castro-Synephrine 1/2%    Patient tolerance:  Patient tolerated the procedure well with no immediate complications  Laryngoscopy:     Areas examined:  Nasal cavities, nasopharynx, oropharynx, hypopharynx, larynx and vocal cords    Laryngoscope size:  4 mm  Nose Intranasal:      Mucosa no polyps     Mucosa ulcers not present     No mucosa lesions present     Septum gross deformity (mild right anterior deviation)     Turbinates not enlarged  Nasopharynx:      No mucosa lesions     Adenoids present     Posterior choanae patent     Eustachian tube patent  Larynx/hypopharynx:      No epiglottis lesions     No epiglottis edema     No AE folds lesions     No vocal cord polyps     Equal and normal bilateral     No hypopharynx lesions     No piriform sinus pooling     No piriform sinus lesions     No post cricoid edema     No post cricoid erythema     Erythematous arytenoids and epiglottis  Clear PND from BL posterior ITs  Watery lesion in L nasal vestibule, possible IP    "

## 2024-03-06 NOTE — PROGRESS NOTES
"Subjective:     HPI: Juan Manuel Hdz is a 43 y.o. male with migraines who was self-referred for post-nasal drip.    He reports a growing mass in the left nasal cavity x 1-2 years that he "can feel growing inside of nose." Associated symptoms include nasal congestion.  He denies rhinorrhea, sinus pressure, headache, discolored mucus, or hyposmia.    Patient also reports chronic post nasal drip associated with dysphagia and throat clearing for as long as he could remember.     Current sinonasal medications include none at present.  Reported number of sinus infections requiring antibiotics per year 0  He does not recall previously having allergy testing.  He denies a history of asthma.  He denies a history of reflux symptoms.    He denies a diagnosis of obstructive sleep apnea.   He does not recall a prior history of nasal trauma.  He has not had sinonasal surgery.    He has not had a tonsillectomy.  He is not a tobacco smoker.   He has not had SOB/wheezing from NSAID use.     SNOT-22 score: : (P) 49  NOSE score:: (P) 50%  ETDQ-7 score:: (P) 2.3    Past Medical/Past Surgical History  Past Medical History:   Diagnosis Date    Acute appendicitis 11/21/2017    ADHD (attention deficit hyperactivity disorder)     Allergy     seasonal    AR (allergic rhinitis)     History of psychiatric care     Psychiatric problem     Raynaud's disease without gangrene 2/14/2019    Reactive airway disease     Therapy      He has a past surgical history that includes Closed reduction zygomatic arch fracture; Back surgery (2010); Appendectomy; and achilles tendon rupture (Right).    Family History/Social History  His family history includes Autism in his brother; Autoimmune disease in his mother; Diabetes in his maternal grandfather and maternal grandmother; Heart disease in his father; Hyperlipidemia in his father; Lupus in his mother.  He reports that he has never smoked. He has never been exposed to tobacco smoke. He uses smokeless " tobacco. He reports current alcohol use of about 0.8 standard drinks of alcohol per week. He reports that he does not use drugs.    Allergies/Immunizations  He has No Known Allergies.  Immunization History   Administered Date(s) Administered    COVID-19, vector-nr, rS-Ad26, PF ([employer]) 07/13/2021    Influenza - Quadrivalent - PF *Preferred* (6 months and older) 09/17/2019, 09/29/2021    Tdap 09/17/2019        Medications   azelastine  dextroamphetamine-amphetamine Tab  ketoconazole  omeprazole     Review of Systems     Constitutional: Positive for chills and fatigue.      HENT: Positive for nosebleeds, postnasal drip, sinus pressure and trouble swallowing.      Eyes:  Positive for eye itching and photophobia.     Respiratory:  Positive for cough and snoring.      Cardiovascular:  Positive for chest pain.     Gastrointestinal:  Negative for abdominal pain, acid reflux, constipation, diarrhea, heartburn and vomiting.     Genitourinary: Negative for difficulty urinating, sexual problems and frequent urination.     Musc: Positive for aching muscles.     Skin: Positive for rash.     Allergy: Positive for seasonal allergies.     Endocrine: Positive for cold intolerance and heat intolerance.     Neurological: Positive for headaches.     Hematologic: Negative for bruises/bleeds easily and swollen glands.      Psychiatric: Positive for nervous/anxious.         Objective:     /73 (BP Location: Right arm, Patient Position: Sitting, BP Method: Large (Automatic))   Pulse 60   Wt 77.9 kg (171 lb 11.8 oz)   BMI 25.36 kg/m²        Constitutional:   He appears well-developed and well-nourished. Normal speech.      Head:  Normocephalic and atraumatic. Facial strength is normal.      Ears:    Right Ear: No drainage or swelling. Tympanic membrane is not perforated and not erythematous. No middle ear effusion.   Left Ear: No drainage or swelling. Tympanic membrane is not perforated and not erythematous.  No middle ear  "effusion.     Nose:  No mucosal edema, rhinorrhea, septal deviation or polyps.  No foreign bodies. Turbinate hypertrophy.  Turbinates normal and no turbinate masses.  Right sinus exhibits no maxillary sinus tenderness and no frontal sinus tenderness. Left sinus exhibits no maxillary sinus tenderness and no frontal sinus tenderness.   Inferior portion of L nasal vestibule near septum with verrucous texture at the base and pedunculated at the top. About 3 mm. Dried blood/eschar overlying part.   See photo    Mouth/Throat  Oropharynx clear and moist without lesions or asymmetry, normal uvula midline and lips, teeth, and gums normal. No trismus or mucous membrane lesions. No oropharyngeal exudate, posterior oropharyngeal edema or posterior oropharyngeal erythema. Tonsils present.      Neck:  Phonation normal. No thyroid mass and no thyromegaly present.     He has no cervical adenopathy.     Pulmonary/Chest:   Effort normal.     Psychiatric:   He has a normal mood and affect. His speech is normal and behavior is normal.       Procedure    Flexible laryngoscopy performed.  See procedure note.     R NV     R ET     L nasal vestibule lesion     L MT     L posterior nasal cavity    Hypopharynx/larynx      VF mobility with erythematous arytenoids      Data Reviewed  I personally reviewed the chart, including any outside records, and pertinent data below:    I reviewed the following notes: Internal medicine, Urgent Care    WBC (K/uL)   Date Value   11/05/2021 5.70     Eosinophil % (%)   Date Value   11/05/2021 2.8     Eos # (K/uL)   Date Value   11/05/2021 0.2     Platelets (K/uL)   Date Value   11/05/2021 170     Glucose (mg/dL)   Date Value   09/29/2021 85     No results found for: "IGE"    I independently reviewed the images of the CT head dated 7/25/14. Pertinent sinus findings include overall patent sinuses.  Significant motion artifact..    Assessment & Plan:     1. Papilloma of vestibule of nose  - Discussed with " patient  about obtaining biopsy of mass. Will refer patient to ENT surgeon for further evaluation.  2. Hypertrophy of inferior nasal turbinate  3. Deviated nasal septum   - incidental finding/ may be contributing to nasal obstruction  4. Laryngopharyngeal reflux (LPR)  - Discussed the etiology of LPR and management strategies including nonpharmacologic treatments: eating smaller meals, eating at least 3 hours before bed, elevation of the head of bed at night, avoidance of caffeine, chocolate, nicotine and peppermint, and avoiding tight fitting clothing.    - Will trial PPI   - May consider GI referral if symptoms are refractory to medication management.    He will follow up as needed  I had a discussion with the patient regarding his condition and the further workup and management options.    All questions were answered, and the patient is in agreement with the above.     Disclaimer:  This note may have been prepared utilizing voice recognition software which may result in occasional typographical errors in the text such as sound alike words.   If further clarification is needed, please contact the ENT department of [facility].

## 2024-03-06 NOTE — PATIENT INSTRUCTIONS
"  LARYNGOPHARYNGEAL REFLUX  (SILENT OR ATYPICAL REFLUX)    LPR is a very challenging disease as it includes a vast range of symptoms and difficult to diagnose.      SYMPTOMS  If you have any of the following symptoms you MAY have laryngopharyngeal reflux (LPR):    1. Hoarseness  2. Sore throat  4. throat clearing, sometimes clearing thick mucous  5. chronic cough, especially cough that wakes you up from sleep  6.  "postnasal drip" without the need to blow your nose  7. Globus sensation, like the sensation of something being stuck in the throat  8.  Red, swollen or irritated larynx (voice box)    HOW  Many people with LPR do not have symptoms of heartburn. Compared to the esophagus, the voice box and the back of the throat are significantly more sensitive to the effects of acid and digestive enzymes on surrounding tissue. Acid passing quickly through the esophagus does not have a chance to irritate the area for too long.  However acid that pools in the throat or voice box can cause prolonged irritation resulting in the symptoms of LPR.    DIAGNOSIS  A common procedure performed by an ENT is called flexible laryngoscopy.  A thin tube is inserted through the nose in order to visualize the larynx (the voice box). This method can detect abnormalities in the voice box ranging from polyps to laryngeal cancer.  This can also reveal signs of LPR, but is not 100% reliable.      If symptoms persist despite medical treatment listed below, further testing is needed.  Three commonly used tests are: a swallowing study; a direct look at the stomach and esophagus through an endoscope, and; an esophageal pH test.  Further testing is usually coordinated with a gastroenterologist.     TREATMENT  Lifestyle changes  1. Do not smoke.  Smoking will worsen reflux.    2. Avoid eating at least 2-3 hours prior to bedtime.  Try to avoid very large meals at night.    4. Weight loss.  For patient's with recent weight gain, shedding a few pounds " is all that is required to improve reflux.    5. Avoid reflux triggers. These can worsen acid in the stomach or even cause the esophagus muscles to relax: caffeine, soft drinks, acidic foods (tomato, lots of fruit) mints, alcoholic beverages, particularly at night, cheese, fried foods, spicy foods, eggs, and chocolate.    6. Sleep with the head of bed elevated at least 6 inches.    Consider reading the book Dropping Acid by Peter Rockwell MD.  This has information to help choose meals with less acid.     Other Treatments:  All natural remedies include Reflux Gourmet (and Reflux Raft) which create a temporary protective barrier in your stomach to prevent reflux into your esophagus. This can be an effective therapy without the side effects of normal acid reducing medications and was also developed by laryngologists (LPR specialists).   Speech Therapy: education and techniques aimed at helping you control the cough     Medications  Take over-the-counter (OTC) medications, including antacids, such as Gaviscon Advance (others include Tums®, Maalox®, or Mylanta) as needed  Prescription and OTC stomach acid reducers: H2 blockers such as famotidine (Pepcid®), cimetidine (Tagamet®), ranitidine (Zantac®) OR proton pump inhibitors (PPIs), such as omeprazole (Prilosec®), pantoprazole (Protonix®), and esomeprazole (Nexium®).  Most patients will begin to notice some relief in their symptoms about 2-4 weeks after starting an acid reducing medication; however it is generally recommended the medication should be continued for at least 2 months. If the symptoms completely resolve, the medication can then be tapered.  Some people will remain symptom free while others may have relapses which required treatment again.

## 2024-03-13 ENCOUNTER — OFFICE VISIT (OUTPATIENT)
Dept: OTOLARYNGOLOGY | Facility: CLINIC | Age: 44
End: 2024-03-13
Payer: COMMERCIAL

## 2024-03-13 VITALS
BODY MASS INDEX: 25.17 KG/M2 | WEIGHT: 170.44 LBS | HEART RATE: 86 BPM | DIASTOLIC BLOOD PRESSURE: 73 MMHG | SYSTOLIC BLOOD PRESSURE: 138 MMHG

## 2024-03-13 DIAGNOSIS — D14.0 PAPILLOMA OF VESTIBULE OF NOSE: Primary | ICD-10-CM

## 2024-03-13 PROCEDURE — 1159F MED LIST DOCD IN RCRD: CPT | Mod: CPTII,S$GLB,, | Performed by: OTOLARYNGOLOGY

## 2024-03-13 PROCEDURE — 88305 TISSUE EXAM BY PATHOLOGIST: CPT | Performed by: PATHOLOGY

## 2024-03-13 PROCEDURE — 99214 OFFICE O/P EST MOD 30 MIN: CPT | Mod: 25,S$GLB,, | Performed by: OTOLARYNGOLOGY

## 2024-03-13 PROCEDURE — 3075F SYST BP GE 130 - 139MM HG: CPT | Mod: CPTII,S$GLB,, | Performed by: OTOLARYNGOLOGY

## 2024-03-13 PROCEDURE — 1160F RVW MEDS BY RX/DR IN RCRD: CPT | Mod: CPTII,S$GLB,, | Performed by: OTOLARYNGOLOGY

## 2024-03-13 PROCEDURE — 99999 PR PBB SHADOW E&M-EST. PATIENT-LVL III: CPT | Mod: PBBFAC,,, | Performed by: OTOLARYNGOLOGY

## 2024-03-13 PROCEDURE — 3078F DIAST BP <80 MM HG: CPT | Mod: CPTII,S$GLB,, | Performed by: OTOLARYNGOLOGY

## 2024-03-13 PROCEDURE — 30100 INTRANASAL BIOPSY: CPT | Mod: S$GLB,,, | Performed by: OTOLARYNGOLOGY

## 2024-03-13 PROCEDURE — 3008F BODY MASS INDEX DOCD: CPT | Mod: CPTII,S$GLB,, | Performed by: OTOLARYNGOLOGY

## 2024-03-13 PROCEDURE — 88305 TISSUE EXAM BY PATHOLOGIST: CPT | Mod: 26,,, | Performed by: PATHOLOGY

## 2024-03-13 RX ORDER — MUPIROCIN 20 MG/G
OINTMENT TOPICAL 2 TIMES DAILY
Qty: 1 G | Refills: 1 | Status: SHIPPED | OUTPATIENT
Start: 2024-03-13 | End: 2024-03-20

## 2024-03-13 NOTE — PROGRESS NOTES
History of Present Illness:   Juan Manuel Hdz is a 43 y.o. year old male evaluated in the Otolaryngology-Head and Neck Surgery Clinic at [facility]. The patient was referred by YVES Turner  for evaluation of nasal papilloma biopsy.  Patient reports he has had a lesion to the left side of the nostril that has been present for at least 1 year with slow growth.  Reports occasional bleeding.  He has nasal obstruction on that side.  He had no episodes of flare up with erythema of the skin or drainage in no known prior infections.  He has not sure but probably did manipulate the lesion at some point.           Past Medical/Surgical History  Past Medical History:   Diagnosis Date    Acute appendicitis 11/21/2017    ADHD (attention deficit hyperactivity disorder)     Allergy     seasonal    AR (allergic rhinitis)     History of psychiatric care     Psychiatric problem     Raynaud's disease without gangrene 2/14/2019    Reactive airway disease     Therapy      His  has a past surgical history that includes Closed reduction zygomatic arch fracture; Back surgery (2010); Appendectomy; and achilles tendon rupture (Right).     Past Family/Social History  His family history includes Autism in his brother; Autoimmune disease in his mother; Diabetes in his maternal grandfather and maternal grandmother; Heart disease in his father; Hyperlipidemia in his father; Lupus in his mother.  He  reports that he has never smoked. He has never been exposed to tobacco smoke. He uses smokeless tobacco. He reports current alcohol use of about 0.8 standard drinks of alcohol per week. He reports that he does not use drugs.     Medications/Allergies/Immunizations  His current medication(s) include:   Current Outpatient Medications   Medication Sig Dispense Refill    azelastine (ASTELIN) 137 mcg (0.1 %) nasal spray 1 spray (137 mcg total) by Nasal route 2 (two) times daily as needed for Rhinitis. 30 mL 0    dextroamphetamine-amphetamine  (ADDERALL) 30 mg Tab Take 30 mg by mouth (1 tab) in the morning and 15 mg by mouth (1/2 tab) in the afternoon 45 tablet 0    ketoconazole (NIZORAL) 2 % cream Apply topically once daily. Apply to affected toenails for 6-12 months. 60 g 6    omeprazole (PRILOSEC) 40 MG capsule Take 1 capsule (40 mg total) by mouth every morning. 90 capsule 2    mupirocin (BACTROBAN) 2 % ointment Apply topically 2 (two) times daily. for 7 days 1 g 1     No current facility-administered medications for this visit.        Allergies: Patient has no known allergies.     Immunizations:   Immunization History   Administered Date(s) Administered    COVID-19, vector-nr, rS-Ad26, PF ([employer]) 07/13/2021    Influenza - Quadrivalent - PF *Preferred* (6 months and older) 09/17/2019, 09/29/2021    Tdap 09/17/2019         Review of Systems   Constitutional: Negative for fever, weight loss and weight gain.  Skin: Negative for rash, itchiness, dryness  HENT:  As per HPI  Cardiovascular: Negative for chest pain and dyspnea on exertion .   Respiratory: Is not experiencing shortness of breath.   Gastrointestinal: Negative for nausea and vomiting.   Neurological: Negative for headaches.   Lymph/Heme: Negative for lymphadenopathy or easy bruising  Musculoskeletal: Negative for joint or muscle pain  Psychiatric: The patient is not nervous/anxious.        All other systems are negative except for that listed in the HPI.      PHYSICAL EXAM:   Vital Signs:  /73 (BP Location: Left arm, Patient Position: Sitting)   Pulse 86   Wt 77.3 kg (170 lb 6.7 oz)   BMI 25.17 kg/m²      General:  Well-developed, well-nourished  Communication and Voice:  Clear pitch and clarity  Hearing: Hearing adequate for verbal communication bilaterally   Inspection:  Normocephalic and atraumatic without mass or lesion  Palpation:  Facial skeleton intact without bony stepoffs  Parotid Glands:  No mass or tenderness  Facial Strength:  Facial motility symmetric and full  bilaterally  Pinna:  External ear intact and fully developed  External canal:  Canal is patent with intact skin  Tympanic Membrane:  Clear and mobile  External nose:  No scar or anatomic deformity  Internal Nose:  Septum intact and midline.  With left inferior caudal papillomatous lesion a proximally 6-7 mm  No edema, polyp, or rhinorrhea.  TMJ:  No pain to palpation with full mobility  Oral cavity, Lips, Teeth, and Gums:  Mucosa and teeth intact and viable, No lesions, masses or ulcers  Oropharynx: No erythema or exudate, no masses or ulcerations, non-obstructive tonsils  Nasopharynx:  No mass or lesion with intact mucosa  Hypopharynx:  Not well visualized secondary to gagging  Larynx:  Not well visualized secondary to gagging  Neck, Trachea, Lymphatics:  Midline trachea without mass or lesion, no lymphadenopathy  Thyroid:  No mass or nodularity  Eyes: No nystagmus with equal extraocular motion bilaterally  Neuro/Psych/Balance: Patient oriented and appropriate in interaction;  Appropriate mood and affect;  Gait is intact with no imbalance; Cranial nerves I-XII are intact  Respiratory effort:  Equal inspiration and expiration without stridor  Peripheral Vascular:  Warm extremities with equal pulses     Procedure:  Nasal lesion excisional biopsy   Indications:  Nasal papilloma   Surgeon: Jose Sosa MD  Procedure note/findings: After informed discussion of the risks, benefits, and alternatives after indications as noted above, left nasal papilloma injected with a proximally 1-1.25 cc of 1% lidocaine with [id number] of epinephrine.  Eleven blade was used to truncate the lesion at its base of the septal mucosa.  Lesion was sent off the specimen and silver nitrate was used to obtain hemostasis.  He tolerated this well.            ASSESSMENT:   1. Papilloma of vestibule of nose            PLAN:   Nasal papilloma biopsied in the clinic today.  I will follow up on pathology.  Bactroban given for 7-10 days post biopsy Follow  up with me p.r.n.      I believe that Mr. Hdz has a good understanding of the issues involved and I answered all of his questions.     DISCLAIMER: This note was prepared with [employer] voice recognition transcription software. Garbled syntax, mangled pronouns, and other bizarre constructions may be attributed to that software system. While efforts were made to correct any mistakes made by this voice recognition program, some errors and/or omissions may remain in the note that were missed when the note was originally created.

## 2024-03-18 LAB
FINAL PATHOLOGIC DIAGNOSIS: NORMAL
GROSS: NORMAL
Lab: NORMAL

## 2024-05-25 DIAGNOSIS — F90.0 ATTENTION DEFICIT HYPERACTIVITY DISORDER (ADHD), PREDOMINANTLY INATTENTIVE TYPE: ICD-10-CM

## 2024-05-25 RX ORDER — DEXTROAMPHETAMINE SACCHARATE, AMPHETAMINE ASPARTATE, DEXTROAMPHETAMINE SULFATE AND AMPHETAMINE SULFATE 7.5; 7.5; 7.5; 7.5 MG/1; MG/1; MG/1; MG/1
TABLET ORAL
Qty: 45 TABLET | Refills: 0 | Status: SHIPPED | OUTPATIENT
Start: 2024-05-25

## 2024-07-01 DIAGNOSIS — F90.0 ATTENTION DEFICIT HYPERACTIVITY DISORDER (ADHD), PREDOMINANTLY INATTENTIVE TYPE: ICD-10-CM

## 2024-07-02 ENCOUNTER — OFFICE VISIT (OUTPATIENT)
Dept: PODIATRY | Facility: CLINIC | Age: 44
End: 2024-07-02
Payer: COMMERCIAL

## 2024-07-02 VITALS
BODY MASS INDEX: 25.24 KG/M2 | SYSTOLIC BLOOD PRESSURE: 121 MMHG | HEIGHT: 69 IN | WEIGHT: 170.44 LBS | DIASTOLIC BLOOD PRESSURE: 79 MMHG | HEART RATE: 74 BPM

## 2024-07-02 DIAGNOSIS — L60.9 DISEASE OF NAIL: Primary | ICD-10-CM

## 2024-07-02 PROCEDURE — 99999 PR PBB SHADOW E&M-EST. PATIENT-LVL III: CPT | Mod: PBBFAC,,, | Performed by: STUDENT IN AN ORGANIZED HEALTH CARE EDUCATION/TRAINING PROGRAM

## 2024-07-02 RX ORDER — DEXTROAMPHETAMINE SACCHARATE, AMPHETAMINE ASPARTATE, DEXTROAMPHETAMINE SULFATE AND AMPHETAMINE SULFATE 7.5; 7.5; 7.5; 7.5 MG/1; MG/1; MG/1; MG/1
TABLET ORAL
Qty: 45 TABLET | Refills: 0 | OUTPATIENT
Start: 2024-07-02

## 2024-07-02 NOTE — PROGRESS NOTES
Subjective:     Patient ID: Juan Manuel Hdz is a 43 y.o. male.    Chief Complaint: Fungus (Both feet big toes)    Juan Manuel is a 43 y.o. male who presents to the clinic complaining of thick and discolored toenails on both feet, great toes. Juan Manuel is inquiring about treatment options. Relates has been very difficult to get rid of fungus and causes him mild discomfort. Would like to get them permanently removed.     Review of Systems   Constitutional: Negative for chills, decreased appetite, diaphoresis and fever.   HENT:  Negative for congestion and hearing loss.    Cardiovascular:  Negative for chest pain, claudication, leg swelling and syncope.   Respiratory:  Negative for cough and shortness of breath.    Skin:  Positive for dry skin and nail changes. Negative for color change, flushing, itching, poor wound healing and rash.   Musculoskeletal:  Positive for arthritis and back pain. Negative for joint swelling.   Gastrointestinal:  Negative for nausea and vomiting.   Neurological:  Negative for focal weakness, numbness, paresthesias and weakness.   Psychiatric/Behavioral:  Negative for altered mental status. The patient is not nervous/anxious.         Objective:     Physical Exam  Constitutional:       General: He is not in acute distress.     Appearance: Normal appearance. He is well-developed. He is not diaphoretic.   Cardiovascular:      Comments: Dorsalis pedis and posterior tibial pulses are within normal limits. Skin temperature is within normal limits. Toes are cool to touch and feet are warm proximally. Hair growth is within normal limits. Skin is normotrophic and without hyperpigmentation. No edema noted. No varicosities or spider veins noted, bilaterally.   Musculoskeletal:         General: No tenderness.      Comments: Adequate joint range of motion without pain, limitation, nor crepitation to foot and ankle joints. Muscle strength is 5/5 in all groups bilaterally.       Feet:      Right foot:       Skin integrity: Dry skin present. No ulcer, blister, erythema or warmth.      Left foot:      Skin integrity: Dry skin present. No ulcer, blister, erythema or warmth.   Skin:     General: Skin is warm and dry.      Capillary Refill: Capillary refill takes less than 2 seconds.      Comments: Skin is warm and dry, no acute signs of infection noted bilaterally      Bilateral hallux toenails are thickened by 2-4 mm's, dystrophic, and are darkened in coloration with subungual fungal debris, mild tenderness on palpation. Remainder of toenails are of normal morphology.     Otherwise, no open wounds, macerations or hyperkeratotic lesions, bilaterally            Neurological:      Mental Status: He is alert and oriented to person, place, and time.      Sensory: No sensory deficit.      Motor: No abnormal muscle tone.      Comments: Light touch within normal limits.    Psychiatric:         Mood and Affect: Mood normal.         Behavior: Behavior normal.         Thought Content: Thought content normal.           Assessment:      Encounter Diagnosis   Name Primary?    Disease of nail Yes     Plan:     Juan Manuel was seen today for fungus.    Diagnoses and all orders for this visit:    Disease of nail      I counseled the patient on his conditions, their implications and medical management.  Discussed importance of keeping feet dry and changing socks and shoes on a regular basis to prevent spread of toenail fungus. Discussed treatment options for fungal toenails including topical home remedies, topical over the counter and prescription medications as well as oral prescription medications and laser treatment. All pros and cons discussed of each treatment. Patient elects for topical treatment. He requests to have them permanently removed. Plan for procedure during following visit.   I advised him on keeping nails neatly trimmed, removing all sharp and loose edges, filing the nail as necessary to prevent damage to nail plate and  prevent unnecessary pulling of toenail. Also advised to avoid tight fitting shoes to prevent pressure related issues. Recommend shoes with wide toe box or open toed shoe to reduce pressure.   Plan for follow up for procedure

## 2024-07-03 ENCOUNTER — PATIENT MESSAGE (OUTPATIENT)
Dept: PSYCHIATRY | Facility: CLINIC | Age: 44
End: 2024-07-03
Payer: COMMERCIAL

## 2024-07-03 ENCOUNTER — OFFICE VISIT (OUTPATIENT)
Dept: PSYCHIATRY | Facility: CLINIC | Age: 44
End: 2024-07-03
Payer: COMMERCIAL

## 2024-07-03 VITALS
WEIGHT: 155.31 LBS | HEART RATE: 97 BPM | SYSTOLIC BLOOD PRESSURE: 124 MMHG | DIASTOLIC BLOOD PRESSURE: 70 MMHG | BODY MASS INDEX: 22.94 KG/M2

## 2024-07-03 DIAGNOSIS — F90.0 ATTENTION DEFICIT HYPERACTIVITY DISORDER (ADHD), PREDOMINANTLY INATTENTIVE TYPE: Primary | ICD-10-CM

## 2024-07-03 DIAGNOSIS — F41.9 ANXIETY: ICD-10-CM

## 2024-07-03 PROCEDURE — 99999 PR PBB SHADOW E&M-EST. PATIENT-LVL II: CPT | Mod: PBBFAC,,,

## 2024-07-03 RX ORDER — DEXTROAMPHETAMINE SACCHARATE, AMPHETAMINE ASPARTATE, DEXTROAMPHETAMINE SULFATE AND AMPHETAMINE SULFATE 7.5; 7.5; 7.5; 7.5 MG/1; MG/1; MG/1; MG/1
TABLET ORAL
Qty: 45 TABLET | Refills: 0 | Status: SHIPPED | OUTPATIENT
Start: 2024-07-03

## 2024-07-03 NOTE — PROGRESS NOTES
STAFF NOTE    The chart was reviewed and the case was discussed, including the assessment and management plan.  I agree with the overall findings, with corrections or clarifications, if any, noted herein.    **    David Kelly MD  Board Certification: Psychiatry and Addiction Medicine

## 2024-07-03 NOTE — PROGRESS NOTES
274}        ESTABLISHED VISIT: PSYCHIATRY     ASSESSMENT AND PLAN:     DIAGNOSES & PROBLEMS:  ADHD  R/o adjustment disorder, with anxiety      In Summary:  43M w/ h/o ADHD, well-controlled on current regimen, reporting recent irritability and interest in therapy.     Plan:  - continue Adderall 30 mg qAM + 15 mg in afternoon  denies side effects  refill ordered today   - encouraged to take Adderall consistently (including weekends) and see helpful for irritability  - discussed considering change to XR formulation of stimulant if irritability is a persistent issue  - LA  reviewed  - will refer to short-term psychotherapy program (STeP) for mood, anxiety, stress, interpersonal issues  - will refer to psychoeducational courses (i.e. Stress Mgmt 101) when they resume in Sep   - discussed how psychotherapy works  - provided handout on Automatic Negative Thoughts (ANTs)  - discussed sleep hygiene, provided handout  - discussed medication for mood and anxiety  will try therapy and defer additional medication at this time  - RTC in 1 mo, at pt's request       COURSE: chronic  CURRENT CONDITION: status quo unchanged, overall stable, at or near baseline  - continue psychotropic regimen as prescribed  - tolerating medication(s) well with no significant adverse effects noted  - follow with primary care provider for routine health maintenance and management of medical co-morbidities  - defer non-psychiatric medication(s) and management to the current primary and/or specialist provider(s) of record  - monitor for symptoms of cardiac dysfunction       INTERVAL HISTORY AND REVIEW OF SYSTEMS:     Juan Manuel Hdz is a 43 y.o. patient seen for a follow up psychiatric evaluation.  An interval history of the presenting illness (HPI) was obtained, and a pertinent psychiatric and medical review of systems was performed.    - adderall helping a lot w/ attention and mood   - has not been taking adderall consistently on  "weekends  - issues w/ mood off-and-on over past several mo  - feels that he "can be 2 diff people sometimes"  - irritable lately, making "issues out of little things"  - mood shifts within hours   - "getting into it" over little things w/ wife recently  - recent disagreements w/ friends, they picked someone else for their [employer] team  - notices that this irritability occurs mostly when hasn't taken adderall that day  - sleeping less than normal, staying up later, does not feel it is related to med, able to fall asleep when wants to  - discussed sleep hygiene, provided handout  - busy b/w work and parenting (5 y.o. son), does not have much local family support for childcare  - denies anhedonia  - has some guilt about being irritable toward wife  - having some "negative thoughts," thinking the worst thing will happen  - discussed automatic negative thoughts (ANTS) and catastrophizing, provided handout  - discussed how this may be a focus of therapy  - interested in therapy  - discussed STeP (short-term psychotherapy) for mood, anxiety, stress      Psychotherapy:  Participant(s): patient  Therapeutic intervention type: psychoeducation, CBT, supportive  Target symptoms addressed: irritability, anxiety, interpersonal conflicts  Notes: discussed ANTs, catastrophizing, stress management, sleep hygiene  Pt's progress toward goals: initial session      Global Subjective Rating:  fair, w/ some increased irritability compared to last appointment (based on chart review)    [x] Y  [] N  sleep disturbance: 5-6 h/night, staying up later (until midnight-0200), no trouble falling/staying asleep  [] Y  [x] N  appetite/weight change: **    [] Y  [x] N  fatigue/anergia: some AM tiredness but otherwise normal  [x] Y  [] N  impairment in focus/concentration: *but reports it has been better than it ever has been*       [] Y  [x] N  depression: **     [x] Y  [] N  anxiety/worry: **  worries about "anything" " "including minor issues (irritability)    [] Y  [x] N  somatically preoccupied: **   [x] Y  [] N  dysregulated mood/behavior: **   irritability  [] Y  [x] N  manic symptomatology: **   negative for rapid speech, grandiosity, increased goal-directed activity  [] Y  [x] N  psychosis: **   Negative for: paranoia, hallucinations    [] Y  [x] N  pain: **    [] Y  [x] N  cardiac symptoms: **  Negative for: tachycardia or palpitations  [] Y  [x] N  abnormal movements: **   Negative for: tics or tremor         PERTINENT PAST HISTORY:     Initiated treatment with me in March 2009  Diagnosed w/ ADHD in 7th or 8th grade, but problems w/ school as long as he can remember. Took Ritalin through  - stopped in 2000.     PAST PSYCHOTROPIC TRIALS:  Ritalin - helpful with concentration but some mild upset stomach and "zombie like feeling"      EXAMINATION:     Vitals:  /70   Pulse 97   Wt 70.5 kg (155 lb 5 oz)   BMI 22.94 kg/m²     MENTAL STATUS EXAMINATION:  General Appearance: ** adequately groomed, appropriately dressed, in no apparent distress  Behavior: ** cooperative, under good behavioral control  Involuntary Movements and Motor Activity: ** no tics or tremors, no abnormal involuntary movements noted, no psychomotor agitation or retardation  Speech and Language: ** conversational, spontaneous, speaks and understands English proficiently  Mood: irritable  Affect: ** constricted  Thought Process and Associations: ** somewhat ruminative, with no loosening of associations  Thought Content and Perceptions: ** no hallucinations or paranoia, no evidence of psychosis  Sensorium: ** alert and oriented, with clear sensorium  Recent and Remote Memory: ** grossly intact, no significant impairments noted  Attention and Concentration: ** intact to conversation  Fund of Knowledge: ** grossly intact, used appropriate vocabulary, no significant deficits noted  Insight: ** fair, demonstrates awareness of " condition/situation  Judgment: ** intact, behavior is adequate/appropriate given the circumstances  Reliability: The patient is deemed to be a reliable and factually accurate historian. **      RISK MANAGEMENT:     Risk Parameters:  I[]I Y  I[x]I N  I[]I U  I[]I A  Suicidal Ideation/Behavior: **   I[]I Y  I[x]I N  I[]I U  I[]I A  Homicidal Ideation/Behavior: **  I[]I Y  I[x]I N  I[]I U  I[]I A  Violence: **  I[]I Y  I[x]I N  I[]I U  I[]I A  Self-Injurious Behavior: **    I[]I Y  I[x]I N  I[]I U  I[]I A  I[]I N/A  Minimization of Risk Parameters Suspected/Evident: **  I[]I Y  I[x]I N  I[]I U  I[]I A  I[]I N/A  Exaggeration of Risk Parameters Suspected/Evident: **     The patient was able to satisfactorily contract for safety and was noted to be future oriented.  Protective factors were identified.    Current risk is judged to be:   I[x]I Low    I[]I Moderate   I[]I High    [] Y  [x] N  I[]I U  I[]I N/A  Danger to Self:   [] Y  [x] N  I[]I U  I[]I N/A  Danger to Others:   [] Y  [x] N  I[]I U  I[]I N/A  Grave Disability:         Phillip Tolliver MD  [facility], PGY-III          BILLING BY TIME:  Juan Manuel Hdz was seen in clinic today for psychiatric follow-up.    I spent a total of 27 min on Juan Manuel Hdz's medical evaluation and management.    E&M Billing Code: 29795     ADD-ON PSYCHOTHERAPY BILLING:    Time Statement:  I spent a total of 20 min face-to-face on psychotherapy w/ Juan Manuel Hdz.    +Add-on therapy code to E&M:    (x)  +98551 (30 min, add-on psychotherapy to E/M) 16-37 min actual time spent   ( ) +82400 (45 min, add-on psychotherapy to E/M) 38-52 min actual time spent  ( )  +13807 (60 min, add-on psychotherapy to E/M) 53+    min actual time spent        MANAGEMENT:     I[]I Y = Yes / Present / Endorses.  I[]I N = No / Absent / Denies.  I[]I U = Unknown / Unable to Assess / Unwilling to Participate.  I[]I A = Ambiguity Exists / Accuracy Uncertain.  I[]I D =  Denial or Minimization is Suspected/Evident.  I[]I N/A = Non-Applicable.    Complexity (level) of medical decision making employed in the encounter: MODERATE  The total time spent on the date of the encounter: 20 minutes    Chart Review: Available documentation has been reviewed, and pertinent elements of the chart have been incorporated into this evaluation where appropriate.  Last [facility] encounter with me: 3/16/2023.    [x] In cases of emergencies (e.g. SI/HI resulting in danger to self or others, functioning deteriorates to the level of grave disability), call 911 or [phone], or present to the emergency department for immediate assistance.  [x] Patient should not operate a motor vehicle or heavy machinery if effects of medications or underlying symptoms/condition impair the ability to safely do so.  [x] Comply with ANY/ALL medication fully as prescribed/instructed and report ANY/ALL suspected adverse effects to appropriate health care providers.    Written material has been provided to supplement, augment, and reinforce any discussions and interventions, via the AVS and/or other pre-printed handouts.  Alcohol, Tobacco, and Drug Counseling, as well as applicable resources, has been provided, as warranted.  Shared medical decision making and informed consent are the hallmark and bedrock of good clinical care, and as such have been employed and obtained, respectively, to the degree possible.  Risk Mitigation Strategies, Harm Reduction Techniques, and Safety Netting are important interventions that can reduce acute and chronic risk, and as such have been employed to the degree possible.  Prescription Drug Management entails the review, recommendation, or consideration without recommendation of medications, and as such was employed during the encounter.  Additional Psychoeducation has been provided, as warranted.      -- Discussed, to the extent possible, diagnosis, risks and benefits of proposed treatment vs alternative  treatments vs no treatment, potential side effects of these treatments and the inherent unpredictability of treatment. The patient's ability to understand, participate and engage in a conversation surrounding this was deemed to be: intact and sufficient. The patient expresses understanding of the above and displays the capacity to agree with this treatment given said understanding. Patient also agrees that, currently, the benefits outweigh the risks and consents to treatment at this time.    -- [place]/MS  [facility]WARE site reviewed    09/11/2023 09/11/2023   1  Dextroamp-Amphetamin 30 Mg Tab 45.00  30  Da Gal  [id number]   Wal ([id number])  0   [place]    07/19/2023 07/17/2023   1  Dextroamp-Amphetamin 30 Mg Tab 45.00  30  Da Gal  [id number]   Wal ([id number])  0   [place]    05/22/2023  05/15/2023   1  Dextroamp-Amphetamin 30 Mg Tab 45.00  30  Da Gal  [id number]   Wal ([id number])  0   [place]    04/17/2023 04/17/2023   1  Dextroamp-Amphetamin 30 Mg Tab 45.00  30  Da Gal  [id number]   Wal ([id number])  0   [place]    03/15/2023  03/14/2023   1  Dextroamp-Amphetamin 30 Mg Tab 45.00  30  Da Gal  [id number]   Wal ([id number])  0   [place]    01/17/2023 11/02/2022   1  Dextroamp-Amphetamin 30 Mg Tab 45.00  30  Da Gal  [id number]   Wal ([id number])  0   [place] Ins  [place]      DIAGNOSTIC TESTING:     Wt Readings from Last 3 Encounters:   07/03/24 70.5 kg (155 lb 5 oz)   07/02/24 77.3 kg (170 lb 6.7 oz)   03/13/24 77.3 kg (170 lb 6.7 oz)     BP Readings from Last 1 Encounters:   07/03/24 124/70     Pulse Readings from Last 1 Encounters:   07/03/24 97        Glu 85  9/29/2021   HgA1c *   *    Na 137  9/29/2021  Cr 0.9  9/29/2021  BUN 17  9/29/2021    GFR *   *     Alb 4.2  5/6/2022   T Bili 1.3 (H)  5/6/2022   Alk Phos 34 (L)  5/6/2022   AST 25  5/6/2022   ALT 19  5/6/2022     Ammonia *   *   Amylase *   *   Lipase *   *    TSH 1.715  9/29/2021   Free T4 *   *     Prolactin *   *   CPK *   *   Troponin I *   *     PT *   *   INR *   *      WBC 5.70  11/5/2021   Hgb 15.2  11/5/2021   HCT 44.3  11/5/2021     11/5/2021   ANC 3.8; 66.0;   11/5/2021     Cholesterol 178  9/29/2021   Triglycerides 70  9/29/2021   HDL 61  9/29/2021   .0  9/29/2021     B12 *   *   Folate *   *   Thiamine *   *   Vit D *   *      HIV 1/2 Ag/Ab *   *   Hep C *   *   RPR *   *    Lithium *   *   VPA *   *   Clozapine *   *     Alcohol (Urine) *   *   Benzodiazepines *   *   Barbiturates *   *   Cannabis *   *   Cocaine *   *   Amphetamines *   *   PCP *   *   Opiates *   *   Methadone *   *   Buprenorphine *   *   Fentanyl *   *     Ethanol *   *  PETH *   *   EtG *   *   GGT *   *   MCV 87  11/5/2021    UPT *   *    137 103 17 á 85   4.4 23 0.9      9.6 *   ??   *     5.70 ñ 15.2 á 170    44.3      * á *   *      Results for orders placed or performed in visit on 11/13/13   SCHEDULED EKG 12-LEAD (to Muse)    Collection Time: 11/13/13  3:58 PM    Narrative    Test Reason : 785.1  Vent. Rate : 063 BPM     Atrial Rate : 063 BPM     P-R Int : 148 ms          QRS Dur : 090 ms      QT Int : 388 ms       P-R-T Axes : 036 049 048 degrees     QTc Int : 397 ms  Age and gender specific analysis  Normal sinus rhythm with sinus arrhythmia  Normal ECG  When compared with ECG of 18-JAN-2011 14:20,  No significant change was found  Confirmed by OTIS NG MD ([id number]) on 11/14/2013 4:02:15 PM    Referred By: SAAD TAM           Overread By: OTIS NG MD

## 2024-07-08 ENCOUNTER — PATIENT MESSAGE (OUTPATIENT)
Dept: PSYCHIATRY | Facility: CLINIC | Age: 44
End: 2024-07-08
Payer: COMMERCIAL

## 2024-07-18 ENCOUNTER — OFFICE VISIT (OUTPATIENT)
Dept: PODIATRY | Facility: CLINIC | Age: 44
End: 2024-07-18
Payer: COMMERCIAL

## 2024-07-18 VITALS
HEART RATE: 93 BPM | BODY MASS INDEX: 23.02 KG/M2 | SYSTOLIC BLOOD PRESSURE: 101 MMHG | DIASTOLIC BLOOD PRESSURE: 69 MMHG | HEIGHT: 69 IN | WEIGHT: 155.44 LBS

## 2024-07-18 DIAGNOSIS — L60.9 DISEASE OF NAIL: Primary | ICD-10-CM

## 2024-07-18 DIAGNOSIS — L60.0 INGROWN NAIL: ICD-10-CM

## 2024-07-18 PROCEDURE — 99999 PR PBB SHADOW E&M-EST. PATIENT-LVL III: CPT | Mod: PBBFAC,,, | Performed by: STUDENT IN AN ORGANIZED HEALTH CARE EDUCATION/TRAINING PROGRAM

## 2024-07-18 PROCEDURE — 1159F MED LIST DOCD IN RCRD: CPT | Mod: CPTII,S$GLB,, | Performed by: STUDENT IN AN ORGANIZED HEALTH CARE EDUCATION/TRAINING PROGRAM

## 2024-07-18 PROCEDURE — 3078F DIAST BP <80 MM HG: CPT | Mod: CPTII,S$GLB,, | Performed by: STUDENT IN AN ORGANIZED HEALTH CARE EDUCATION/TRAINING PROGRAM

## 2024-07-18 PROCEDURE — 99214 OFFICE O/P EST MOD 30 MIN: CPT | Mod: 25,S$GLB,, | Performed by: STUDENT IN AN ORGANIZED HEALTH CARE EDUCATION/TRAINING PROGRAM

## 2024-07-18 PROCEDURE — 3008F BODY MASS INDEX DOCD: CPT | Mod: CPTII,S$GLB,, | Performed by: STUDENT IN AN ORGANIZED HEALTH CARE EDUCATION/TRAINING PROGRAM

## 2024-07-18 PROCEDURE — 3074F SYST BP LT 130 MM HG: CPT | Mod: CPTII,S$GLB,, | Performed by: STUDENT IN AN ORGANIZED HEALTH CARE EDUCATION/TRAINING PROGRAM

## 2024-07-18 PROCEDURE — 11750 EXCISION NAIL&NAIL MATRIX: CPT | Mod: T5,S$GLB,, | Performed by: STUDENT IN AN ORGANIZED HEALTH CARE EDUCATION/TRAINING PROGRAM

## 2024-07-18 RX ORDER — TRAMADOL HYDROCHLORIDE 50 MG/1
50 TABLET ORAL EVERY 6 HOURS
Qty: 24 TABLET | Refills: 0 | Status: SHIPPED | OUTPATIENT
Start: 2024-07-18

## 2024-07-18 RX ORDER — MUPIROCIN 20 MG/G
OINTMENT TOPICAL 3 TIMES DAILY
Qty: 22 G | Refills: 2 | Status: SHIPPED | OUTPATIENT
Start: 2024-07-18

## 2024-07-18 NOTE — PROCEDURES
Nail Removal    Date/Time: 7/18/2024 8:30 AM    Performed by: Nina Johns DPM  Authorized by: Nina Johns DPM    Consent Done?:  Yes (Written)  Location:     Location:  Right foot    Location detail:  Right big toe  Anesthesia:     Anesthesia:  Digital block    Local anesthetic:  Lidocaine 2% without epinephrine    Anesthetic total (ml):  4  Procedure Details:     Preparation:  Skin prepped with alcohol and skin prepped with Betadine    Amount removed:  Complete    Wedge excision of skin of nail fold: No      Nail bed sutured?: No      Nail matrix removed:  Partial (Nail matrix removed to site with phenol application x3, held for 30 seconds each, followed by cleansing with rubbing alcohol followed by sterile saline)    Removal method:  Phenol and alcohol    Dressing applied:  4x4, antibiotic ointment and Xeroform gauze    Patient tolerance:  Patient tolerated the procedure well with no immediate complications     Following written and verbal consent, a timeout was performed confirming the patient's identification, procedure, surgical site, medications and allergies. All were in agreement.         Nail Removal    Date/Time: 7/18/2024 8:30 AM    Performed by: Nina Johns DPM  Authorized by: Nina Johns DPM    Consent Done?:  Yes (Written)  Location:     Location:  Left foot    Location detail:  Left big toe  Anesthesia:     Anesthesia:  Digital block    Local anesthetic:  Lidocaine 2% without epinephrine    Anesthetic total (ml):  4  Procedure Details:     Preparation:  Skin prepped with alcohol and skin prepped with Betadine    Amount removed:  Complete    Wedge excision of skin of nail fold: No      Nail bed sutured?: No      Nail matrix removed:  Partial (Nail matrix removed to site with phenol application x3, held for 30 seconds each, followed by cleansing with rubbing alcohol followed by sterile saline)    Removal method:  Phenol and alcohol    Dressing applied:  4x4, antibiotic  ointment and Xeroform gauze    Patient tolerance:  Patient tolerated the procedure well with no immediate complications     Following written and verbal consent, a timeout was performed confirming the patient's identification, procedure, surgical site, medications and allergies. All were in agreement.

## 2024-07-18 NOTE — LETTER
July 18, 2024      [facility]  [address]  [place] [address]  Phone: [phone]  Fax: [phone]       Patient: Juan Manuel Hdz   YOB: 1980  Date of Visit: 07/18/2024    To Whom It May Concern:    Aminah Hdz  was at [facility] on 07/18/2024. The patient may return to work/school on 08/01/2024 with restrictions to work from home. If you have any questions or concerns, or if I can be of further assistance, please do not hesitate to contact me.    Sincerely,    Michael Moody

## 2024-07-18 NOTE — PROGRESS NOTES
Subjective:     Patient ID: Juan Manuel Hdz is a 43 y.o. male.    Chief Complaint: Ingrown Toenail (Both feet big toes)    Juan Manuel is a 43 y.o. male who presents to the clinic complaining of thick and discolored toenails on both feet, great toes. Juan Manuel is inquiring about treatment options. Relates has been very difficult to get rid of fungus and causes him mild discomfort. Would like to get them permanently removed.     7/18/24: Seen today for ingrown toenail procedure    Review of Systems   Constitutional: Negative for chills, decreased appetite, diaphoresis and fever.   HENT:  Negative for congestion and hearing loss.    Cardiovascular:  Negative for chest pain, claudication, leg swelling and syncope.   Respiratory:  Negative for cough and shortness of breath.    Skin:  Positive for dry skin and nail changes. Negative for color change, flushing, itching, poor wound healing and rash.   Musculoskeletal:  Positive for arthritis and back pain. Negative for joint swelling.   Gastrointestinal:  Negative for nausea and vomiting.   Neurological:  Negative for focal weakness, numbness, paresthesias and weakness.   Psychiatric/Behavioral:  Negative for altered mental status. The patient is not nervous/anxious.         Objective:     Physical Exam  Constitutional:       General: He is not in acute distress.     Appearance: Normal appearance. He is well-developed. He is not diaphoretic.   Cardiovascular:      Comments: Dorsalis pedis and posterior tibial pulses are within normal limits. Skin temperature is within normal limits. Toes are cool to touch and feet are warm proximally. Hair growth is within normal limits. Skin is normotrophic and without hyperpigmentation. No edema noted. No varicosities or spider veins noted, bilaterally.   Musculoskeletal:         General: No tenderness.      Comments: Adequate joint range of motion without pain, limitation, nor crepitation to foot and ankle joints. Muscle strength is  5/5 in all groups bilaterally.       Feet:      Right foot:      Skin integrity: Dry skin present. No ulcer, blister, erythema or warmth.      Left foot:      Skin integrity: Dry skin present. No ulcer, blister, erythema or warmth.   Skin:     General: Skin is warm and dry.      Capillary Refill: Capillary refill takes less than 2 seconds.      Comments: Skin is warm and dry, no acute signs of infection noted bilaterally      Bilateral hallux toenails are thickened by 2-4 mm's, dystrophic, and are darkened in coloration with subungual fungal debris, mild tenderness on palpation. Remainder of toenails are of normal morphology.     Otherwise, no open wounds, macerations or hyperkeratotic lesions, bilaterally            Neurological:      Mental Status: He is alert and oriented to person, place, and time.      Sensory: No sensory deficit.      Motor: No abnormal muscle tone.      Comments: Light touch within normal limits.    Psychiatric:         Mood and Affect: Mood normal.         Behavior: Behavior normal.         Thought Content: Thought content normal.           Assessment:      Encounter Diagnoses   Name Primary?    Disease of nail Yes    Ingrown nail        Plan:     Juan Manuel was seen today for ingrown toenail.    Diagnoses and all orders for this visit:    Disease of nail  -     Nail Removal  -     Nail Removal    Ingrown nail    Other orders  -     mupirocin (BACTROBAN) 2 % ointment; Apply topically 3 (three) times daily.  -     traMADoL (ULTRAM) 50 mg tablet; Take 1 tablet (50 mg total) by mouth every 6 (six) hours.        I counseled the patient on his conditions, their implications and medical management.  Total nail avulsion performed, see procedure note. Sites dressed with antibiotic ointment and gauze, he is to change same 2-3x per day until healed  Return to clinic in 2-4 weeks, sooner PRN

## 2024-07-19 ENCOUNTER — PATIENT MESSAGE (OUTPATIENT)
Dept: PODIATRY | Facility: CLINIC | Age: 44
End: 2024-07-19
Payer: COMMERCIAL

## 2024-07-19 ENCOUNTER — NURSE TRIAGE (OUTPATIENT)
Dept: ADMINISTRATIVE | Facility: CLINIC | Age: 44
End: 2024-07-19
Payer: COMMERCIAL

## 2024-07-19 DIAGNOSIS — L60.0 INGROWN NAIL: Primary | ICD-10-CM

## 2024-07-19 RX ORDER — DEXAMETHASONE 4 MG/1
4 TABLET ORAL DAILY
Qty: 7 TABLET | Refills: 0 | Status: SHIPPED | OUTPATIENT
Start: 2024-07-19

## 2024-07-19 RX ORDER — OXYCODONE HYDROCHLORIDE 5 MG/1
5 TABLET ORAL EVERY 6 HOURS PRN
Qty: 28 TABLET | Refills: 0 | Status: SHIPPED | OUTPATIENT
Start: 2024-07-19

## 2024-07-19 RX ORDER — DOXYCYCLINE 100 MG/1
100 CAPSULE ORAL 2 TIMES DAILY
Qty: 14 CAPSULE | Refills: 0 | Status: SHIPPED | OUTPATIENT
Start: 2024-07-19

## 2024-07-20 NOTE — TELEPHONE ENCOUNTER
Pt calls, pt had 2 big toenails removed on Thursday with podiatry. Pt reports increased pain (way more) and signs of infection to left big toe.     Dispo is to call PCP within 24 hours. Weekend hours. Escalate to call PCP now. Called and left VM x's 2 and secure chat with Dr. Johns, on-call podiatry. Second call reached MD who said she is no longer on call today. Referred to Dr. Maximus Duran as on-call provider. No phobe number listed in [facility] for Dr. Duran.  Recommended patient be seen in local UC or [facility] for eval. Assisted patient to upload pics to chart via [facility]. OD [facility] declined. Pt would like to be seen in person. Recommended UC as a good source of care.     Used provider finder to call Dr. Duran. Recommended that he will call in an oral antibiotic and stronger pain medication to [facility]. Notified patient of the above and that pharmacy is open 24 hours now. Pt v/u.     Reason for Disposition   Entire toe is red    Additional Information   Negative: Patient sounds very sick or weak to the triager   Negative: [1] Looks infected (spreading redness, red streak, pus) AND [2] fever   Negative: [1] Red streaking AND [2] longer than 1 inch (2.5 cm)   Negative: [1] Skin around the nail has become red AND [2] larger than 2 inches (5 cm)    Protocols used: Toenail - Ingrown-A-

## 2024-07-20 NOTE — TELEPHONE ENCOUNTER
Pt calling regarding previous triage. Reports he was planning on picking up his medication tomorrow. Reports toe has since began to look worse (he has uploaded a new photo). I have attempted to contact on call provider x1 in this regard, no answer. I have encouraged patient to begin medication tonight. Informed him I would update him if I received any further information from provider.    Reason for Disposition   Nursing judgment or information in reference    Protocols used: No Guideline [id number]

## 2024-08-13 ENCOUNTER — OFFICE VISIT (OUTPATIENT)
Dept: PODIATRY | Facility: CLINIC | Age: 44
End: 2024-08-13
Payer: COMMERCIAL

## 2024-08-13 VITALS
HEART RATE: 96 BPM | WEIGHT: 155.44 LBS | SYSTOLIC BLOOD PRESSURE: 117 MMHG | HEIGHT: 69 IN | BODY MASS INDEX: 23.02 KG/M2 | DIASTOLIC BLOOD PRESSURE: 77 MMHG

## 2024-08-13 DIAGNOSIS — M79.674 PAIN IN TOES OF BOTH FEET: Primary | ICD-10-CM

## 2024-08-13 DIAGNOSIS — M79.675 PAIN IN TOES OF BOTH FEET: Primary | ICD-10-CM

## 2024-08-13 PROCEDURE — 99213 OFFICE O/P EST LOW 20 MIN: CPT | Mod: S$GLB,,, | Performed by: STUDENT IN AN ORGANIZED HEALTH CARE EDUCATION/TRAINING PROGRAM

## 2024-08-13 PROCEDURE — 99999 PR PBB SHADOW E&M-EST. PATIENT-LVL III: CPT | Mod: PBBFAC,,, | Performed by: STUDENT IN AN ORGANIZED HEALTH CARE EDUCATION/TRAINING PROGRAM

## 2024-08-13 PROCEDURE — 3078F DIAST BP <80 MM HG: CPT | Mod: CPTII,S$GLB,, | Performed by: STUDENT IN AN ORGANIZED HEALTH CARE EDUCATION/TRAINING PROGRAM

## 2024-08-13 PROCEDURE — 3074F SYST BP LT 130 MM HG: CPT | Mod: CPTII,S$GLB,, | Performed by: STUDENT IN AN ORGANIZED HEALTH CARE EDUCATION/TRAINING PROGRAM

## 2024-08-13 PROCEDURE — 3008F BODY MASS INDEX DOCD: CPT | Mod: CPTII,S$GLB,, | Performed by: STUDENT IN AN ORGANIZED HEALTH CARE EDUCATION/TRAINING PROGRAM

## 2024-08-13 RX ORDER — MUPIROCIN 20 MG/G
OINTMENT TOPICAL 3 TIMES DAILY
Qty: 30 G | Refills: 2 | Status: SHIPPED | OUTPATIENT
Start: 2024-08-13

## 2024-08-13 NOTE — PROGRESS NOTES
Subjective:     Patient ID: Juan Manuel Hdz is a 43 y.o. male.    Chief Complaint: Ingrown Toenail and Follow-up    Juan Manuel is a 43 y.o. male who presents to the clinic complaining of thick and discolored toenails on both feet, great toes. Juan Manuel is inquiring about treatment options. Relates has been very difficult to get rid of fungus and causes him mild discomfort. Would like to get them permanently removed.     7/18/24: Seen today for ingrown toenail procedure    8/13/24: Seen today for follow up, sites are healing well. Denies signs of infection    Review of Systems   Constitutional: Negative for chills, decreased appetite, diaphoresis and fever.   HENT:  Negative for congestion and hearing loss.    Cardiovascular:  Negative for chest pain, claudication, leg swelling and syncope.   Respiratory:  Negative for cough and shortness of breath.    Skin:  Positive for dry skin and nail changes. Negative for color change, flushing, itching, poor wound healing and rash.   Musculoskeletal:  Positive for arthritis and back pain. Negative for joint swelling.   Gastrointestinal:  Negative for nausea and vomiting.   Neurological:  Negative for focal weakness, numbness, paresthesias and weakness.   Psychiatric/Behavioral:  Negative for altered mental status. The patient is not nervous/anxious.         Objective:     Physical Exam  Constitutional:       General: He is not in acute distress.     Appearance: Normal appearance. He is well-developed. He is not diaphoretic.   Cardiovascular:      Comments: Dorsalis pedis and posterior tibial pulses are within normal limits. Skin temperature is within normal limits. Toes are cool to touch and feet are warm proximally. Hair growth is within normal limits. Skin is normotrophic and without hyperpigmentation. No edema noted. No varicosities or spider veins noted, bilaterally.   Musculoskeletal:         General: No tenderness.      Comments: Adequate joint range of motion without  pain, limitation, nor crepitation to foot and ankle joints. Muscle strength is 5/5 in all groups bilaterally.       Feet:      Right foot:      Skin integrity: Dry skin present. No ulcer, blister, erythema or warmth.      Left foot:      Skin integrity: Dry skin present. No ulcer, blister, erythema or warmth.   Skin:     General: Skin is warm and dry.      Capillary Refill: Capillary refill takes less than 2 seconds.      Comments: Skin is warm and dry, no acute signs of infection noted bilaterally      S/p total nail avulsion to bilateral hallux, sites with overlying well adhered eschar/scab. Appears to be healing well. No signs of infection noted.     Otherwise, no open wounds, macerations or hyperkeratotic lesions, bilaterally            Neurological:      Mental Status: He is alert and oriented to person, place, and time.      Sensory: No sensory deficit.      Motor: No abnormal muscle tone.      Comments: Light touch within normal limits.    Psychiatric:         Mood and Affect: Mood normal.         Behavior: Behavior normal.         Thought Content: Thought content normal.           Assessment:      Encounter Diagnosis   Name Primary?    Pain in toes of both feet Yes         Plan:     Juan Manuel was seen today for ingrown toenail and follow-up.    Diagnoses and all orders for this visit:    Pain in toes of both feet    Other orders  -     mupirocin (BACTROBAN) 2 % ointment; Apply topically 3 (three) times daily.          I counseled the patient on his conditions, their implications and medical management.  No signs of infection noted.  Continue changing site with Bactroban and bandaid 2-3x per day until healed. Inform clinic if does not improve or worsens in appearance  Return to clinic PRN

## 2024-08-25 ENCOUNTER — OFFICE VISIT (OUTPATIENT)
Dept: URGENT CARE | Facility: CLINIC | Age: 44
End: 2024-08-25
Payer: COMMERCIAL

## 2024-08-25 VITALS
BODY MASS INDEX: 23.77 KG/M2 | DIASTOLIC BLOOD PRESSURE: 86 MMHG | WEIGHT: 160.5 LBS | OXYGEN SATURATION: 98 % | RESPIRATION RATE: 20 BRPM | HEIGHT: 69 IN | HEART RATE: 93 BPM | TEMPERATURE: 98 F | SYSTOLIC BLOOD PRESSURE: 128 MMHG

## 2024-08-25 DIAGNOSIS — L55.1 SUNBURN OF SECOND DEGREE: ICD-10-CM

## 2024-08-25 DIAGNOSIS — J06.9 VIRAL URI WITH COUGH: Primary | ICD-10-CM

## 2024-08-25 DIAGNOSIS — R05.1 ACUTE COUGH: ICD-10-CM

## 2024-08-25 LAB
CTP QC/QA: YES
SARS-COV-2 AG RESP QL IA.RAPID: NEGATIVE

## 2024-08-25 PROCEDURE — 87811 SARS-COV-2 COVID19 W/OPTIC: CPT | Mod: QW,S$GLB,, | Performed by: PHYSICIAN ASSISTANT

## 2024-08-25 PROCEDURE — 99213 OFFICE O/P EST LOW 20 MIN: CPT | Mod: S$GLB,,, | Performed by: PHYSICIAN ASSISTANT

## 2024-08-25 RX ORDER — BENZONATATE 200 MG/1
200 CAPSULE ORAL 3 TIMES DAILY PRN
Qty: 30 CAPSULE | Refills: 0 | Status: SHIPPED | OUTPATIENT
Start: 2024-08-25 | End: 2024-09-04

## 2024-08-25 RX ORDER — SILVER SULFADIAZINE 10 G/1000G
CREAM TOPICAL
Status: COMPLETED | OUTPATIENT
Start: 2024-08-25 | End: 2024-08-25

## 2024-08-25 RX ORDER — SILVER SULFADIAZINE 10 G/1000G
CREAM TOPICAL 2 TIMES DAILY
Qty: 85 G | Refills: 0 | Status: SHIPPED | OUTPATIENT
Start: 2024-08-25 | End: 2024-08-30

## 2024-08-25 RX ADMIN — SILVER SULFADIAZINE: 10 CREAM TOPICAL at 05:08

## 2024-08-25 NOTE — PATIENT INSTRUCTIONS
"                                                         URI   If your condition worsens or fails to improve we recommend that you receive another evaluation at the urgent care/ER immediately or contact your PCP to discuss your concerns. You must understand that you've received an urgent care treatment only and that you may be released before all your medical problems are known or treated. You the patient will arrange for follouwp care as instructed.     If we discussed that I think your illness is viral, it will not respond to antibiotics and will last 10-14 days.     Use Tessalon as needed for cough  Zyrtec D, Claritin D or Allegra D can also help with symptoms of congestion and drainage.   If you have hypertension avoid using the "D" which is the decongestant   If you just have drainage you can take plain zyrtec, claritin or allegra   Use Flonase for postnasal drip and nasal congestion  Rest and fluids are also important.     Salt water gargles, warm tea with honey and chloraseptic spray as needed for sore throat.   Tylenol or ibuprofen can also be used as directed for pain unless you have an allergy to them or medical condition such as stomach ulcers, kidney or liver disease or blood thinners etc for which you should not be taking these type of medications.     Wash your sunburns twice a day with warm soap and water no topical alcohol no apply hydrogen peroxide.  Use the Silvadene where the blisters located as we discussed.  Make sure to wipe away old cream before applying new cream.  Tetanus up-to-date follow up as needed    "

## 2024-08-25 NOTE — PROGRESS NOTES
"Subjective:      Patient ID: Juan Manuel Hdz is a 43 y.o. male.    Vitals:  height is 5' 9" (1.753 m) and weight is 72.8 kg (160 lb 7.9 oz). His oral temperature is 98.1 °F (36.7 °C). His blood pressure is 128/86 and his pulse is 93. His respiration is 20 and oxygen saturation is 98%.     Chief Complaint: Sunburn    Pt present with symptoms of- Severe Sunburn that has blistered on the back of his neck and upper back that he got on a cruise that he returned from on Saturday and while he was doing yard work today his sunburn areas feel worse.     Sunburn  This is a new problem. The current episode started in the past 7 days. The problem has been gradually worsening. Associated symptoms include chills, coughing, headaches and a rash. Pertinent negatives include no abdominal pain, chest pain, congestion, diaphoresis, fatigue, fever, nausea, neck pain, sore throat or vomiting. Nothing aggravates the symptoms. Treatments tried: OTC- Sunburn lotion. The treatment provided no relief.       Constitution: Positive for chills. Negative for sweating, fatigue and fever.   HENT:  Negative for ear pain, ear discharge, congestion, postnasal drip, sinus pain, sinus pressure, sore throat, trouble swallowing and voice change.    Neck: Negative for neck pain and neck stiffness.   Cardiovascular:  Negative for chest pain.   Eyes:  Negative for eye discharge and eye itching.   Respiratory:  Positive for cough and sputum production. Negative for chest tightness, bloody sputum and shortness of breath.    Gastrointestinal:  Negative for abdominal pain, nausea, vomiting, constipation and diarrhea.   Skin:  Positive for rash.   Neurological:  Positive for headaches. Negative for dizziness.      Past Medical History:   Diagnosis Date    Acute appendicitis 11/21/2017    ADHD (attention deficit hyperactivity disorder)     Allergy     seasonal    AR (allergic rhinitis)     History of psychiatric care     Psychiatric problem     Raynaud's " disease without gangrene 2/14/2019    Reactive airway disease     Therapy        Past Surgical History:   Procedure Laterality Date    achilles tendon rupture Right     2015    APPENDECTOMY      BACK SURGERY  2010    CLOSED REDUCTION ZYGOMATIC ARCH FRACTURE         Family History   Problem Relation Name Age of Onset    Lupus Mother      Autoimmune disease Mother      Heart disease Father      Hyperlipidemia Father      Autism Brother      Diabetes Maternal Grandmother      Diabetes Maternal Grandfather      ADD / ADHD Neg Hx      Alcohol abuse Neg Hx      Anxiety disorder Neg Hx      Bipolar disorder Neg Hx      Dementia Neg Hx      Depression Neg Hx      Drug abuse Neg Hx      OCD Neg Hx      Paranoid behavior Neg Hx      Physical abuse Neg Hx      Schizophrenia Neg Hx      Seizures Neg Hx      Self injury Neg Hx      Sexual abuse Neg Hx      Suicide Neg Hx      Melanoma Neg Hx         Social History     Socioeconomic History    Marital status:    Occupational History    Occupation:      Employer: [employer]   Tobacco Use    Smoking status: Some Days     Types: Vaping with nicotine     Passive exposure: Never    Smokeless tobacco: Never   Substance and Sexual Activity    Alcohol use: Yes     Alcohol/week: 0.8 standard drinks of alcohol     Types: 1 Standard drinks or equivalent per week     Comment: occasionally    Drug use: No   Social History Narrative    Works at [employer] no kids       Current Outpatient Medications   Medication Sig Dispense Refill    dextroamphetamine-amphetamine (ADDERALL) 30 mg Tab Take 30 mg by mouth (1 tab) in the morning and 15 mg by mouth (1/2 tab) in the afternoon 45 tablet 0    ketoconazole (NIZORAL) 2 % cream Apply topically once daily. Apply to affected toenails for 6-12 months. 60 g 6    mupirocin (BACTROBAN) 2 % ointment Apply topically 3 (three) times daily. 30 g 2    omeprazole (PRILOSEC) 40 MG capsule Take 1 capsule (40 mg total) by mouth every  morning. 90 capsule 2     Current Facility-Administered Medications   Medication Dose Route Frequency Provider Last Rate Last Admin    silver sulfADIAZINE 1% cream   Topical (Top) 1 time in Clinic/HOD Leidy Curtis PA-C           Review of patient's allergies indicates:  No Known Allergies    Objective:     Physical Exam   Constitutional: He is oriented to person, place, and time. He appears well-developed. He is cooperative.  Non-toxic appearance. He does not appear ill. No distress.   HENT:   Head: Normocephalic and atraumatic.   Ears:   Right Ear: Hearing, tympanic membrane, external ear and ear canal normal. no impacted cerumen  Left Ear: Hearing, tympanic membrane, external ear and ear canal normal. no impacted cerumen  Nose: Nose normal. No mucosal edema, rhinorrhea, nasal deformity or congestion. No epistaxis. Right sinus exhibits no maxillary sinus tenderness and no frontal sinus tenderness. Left sinus exhibits no maxillary sinus tenderness and no frontal sinus tenderness.   Mouth/Throat: Uvula is midline and mucous membranes are normal. Mucous membranes are moist. No trismus in the jaw. Normal dentition. No uvula swelling. Posterior oropharyngeal erythema present. No oropharyngeal exudate or posterior oropharyngeal edema.   Eyes: Conjunctivae and lids are normal. Right eye exhibits no discharge. Left eye exhibits no discharge. No scleral icterus.   Neck: Trachea normal and phonation normal. Neck supple. No edema present. No erythema present. No neck rigidity present.   Cardiovascular: Normal rate, regular rhythm, normal heart sounds and normal pulses.   No murmur heard.Exam reveals no gallop and no friction rub.   Pulmonary/Chest: Effort normal and breath sounds normal. No stridor. No respiratory distress. He has no decreased breath sounds. He has no wheezes. He has no rhonchi. He has no rales.   Abdominal: Normal appearance.   Musculoskeletal: Normal range of motion.         General: No deformity.  Normal range of motion.      Cervical back: He exhibits no tenderness.   Lymphadenopathy:     He has no cervical adenopathy.   Neurological: He is alert and oriented to person, place, and time. He exhibits normal muscle tone. Coordination normal.   Skin: Skin is warm, dry, intact, not diaphoretic and not pale.        Psychiatric: His speech is normal and behavior is normal. Judgment and thought content normal.   Nursing note and vitals reviewed.        Results for orders placed or performed in visit on 08/25/24   SARS Coronavirus 2 Antigen, POCT Manual Read   Result Value Ref Range    SARS Coronavirus 2 Antigen Negative Negative     Acceptable Yes          Assessment:     1. Viral URI with cough    2. Sunburn of second degree    3. Acute cough        Plan:       Viral URI with cough  -     SARS Coronavirus 2 Antigen, POCT Manual Read  -     benzonatate (TESSALON) 200 MG capsule; Take 1 capsule (200 mg total) by mouth 3 (three) times daily as needed for Cough.  Dispense: 30 capsule; Refill: 0    Sunburn of second degree  -     silver sulfADIAZINE 1% cream  -     silver sulfADIAZINE 1% (SILVADENE) 1 % cream; Apply topically 2 (two) times daily. Apply to neck for 5 days  Dispense: 85 g; Refill: 0    Acute cough        I have reviewed the patient chart and pertinent past imaging/labs.  Patient felt much better after the Silvadene was placed on the burn    Patient Instructions                                                            URI   If your condition worsens or fails to improve we recommend that you receive another evaluation at the urgent care/ER immediately or contact your PCP to discuss your concerns. You must understand that you've received an urgent care treatment only and that you may be released before all your medical problems are known or treated. You the patient will arrange for follouwp care as instructed.     If we discussed that I think your illness is viral, it will not respond to  "antibiotics and will last 10-14 days.     Use Tessalon as needed for cough  Zyrtec D, Claritin D or Allegra D can also help with symptoms of congestion and drainage.   If you have hypertension avoid using the "D" which is the decongestant   If you just have drainage you can take plain zyrtec, claritin or allegra   Use Flonase for postnasal drip and nasal congestion  Rest and fluids are also important.     Salt water gargles, warm tea with honey and chloraseptic spray as needed for sore throat.   Tylenol or ibuprofen can also be used as directed for pain unless you have an allergy to them or medical condition such as stomach ulcers, kidney or liver disease or blood thinners etc for which you should not be taking these type of medications.     Wash your sunburns twice a day with warm soap and water no topical alcohol no apply hydrogen peroxide.  Use the Silvadene where the blisters located as we discussed.  Make sure to wipe away old cream before applying new cream.  Tetanus up-to-date follow up as needed                  "

## 2024-09-02 DIAGNOSIS — F90.0 ATTENTION DEFICIT HYPERACTIVITY DISORDER (ADHD), PREDOMINANTLY INATTENTIVE TYPE: ICD-10-CM

## 2024-09-03 RX ORDER — DEXTROAMPHETAMINE SACCHARATE, AMPHETAMINE ASPARTATE, DEXTROAMPHETAMINE SULFATE AND AMPHETAMINE SULFATE 7.5; 7.5; 7.5; 7.5 MG/1; MG/1; MG/1; MG/1
TABLET ORAL
Qty: 45 TABLET | Refills: 0 | Status: SHIPPED | OUTPATIENT
Start: 2024-09-03

## 2024-10-08 ENCOUNTER — OFFICE VISIT (OUTPATIENT)
Dept: PSYCHIATRY | Facility: CLINIC | Age: 44
End: 2024-10-08
Payer: COMMERCIAL

## 2024-10-08 VITALS
WEIGHT: 161.38 LBS | DIASTOLIC BLOOD PRESSURE: 74 MMHG | BODY MASS INDEX: 23.83 KG/M2 | SYSTOLIC BLOOD PRESSURE: 124 MMHG | HEART RATE: 88 BPM

## 2024-10-08 DIAGNOSIS — F43.9 STRESS: ICD-10-CM

## 2024-10-08 DIAGNOSIS — F90.0 ATTENTION DEFICIT HYPERACTIVITY DISORDER (ADHD), PREDOMINANTLY INATTENTIVE TYPE: Primary | ICD-10-CM

## 2024-10-08 PROCEDURE — 99213 OFFICE O/P EST LOW 20 MIN: CPT | Mod: S$GLB,,,

## 2024-10-08 PROCEDURE — 3074F SYST BP LT 130 MM HG: CPT | Mod: CPTII,S$GLB,,

## 2024-10-08 PROCEDURE — 3078F DIAST BP <80 MM HG: CPT | Mod: CPTII,S$GLB,,

## 2024-10-08 PROCEDURE — 99999 PR PBB SHADOW E&M-EST. PATIENT-LVL II: CPT | Mod: PBBFAC,,,

## 2024-10-08 PROCEDURE — 1159F MED LIST DOCD IN RCRD: CPT | Mod: CPTII,S$GLB,,

## 2024-10-08 PROCEDURE — 3008F BODY MASS INDEX DOCD: CPT | Mod: CPTII,S$GLB,,

## 2024-10-08 PROCEDURE — 1160F RVW MEDS BY RX/DR IN RCRD: CPT | Mod: CPTII,S$GLB,,

## 2024-10-08 RX ORDER — DEXTROAMPHETAMINE SACCHARATE, AMPHETAMINE ASPARTATE, DEXTROAMPHETAMINE SULFATE AND AMPHETAMINE SULFATE 7.5; 7.5; 7.5; 7.5 MG/1; MG/1; MG/1; MG/1
TABLET ORAL
Qty: 45 TABLET | Refills: 0 | Status: SHIPPED | OUTPATIENT
Start: 2024-11-07

## 2024-10-08 RX ORDER — DEXTROAMPHETAMINE SACCHARATE, AMPHETAMINE ASPARTATE, DEXTROAMPHETAMINE SULFATE AND AMPHETAMINE SULFATE 7.5; 7.5; 7.5; 7.5 MG/1; MG/1; MG/1; MG/1
TABLET ORAL
Qty: 45 TABLET | Refills: 0 | Status: SHIPPED | OUTPATIENT
Start: 2024-10-08

## 2024-10-08 RX ORDER — DEXTROAMPHETAMINE SACCHARATE, AMPHETAMINE ASPARTATE, DEXTROAMPHETAMINE SULFATE AND AMPHETAMINE SULFATE 7.5; 7.5; 7.5; 7.5 MG/1; MG/1; MG/1; MG/1
TABLET ORAL
Qty: 45 TABLET | Refills: 0 | Status: SHIPPED | OUTPATIENT
Start: 2024-12-07

## 2024-10-08 NOTE — PROGRESS NOTES
[facility]   DEPARTMENT OF PSYCHIATRY     IDENTIFIERS & DEMOGRAPHICS:     SERVICE: General Adult  ENCOUNTER: subsequent    -- PATIENT IDENTIFIERS: Juan Manuel Hdz  [id number]  1980  43 y.o.  male  -- LOCATION OF PATIENT: clinic/office    -- MODE OF ARRIVAL: self-presented  -- PRESENT WITH PATIENT DURING SESSION: ALONE  -- SOURCES OF INFORMATION: PATIENT  -- ENCOUNTER PROVIDER: Phillip Tolliver MD        PRESENTATION:     CHIEF COMPLAINT(S): ADHD, anxiety    SUBJECTIVE/CURRENT FINDINGS:    - doing well  - planning to start in [facility] clinic w/ [facility] beginning this Fri  - wife went back to [facility], plans to get her mother and return to [facility] next Tues, her mother will live w/ them   - wife at [facility] [employer] for research/genetics  - content w/ current med regimen, no side effects  - taking adderall on wknds has helped w/ irritability  - getting better sleep; reports no trouble falling asleep but fights it and gets on phone    Per Chart Review:    Per last appt (7/3/24):    ADHD  R/o adjustment disorder, with anxiety      In Summary:  43M w/ h/o ADHD, well-controlled on current regimen, reporting recent irritability and interest in therapy.      Plan:  - continue Adderall 30 mg qAM + 15 mg in afternoon  denies side effects  refill ordered today   - encouraged to take Adderall consistently (including weekends) and see helpful for irritability  - discussed considering change to XR formulation of stimulant if irritability is a persistent issue  - LA  reviewed  - will refer to short-term psychotherapy program ([facility]) for mood, anxiety, stress, interpersonal issues  - will refer to psychoeducational courses (i.e. Stress Mgmt 101) when they resume in Sep   - discussed how psychotherapy works  - provided handout on Automatic Negative Thoughts (ANTs)  - discussed sleep hygiene, provided handout  - discussed medication for mood and anxiety  will try therapy and defer additional medication at this  time  - RTC in 1 mo, at pt's request      REVIEW OF SYSTEMS:    >> SOURCES: patient     N   Sleep Disturbance/Disruption  no insomnia     N   Appetite/Weight Change   N   Alterations in Energy Level   N   Impaired Focus/Concentration   N   Depressive Symptomatology   N   Excessive Anxiety/Worry  stress    MEDICAL  Pertinent Positives/Negatives:    No f/c.  No change in vision or hearing.  No HA or dizziness.  No CP, palpitations, or SOB.  No n/v/d/c.  No urinary changes.  No skin changes.  No aches/pains.      Regarding the current presentation, no other significant issues or complaints are voiced or known at this time.       ADD-ON PSYCHOTHERAPY:     ADD-ON THERAPY     HISTORY:     HISTORY    Allergies:  Patient has no known allergies.     EXAMINATION:     VITALS:  /74   Pulse 88   Wt 73.2 kg (161 lb 6 oz)   BMI 23.83 kg/m²     MENTAL STATUS EXAMINATION:  Appearance: appears stated age  appropriately dressed, adequately groomed, in no apparent distress, well-appearing    Behavior & Attitude: participative, under adequate behavioral control  calm, engaged, agreeable, cooperative    Movements & Motor Activity: no psychomotor agitation, no psychomotor retardation, normal gait, normal station, ambulates without assistance, no tics, no tremor, no parkinsonism    Speech & Language: normal rate, normal volume, spontaneous, reciprocal, fluent    Mood: good  Affect: reactive  appropriate given the situation/context    Thought Process & Associations: linear, goal-directed, logical, coherent, relevant, abstract    Thought Content & Perceptions: no delusions, no paranoid ideation, no hallucinations    Sensorium: awake, alert, clear    Orientation: grossly intact, oriented to person, oriented to place, oriented to time, oriented to situation    Recent & Remote Memory: intact (recent), intact (remote)    Attention & Concentration: intact  attentive to conversation, not easily  distracted    Fund of Knowledge: intact, vocabulary proficient    Insight: intact  demonstrates sufficient awareness of condition/situation    Judgment: intact  heeds instructions/advice             RISK & REGULATORY:      RISK PARAMETERS (current to the encounter/episode  NOT inclusive of past history):     N   Suicidal Ideation/Threats   N   Suicide Attempts/Gestures   N   Homicidal Ideation/Threats   N   Homicidal Behavior   N   Non-Suicidal Self-Injurious Behavior   N   Perpetrated Violence     REGULATORY:    -- : REVIEWED      INFORMED CONSENT & SHARED DECISION MAKING are the hallmark and bedrock of good clinical care, and as such have been employed and obtained, respectively, to the degree possible.  Discussed, to the extent possible, diagnosis, risks and benefits of proposed treatment (e.g., medication, therapy) vs alternative treatments vs no treatment, potential side effects of these treatments, and the inherent unpredictability of treatment.        WARNINGS & PRECAUTIONS:  >> In cases of emergencies (e.g. SI/HI resulting in danger to self or others, functioning deteriorating to the level of grave disability), call 911 or [phone], or present to the emergency department for immediate assistance.    >> Individuals should not operate a motor vehicle or heavy machinery if effects of medications or underlying symptoms/condition impair the ability to do so safely.    >> FULLY comply with ANY/ALL medication as prescribed/instructed and report ANY/ALL suspected adverse effects to appropriate health care providers.       ASSESSMENT & PLAN:     DIAGNOSES & PROBLEMS:       1.  ADHD, inattentive type, by hx  chronic stable    PSYCHOTROPIC REGIMEN:   (C)=Continue as prescribed  (A)=Adjust as noted  (I)=Iniitate  (D)=Discontinue      1.  Adderall ir 30 mg every morning (C)  RESPONSE: robust  ADHERENCE: adherent  SIDE EFFECTS: negligible    2.  Adderall ir 15 mg in afternoon (C)   RESPONSE: robust  ADHERENCE: adherent  SIDE EFFECTS: negligible    Continue current regimen    -- PLAN (goals  recommentations):     Weekly therapy in [facility] clinic w/ [facility]    Sleep hygiene (no phone/screens)    RTC in 3 mo    CHART REVIEW: available documentation has been reviewed, and pertinent elements of the chart have been incorporated into this evaluation where appropriate.       DIAGNOSTIC TESTING:      Glu *   *  Li *   *  TSH *   *    HgA1c *   *  VPA *   *   FT4 *   *    Na *   *  CLZ *   *  WBC 5.70  11/5/2021    Cr *   *  ANC 3.8; 66.0;   11/5/2021   Hgb 15.2  11/5/2021     BUN *   *  Trop I *   *  HCT 44.3  11/5/2021     GFR *   *   CPK *   *    11/5/2021     Alb 4.2  5/6/2022   PRL *   *  B12 *   *     T Bili 1.3 (H)  5/6/2022  Chol *   *  B9 *   *    ALP 34 (L)  5/6/2022  TGs *   *  B1 *   *    AST 25  5/6/2022  HDL *   *  Vit D *   *     ALT 19  5/6/2022  LDL *   *  HIV *   *     INR *   *  Ember *   *   Hep C *   *    GGT *   *  Lip *   *  RPR *   *    MCV 87  11/5/2021   NH4 *   *  UPT *   *      PETH *   *  THC *   *    ETOH *   *  EMMA *   *    EtG *   *  AMP *   *    ALC *   *  OPI *   *    BZO *   *  MTD *   *     BAR *   *  BUP *   *    PCP *   *  FEN *   *          KEY & LINKS:        Y  = yes/endorses     N  = no/denies     U  = unknown/unable to assess    ADHD   AIMS   AUDIT   AUDIT-C   C-SSRS (Screen)   C-SSRS (Short)   C-SSRS (Full)   DAST   DAST-10   PILAR-7   MoCA   PCL-5   PHQ-9   FERMIN   YMRS     Consults

## 2024-12-10 ENCOUNTER — PATIENT MESSAGE (OUTPATIENT)
Dept: PSYCHIATRY | Facility: CLINIC | Age: 44
End: 2024-12-10
Payer: COMMERCIAL

## 2025-01-16 ENCOUNTER — LAB VISIT (OUTPATIENT)
Dept: LAB | Facility: HOSPITAL | Age: 45
End: 2025-01-16
Attending: FAMILY MEDICINE
Payer: COMMERCIAL

## 2025-01-16 ENCOUNTER — NURSE TRIAGE (OUTPATIENT)
Dept: ADMINISTRATIVE | Facility: CLINIC | Age: 45
End: 2025-01-16
Payer: COMMERCIAL

## 2025-01-16 ENCOUNTER — OFFICE VISIT (OUTPATIENT)
Dept: URGENT CARE | Facility: CLINIC | Age: 45
End: 2025-01-16
Payer: COMMERCIAL

## 2025-01-16 VITALS
RESPIRATION RATE: 20 BRPM | OXYGEN SATURATION: 99 % | TEMPERATURE: 99 F | WEIGHT: 161 LBS | HEIGHT: 69 IN | SYSTOLIC BLOOD PRESSURE: 114 MMHG | DIASTOLIC BLOOD PRESSURE: 79 MMHG | HEART RATE: 93 BPM | BODY MASS INDEX: 23.85 KG/M2

## 2025-01-16 DIAGNOSIS — F90.0 ATTENTION DEFICIT HYPERACTIVITY DISORDER (ADHD), PREDOMINANTLY INATTENTIVE TYPE: ICD-10-CM

## 2025-01-16 DIAGNOSIS — R19.7 DIARRHEA, UNSPECIFIED TYPE: ICD-10-CM

## 2025-01-16 DIAGNOSIS — A08.4 VIRAL GASTROENTERITIS: Primary | ICD-10-CM

## 2025-01-16 LAB
C DIFF GDH STL QL: NEGATIVE
C DIFF TOX A+B STL QL IA: NEGATIVE

## 2025-01-16 PROCEDURE — 87209 SMEAR COMPLEX STAIN: CPT | Performed by: FAMILY MEDICINE

## 2025-01-16 PROCEDURE — 87449 NOS EACH ORGANISM AG IA: CPT | Mod: 91 | Performed by: FAMILY MEDICINE

## 2025-01-16 PROCEDURE — 99213 OFFICE O/P EST LOW 20 MIN: CPT | Mod: S$GLB,,, | Performed by: FAMILY MEDICINE

## 2025-01-16 PROCEDURE — 87324 CLOSTRIDIUM AG IA: CPT | Performed by: FAMILY MEDICINE

## 2025-01-16 PROCEDURE — 87046 STOOL CULTR AEROBIC BACT EA: CPT | Performed by: FAMILY MEDICINE

## 2025-01-16 PROCEDURE — 87045 FECES CULTURE AEROBIC BACT: CPT | Performed by: FAMILY MEDICINE

## 2025-01-16 PROCEDURE — 87328 CRYPTOSPORIDIUM AG IA: CPT | Performed by: FAMILY MEDICINE

## 2025-01-16 PROCEDURE — 87427 SHIGA-LIKE TOXIN AG IA: CPT | Mod: 59 | Performed by: FAMILY MEDICINE

## 2025-01-16 RX ORDER — DEXTROAMPHETAMINE SACCHARATE, AMPHETAMINE ASPARTATE, DEXTROAMPHETAMINE SULFATE AND AMPHETAMINE SULFATE 7.5; 7.5; 7.5; 7.5 MG/1; MG/1; MG/1; MG/1
TABLET ORAL
Qty: 45 TABLET | Refills: 0 | Status: CANCELLED | OUTPATIENT
Start: 2025-01-16

## 2025-01-16 RX ORDER — ONDANSETRON HYDROCHLORIDE 8 MG/1
8 TABLET, FILM COATED ORAL EVERY 8 HOURS PRN
Qty: 12 TABLET | Refills: 0 | Status: SHIPPED | OUTPATIENT
Start: 2025-01-16

## 2025-01-16 NOTE — PATIENT INSTRUCTIONS
GO BACK TO FLUIDS ONLY (SUCH AS REHYDRATION FLUIDS AND/OR DILUTED GATORADE).  WHEN HUNGRY AND SYMPTOMS ARE IMPROVING, ADVANCE TO BLAND DIET AS TOLERATED.    SHOULD DIARRHEA CONTINUE, STOOL SAMPLES CAN BE BROUGHT TO THE LAB AT \[facility]\"".    Make sure that you follow up with your primary care doctor in the next 2-5 days if needed .  Go to or return to Urgent Care if signs or symptoms change and certainly if you have worsening and/or severe symptoms go to the nearest emergency department for further evaluation.

## 2025-01-16 NOTE — PROGRESS NOTES
"Subjective:      Patient ID: Juan Manuel Hdz is a 44 y.o. male.    Vitals:  height is 5' 9" (1.753 m) and weight is 73 kg (161 lb). His oral temperature is 98.7 °F (37.1 °C). His blood pressure is 114/79 and his pulse is 93. His respiration is 20 and oxygen saturation is 99%.     Chief Complaint: Emesis (/)    44-year-old male with symptoms for one-week.  It started as low-grade temp and nausea and vomiting and chills first 2 days, following exposure from son who had similar symptoms (since resolved).  It improved after several days, and it sounds like he immediately started eating heavily such as fried crawfish poboy, etc.  Symptoms immediately recurred (although no fever ) with main complaint of vomiting and watery diarrhea.  No associated blood.  No recent foreign travel.  No recent antibiotics.  He estimates last 24 hours with 4 episodes of vomiting and 10 episodes diarrhea.  Complains of stomach cramping        Emesis   This is a new problem. The current episode started in the past 7 days. The problem occurs more than 10 times per day. The problem has been waxing and waning. The emesis has an appearance of projectile. Associated symptoms include chills, diarrhea and headaches. Risk factors include ill contacts. Treatments tried: "stomach chew" The treatment provided no relief.     Constitution: Positive for chills.   Gastrointestinal:  Positive for vomiting and diarrhea. Negative for bright red blood in stool and dark colored stools.   Neurological:  Positive for headaches.      Objective:     Physical Exam   Constitutional: He is oriented to person, place, and time. He appears well-developed.  Non-toxic appearance. He does not appear ill. No distress.   HENT:   Head: Normocephalic and atraumatic.   Ears:   Right Ear: Tympanic membrane and external ear normal.   Left Ear: Tympanic membrane and external ear normal.   Nose: Nose normal.   Mouth/Throat: Mucous membranes are normal. No oropharyngeal exudate " or posterior oropharyngeal erythema.   Eyes: Conjunctivae and lids are normal.   Neck: Trachea normal. Neck supple.   Cardiovascular: Normal rate, regular rhythm and normal heart sounds.   Pulmonary/Chest: Effort normal and breath sounds normal. No stridor. No respiratory distress. He has no wheezes. He has no rales.   Abdominal: Normal appearance. He exhibits no distension and no mass. Soft. There is no abdominal tenderness. There is no rebound and no guarding.      Comments: Bowel sounds are slightly hyperactive.  Abdomen is soft and nontender.   Musculoskeletal: Normal range of motion.         General: Normal range of motion.   Lymphadenopathy:     He has no cervical adenopathy.   Neurological: He is alert and oriented to person, place, and time. He has normal strength.   Skin: Skin is warm, dry, intact, not diaphoretic and not pale.   Psychiatric: His speech is normal and behavior is normal. Judgment and thought content normal.   Nursing note and vitals reviewed.      Assessment:     1. Viral gastroenteritis    2. Diarrhea, unspecified type        Plan:       Viral gastroenteritis  -     ondansetron (ZOFRAN) 8 MG tablet; Take 1 tablet (8 mg total) by mouth every 8 (eight) hours as needed for Nausea.  Dispense: 12 tablet; Refill: 0    Diarrhea, unspecified type  -     Giardia / Cryptosporidum, EIA; Future; Expected date: 01/16/2025  -     Stool Exam-Ova,Cysts,Parasites; Future; Expected date: 01/16/2025  -     Clostridium difficile EIA; Future; Expected date: 01/16/2025  -     Stool culture; Future; Expected date: 01/16/2025    GO BACK TO FLUIDS ONLY (SUCH AS REHYDRATION FLUIDS AND/OR DILUTED GATORADE).  WHEN HUNGRY AND SYMPTOMS ARE IMPROVING, ADVANCE TO BLAND DIET AS TOLERATED.    SHOULD DIARRHEA CONTINUE, STOOL SAMPLES CAN BE BROUGHT TO THE LAB AT [facility].    Make sure that you follow up with your primary care doctor in the next 2-5 days if needed .  Go to or return to Urgent Care if signs or symptoms  change and certainly if you have worsening and/or severe symptoms go to the nearest emergency department for further evaluation.

## 2025-01-17 DIAGNOSIS — F90.0 ATTENTION DEFICIT HYPERACTIVITY DISORDER (ADHD), PREDOMINANTLY INATTENTIVE TYPE: ICD-10-CM

## 2025-01-17 LAB
CRYPTOSP AG STL QL IA: NEGATIVE
G LAMBLIA AG STL QL IA: NEGATIVE

## 2025-01-18 ENCOUNTER — PATIENT MESSAGE (OUTPATIENT)
Dept: URGENT CARE | Facility: CLINIC | Age: 45
End: 2025-01-18
Payer: COMMERCIAL

## 2025-01-18 LAB
E COLI SXT1 STL QL IA: NEGATIVE
E COLI SXT2 STL QL IA: NEGATIVE

## 2025-01-19 LAB — BACTERIA STL CULT: NORMAL

## 2025-01-21 ENCOUNTER — PATIENT MESSAGE (OUTPATIENT)
Dept: PSYCHIATRY | Facility: CLINIC | Age: 45
End: 2025-01-21
Payer: COMMERCIAL

## 2025-01-21 ENCOUNTER — TELEPHONE (OUTPATIENT)
Dept: PSYCHIATRY | Facility: CLINIC | Age: 45
End: 2025-01-21
Payer: COMMERCIAL

## 2025-01-21 RX ORDER — DEXTROAMPHETAMINE SACCHARATE, AMPHETAMINE ASPARTATE, DEXTROAMPHETAMINE SULFATE AND AMPHETAMINE SULFATE 7.5; 7.5; 7.5; 7.5 MG/1; MG/1; MG/1; MG/1
TABLET ORAL
Qty: 45 TABLET | Refills: 0 | OUTPATIENT
Start: 2025-01-21

## 2025-01-22 ENCOUNTER — OFFICE VISIT (OUTPATIENT)
Dept: PSYCHIATRY | Facility: CLINIC | Age: 45
End: 2025-01-22
Payer: COMMERCIAL

## 2025-01-22 DIAGNOSIS — F90.0 ATTENTION DEFICIT HYPERACTIVITY DISORDER (ADHD), PREDOMINANTLY INATTENTIVE TYPE: ICD-10-CM

## 2025-01-22 DIAGNOSIS — F90.0 ATTENTION DEFICIT HYPERACTIVITY DISORDER (ADHD), PREDOMINANTLY INATTENTIVE TYPE: Primary | ICD-10-CM

## 2025-01-22 PROCEDURE — 98004 SYNCH AUDIO-VIDEO EST SF 10: CPT | Mod: 95,,,

## 2025-01-22 RX ORDER — DEXTROAMPHETAMINE SACCHARATE, AMPHETAMINE ASPARTATE, DEXTROAMPHETAMINE SULFATE AND AMPHETAMINE SULFATE 7.5; 7.5; 7.5; 7.5 MG/1; MG/1; MG/1; MG/1
TABLET ORAL
Qty: 45 TABLET | Refills: 0 | Status: SHIPPED | OUTPATIENT
Start: 2025-01-22

## 2025-01-22 RX ORDER — DEXTROAMPHETAMINE SACCHARATE, AMPHETAMINE ASPARTATE, DEXTROAMPHETAMINE SULFATE AND AMPHETAMINE SULFATE 7.5; 7.5; 7.5; 7.5 MG/1; MG/1; MG/1; MG/1
TABLET ORAL
Qty: 45 TABLET | Refills: 0 | Status: SHIPPED | OUTPATIENT
Start: 2025-03-24

## 2025-01-22 RX ORDER — DEXTROAMPHETAMINE SACCHARATE, AMPHETAMINE ASPARTATE, DEXTROAMPHETAMINE SULFATE AND AMPHETAMINE SULFATE 7.5; 7.5; 7.5; 7.5 MG/1; MG/1; MG/1; MG/1
TABLET ORAL
Qty: 45 TABLET | Refills: 0 | Status: SHIPPED | OUTPATIENT
Start: 2025-02-21

## 2025-01-22 NOTE — PROGRESS NOTES
"  [facility]   DEPARTMENT OF PSYCHIATRY     IDENTIFIERS & DEMOGRAPHICS:     SERVICE: General Adult  ENCOUNTER: subsequent    TELEPSYCHIATRY (AUDIO ONLY): Each patient who is provided psychiatric services via telehealth is: (1) informed of the relationship between the psychiatric provider and patient, as well as the respective role of any other health care staff/providers with respect to management of the patient; and (2) notified that he or she may decline to receive psychiatric services by telehealth and may withdraw from such care at any time.  Risks of telehealth include the potential for security breaches (HIPPA compliant platforms notwithstanding) and technological failure, as well as the limitations to physical examination inherent to the modality. The patient was agreeable to the use of telehealth services.    NOTE: This was an audio only telepsychiatry encounter.  The reason for the audio only service, rather than synchronous audio and video, was related to technical difficulties or patient preference/necessity.  This service was not originating from, nor will  to, a related E/M service.  Prevailing standard of care was able to be met in this audio-only session. Patient verbally consented to receive this service via voice-only means.    -- PATIENT IDENTIFIERS: Juan Manuel Hdz  [id number]  1980  44 y.o.  male  -- LOCATION OF PATIENT: community    -- PRESENT WITH PATIENT DURING SESSION: ALONE  -- SOURCES OF INFORMATION: PATIENT  -- ENCOUNTER PROVIDER: Phillip Tolliver MD        PRESENTATION:     CHIEF COMPLAINT(S): ADHD, anxiety    OVERVIEW OF THE HPI:    44M w/ h/o ADHD who presents for psych f/u    SUBJECTIVE/CURRENT FINDINGS:    - had good holidays  - mother-in-law living w/ them  - has been out of meds  - meds have been helpful for productivity w/ work, also "better control of myself" (e.g. not as reactive w/ arguments)  - taking some wknds, not all    REVIEW OF SYSTEMS:    >> " SOURCES: patient     N   Sleep Disturbance/Disruption  no insomnia     N   Appetite/Weight Change   N   Alterations in Energy Level   Y   Impaired Focus/Concentration  +diminished ability to think    off adderall   N   Depressive Symptomatology   N   Excessive Anxiety/Worry  stress   Y   Dysregulated Mood/Behavior  +irritability     N   Cardiopulmonary Symptoms    MEDICAL  Pertinent Positives/Negatives:    No f/c.  No change in hearing.  No CP, palpitations, or SOB.  No new aches/pains.        Regarding the current presentation, no other significant issues or complaints are voiced or known at this time.       ADD-ON PSYCHOTHERAPY:     ADD-ON THERAPY     HISTORY:     HISTORY    Allergies:  Patient has no known allergies.     EXAMINATION:     VITALS:  There were no vitals taken for this visit.    MENTAL STATUS EXAMINATION:  Appearance:   unknown/unable to assess  Behavior & Attitude: participative, under adequate behavioral control  calm, engaged, agreeable, cooperative    Movements & Motor Activity:   unknown/unable to assess  Speech & Language: normal rate, normal volume, spontaneous, reciprocal, fluent    Mood: good  Affect:   unknown/unable to assess  Thought Process & Associations: linear, goal-directed, logical, coherent, relevant, abstract    Thought Content & Perceptions: no delusions, no paranoid ideation, no hallucinations    Sensorium: awake, alert, clear    Orientation: grossly intact, oriented to person, oriented to place, oriented to time, oriented to situation    Recent & Remote Memory: intact (recent), intact (remote)    Attention & Concentration: intact  attentive to conversation, not easily distracted    Fund of Knowledge: intact, vocabulary proficient    Insight: intact  demonstrates sufficient awareness of condition/situation    Judgment: intact  heeds instructions/advice             RISK & REGULATORY:      RISK PARAMETERS (current to the  encounter/episode  NOT inclusive of past history):     N   Suicidal Ideation/Threats   N   Suicide Attempts/Gestures   N   Homicidal Ideation/Threats   N   Homicidal Behavior   N   Non-Suicidal Self-Injurious Behavior   N   Perpetrated Violence     REGULATORY:    -- : REVIEWED      INFORMED CONSENT & SHARED DECISION MAKING are the hallmark and bedrock of good clinical care, and as such have been employed and obtained, respectively, to the degree possible.  Discussed, to the extent possible, diagnosis, risks and benefits of proposed treatment (e.g., medication, therapy) vs alternative treatments vs no treatment, potential side effects of these treatments, and the inherent unpredictability of treatment.        WARNINGS & PRECAUTIONS:  >> In cases of emergencies (e.g. SI/HI resulting in danger to self or others, functioning deteriorating to the level of grave disability), call 911 or [phone], or present to the emergency department for immediate assistance.    >> Individuals should not operate a motor vehicle or heavy machinery if effects of medications or underlying symptoms/condition impair the ability to do so safely.    >> FULLY comply with ANY/ALL medication as prescribed/instructed and report ANY/ALL suspected adverse effects to appropriate health care providers.       ASSESSMENT & PLAN:     DIAGNOSES & PROBLEMS:       1.  ADHD, inattentive type, by hx  chronic stable    PSYCHOTROPIC REGIMEN:   (C)=Continue as prescribed  (A)=Adjust as noted  (I)=Iniitate  (D)=Discontinue      1.  Adderall ir 30 mg every morning (C)  RESPONSE: robust  ADHERENCE: adherent  SIDE EFFECTS: negligible    2.  Adderall ir 15 mg in afternoon (C)  RESPONSE: robust  ADHERENCE: adherent  SIDE EFFECTS: negligible    Continue current regimen    -- PLAN (goals  recommentations):     Weekly therapy in [facility] w/ [facility]    Sleep hygiene (no phone/screens)    RTC in 3 mo    CHART REVIEW: available  documentation has been reviewed, and pertinent elements of the chart have been incorporated into this evaluation where appropriate.       DIAGNOSTIC TESTING:      Glu *   *  Li *   *  TSH *   *    HgA1c *   *  VPA *   *   FT4 *   *    Na *   *  CLZ *   *  WBC *   *    Cr *   *  ANC *   *   Hgb *   *     BUN *   *  Trop I *   *  HCT *   *     GFR *   *   CPK *   *  PLT *   *     Alb 4.2  5/6/2022   PRL *   *  B12 *   *     T Bili 1.3 (H)  5/6/2022  Chol *   *  B9 *   *    ALP 34 (L)  5/6/2022  TGs *   *  B1 *   *    AST 25  5/6/2022  HDL *   *  Vit D *   *     ALT 19  5/6/2022  LDL *   *  HIV *   *     INR *   *  Ember *   *   Hep C *   *    GGT *   *  Lip *   *  RPR *   *    MCV *   *   NH4 *   *  UPT *   *      PETH *   *  THC *   *    ETOH *   *  EMMA *   *    EtG *   *  AMP *   *    ALC *   *  OPI *   *    BZO *   *  MTD *   *     BAR *   *  BUP *   *    PCP *   *  FEN *   *          KEY & LINKS:        Y  = yes/endorses     N  = no/denies     U  = unknown/unable to assess    ADHD   AIMS   AUDIT   AUDIT-C   C-SSRS (Screen)   C-SSRS (Short)   C-SSRS (Full)   DAST   DAST-10   PILAR-7   MoCA   PCL-5   PHQ-9   FERMIN   YMRS     Consults

## 2025-01-23 LAB — O+P STL MICRO: NORMAL

## 2025-01-29 ENCOUNTER — CLINICAL SUPPORT (OUTPATIENT)
Dept: PSYCHIATRY | Facility: CLINIC | Age: 45
End: 2025-01-29
Payer: COMMERCIAL

## 2025-01-29 DIAGNOSIS — F41.9 ANXIETY: Primary | ICD-10-CM

## 2025-01-29 DIAGNOSIS — F43.25 ADJUSTMENT DISORDER WITH MIXED DISTURBANCE OF EMOTIONS AND CONDUCT: ICD-10-CM

## 2025-01-29 PROCEDURE — 90837 PSYTX W PT 60 MINUTES: CPT | Mod: S$GLB,,, | Performed by: CASE MANAGER/CARE COORDINATOR

## 2025-01-29 NOTE — PROGRESS NOTES
[facility]   DEPARTMENT OF PSYCHIATRY     STAFF NOTE:     I have familiarized myself with the relevant aspects of the history and case, via documentation review and/or direct discussion, and coordinated care amongst my treatment team.  I agree with the overall findings noted herein, with corrections or clarifications, if any, detailed below.    **    David Kelly MD  Board Certification: Psychiatry and Addiction Medicine

## 2025-01-30 NOTE — PROGRESS NOTES
[facility]  Individual Psychotherapy (PhD/LCSW)    1/29/2025    Site:  [facility]         Therapeutic Intervention: Met with patient.  Outpatient - Behavior modifying psychotherapy 60 min - CPT code 38681 and Outpatient - Interactive psychotherapy 60 min - CPT code 11604    Chief complaint/reason for encounter: depression, anxiety, and interpersonal     Interval history and content of current session: Pt presented with euthymic mod, alert, oriented, and well groomed. Pt is ready for discharge from [facility], and reports he's been doing much better in regards to his marriage, and work. Pt denies and SI/HI or self-harming behaviors.    [facility],  (Final Session, Treatment Graduation)  Session Focus:  Brief check-in  Set agenda  Collect rating scales   Review action plan  Review treatment plan and treatment progress (show graph of rating scale scores)  Review Brief CBT  Relapse Prevention (summarize treatment and interventions)  Plan for the future  Graduate treatment  Post-STeP Graduation:  Review therapy materials as needed  Continue intervention techniques as needed    Treatment plan:  Target symptoms: anxiety , adjustment, work stress  Why chosen therapy is appropriate versus another modality: relevant to diagnosis, patient responds to this modality, evidence based practice  Outcome monitoring methods: self-report, observation, checklist/rating scale  Therapeutic intervention type: insight oriented psychotherapy, behavior modifying psychotherapy, supportive psychotherapy, interactive psychotherapy    Risk parameters:  Patient reports no suicidal ideation  Patient reports no homicidal ideation  Patient reports no self-injurious behavior  Patient reports no violent behavior    Verbal deficits: None    Patient's response to intervention:  The patient's response to intervention is accepting.    Progress toward goals and other mental status changes:  The patient's progress toward goals is  good.    Diagnosis:     ICD-10-CM ICD-9-CM   1. Anxiety  F41.9 300.00   2. Adjustment disorder with mixed disturbance of emotions and conduct  F43.25 309.4       Plan:  individual psychotherapy and medication management by physician    Return to clinic:  Treatment Complete    Length of Service (minutes):  57

## 2025-04-15 ENCOUNTER — TELEPHONE (OUTPATIENT)
Dept: PSYCHIATRY | Facility: CLINIC | Age: 45
End: 2025-04-15
Payer: COMMERCIAL

## 2025-04-15 NOTE — TELEPHONE ENCOUNTER
----- Message from Xochitl sent at 4/14/2025 12:05 PM CDT -----  Regarding: Refill  Patient request a refill for dextroamphetamine-amphetamine (ADDERALL) 30 mg Tab from [facility], Phone: [phone]: [phone].Last seen on 1/22/25 with no future scheduled appointments.He can be reached at [phone].Thank you.

## 2025-04-21 DIAGNOSIS — K21.9 LARYNGOPHARYNGEAL REFLUX (LPR): ICD-10-CM

## 2025-04-21 RX ORDER — OMEPRAZOLE 40 MG/1
40 CAPSULE, DELAYED RELEASE ORAL EVERY MORNING
Qty: 90 CAPSULE | Refills: 2 | Status: SHIPPED | OUTPATIENT
Start: 2025-04-21 | End: 2026-04-21

## 2025-05-28 ENCOUNTER — OFFICE VISIT (OUTPATIENT)
Dept: PSYCHIATRY | Facility: CLINIC | Age: 45
End: 2025-05-28
Payer: COMMERCIAL

## 2025-05-28 DIAGNOSIS — F41.9 ANXIETY: ICD-10-CM

## 2025-05-28 DIAGNOSIS — F90.0 ATTENTION DEFICIT HYPERACTIVITY DISORDER (ADHD), PREDOMINANTLY INATTENTIVE TYPE: Primary | ICD-10-CM

## 2025-05-28 RX ORDER — DEXTROAMPHETAMINE SACCHARATE, AMPHETAMINE ASPARTATE, DEXTROAMPHETAMINE SULFATE AND AMPHETAMINE SULFATE 7.5; 7.5; 7.5; 7.5 MG/1; MG/1; MG/1; MG/1
TABLET ORAL
Qty: 45 TABLET | Refills: 0 | Status: SHIPPED | OUTPATIENT
Start: 2025-06-27

## 2025-05-28 RX ORDER — DEXTROAMPHETAMINE SACCHARATE, AMPHETAMINE ASPARTATE, DEXTROAMPHETAMINE SULFATE AND AMPHETAMINE SULFATE 7.5; 7.5; 7.5; 7.5 MG/1; MG/1; MG/1; MG/1
TABLET ORAL
Qty: 45 TABLET | Refills: 0 | Status: SHIPPED | OUTPATIENT
Start: 2025-07-27

## 2025-05-28 RX ORDER — DEXTROAMPHETAMINE SACCHARATE, AMPHETAMINE ASPARTATE, DEXTROAMPHETAMINE SULFATE AND AMPHETAMINE SULFATE 7.5; 7.5; 7.5; 7.5 MG/1; MG/1; MG/1; MG/1
TABLET ORAL
Qty: 45 TABLET | Refills: 0 | Status: SHIPPED | OUTPATIENT
Start: 2025-05-28

## 2025-05-28 NOTE — PROGRESS NOTES
[facility]   DEPARTMENT OF PSYCHIATRY     IDENTIFIERS & DEMOGRAPHICS:     SERVICE: General Adult  ENCOUNTER: subsequent    TELEPSYCHIATRY (AUDIOVISUAL): Each patient who is provided psychiatric services via telehealth is: (1) informed of the relationship between the psychiatric provider and patient, as well as the respective role of any other health care staff/providers with respect to management of the patient; and (2) notified that he or she may decline to receive psychiatric services by telehealth and may withdraw from such care at any time.  Risks of telehealth include the potential for security breaches (HIPPA compliant platforms notwithstanding) and technological failure, as well as the limitations to physical examination inherent to the modality. The patient was agreeable to the use of telehealth services.    -- PATIENT IDENTIFIERS: Juan Manuel Hdz  [id number]  1980  44 y.o.  male  -- LOCATION OF PATIENT: community    -- PRESENT WITH PATIENT DURING SESSION: ALONE  -- SOURCES OF INFORMATION: PATIENT  -- ENCOUNTER PROVIDER: Phillip Tolliver MD        PRESENTATION:     CHIEF COMPLAINT(S): ADHD, anxiety    OVERVIEW OF THE HPI:    44M w/ h/o ADHD who presents for psych f/u    SUBJECTIVE/CURRENT FINDINGS:    - doing well, no big changes  - often forgets to take adderall on weekends  - practicing mindfulness, being present in moment (not meditation practice but shift in mindset), has been very helpful  - started this after watching [employer] documentary  - often staying up late playing games on phone or xbox  - no new concerns    REVIEW OF SYSTEMS:    >> SOURCES: patient     N   Sleep Disturbance/Disruption  no insomnia     N   Appetite/Weight Change   N   Alterations in Energy Level   Y   Impaired Focus/Concentration  course: chronic  improving  +diminished ability to think (when doesn't take adderall on weekends)     N   Depressive Symptomatology   N   Excessive  Anxiety/Worry  stress   N   Dysregulated Mood/Behavior  no irritability     N   Cardiopulmonary Symptoms    MEDICAL  Pertinent Positives/Negatives:    No CP or SOB.  No new physical health concerns.        Regarding the current presentation, no other significant issues or complaints are voiced or known at this time.       ADD-ON PSYCHOTHERAPY:     ADD-ON THERAPY     HISTORY:     HISTORY    Allergies:  Patient has no known allergies.     EXAMINATION:     VITALS:  There were no vitals taken for this visit.    MENTAL STATUS EXAMINATION:  Appearance: appears stated age  appropriately dressed, adequately groomed, in no apparent distress, well-appearing    Behavior & Attitude: participative, under adequate behavioral control  calm, engaged, agreeable, cooperative    Movements & Motor Activity:   unknown/unable to assess  Speech & Language: normal rate, normal volume, spontaneous, reciprocal, fluent    Mood: good  Affect: reactive  appropriate given the situation/context    Thought Process & Associations: linear, goal-directed, logical, coherent, relevant, abstract    Thought Content & Perceptions: no delusions, no paranoid ideation, no hallucinations    Sensorium: awake, alert, clear    Orientation: grossly intact, oriented to person, oriented to place, oriented to time, oriented to situation    Recent & Remote Memory: intact (recent), intact (remote)    Attention & Concentration: intact  attentive to conversation, not easily distracted    Fund of Knowledge: intact, vocabulary proficient    Insight: intact, good  demonstrates sufficient awareness of condition/situation    Judgment: intact, good  heeds instructions/advice             RISK & REGULATORY:      RISK PARAMETERS (current to the encounter/episode  NOT inclusive of past history):     N   Suicidal Ideation/Threats   N   Suicide Attempts/Gestures   N   Homicidal Ideation/Threats   N   Homicidal Behavior   N    Non-Suicidal Self-Injurious Behavior   N   Perpetrated Violence     REGULATORY:    -- : REVIEWED      INFORMED CONSENT & SHARED DECISION MAKING are the hallmark and bedrock of good clinical care, and as such have been employed and obtained, respectively, to the degree possible.  Discussed, to the extent possible, diagnosis, risks and benefits of proposed treatment (e.g., medication, therapy) vs alternative treatments vs no treatment, potential side effects of these treatments, and the inherent unpredictability of treatment.        WARNINGS & PRECAUTIONS:  >> In cases of emergencies (e.g. SI/HI resulting in danger to self or others, functioning deteriorating to the level of grave disability), call 911 or [phone], or present to the emergency department for immediate assistance.    >> Individuals should not operate a motor vehicle or heavy machinery if effects of medications or underlying symptoms/condition impair the ability to do so safely.    >> FULLY comply with ANY/ALL medication as prescribed/instructed and report ANY/ALL suspected adverse effects to appropriate health care providers.       ASSESSMENT & PLAN:     DIAGNOSES & PROBLEMS:       1.  ADHD, inattentive type  chronic stable    PSYCHOTROPIC REGIMEN:   (C)=Continue as prescribed  (A)=Adjust as noted  (I)=Iniitate  (D)=Discontinue      1.  Adderall ir 30 mg every morning (C)  RESPONSE: robust  ADHERENCE: adherent  SIDE EFFECTS: negligible    2.  Adderall ir 15 mg in afternoon (C)  RESPONSE: robust  ADHERENCE: adherent  SIDE EFFECTS: negligible    Encouraged to take meds on weekends as well    -- PLAN (goals  recommentations):     Completed course of therapy in [facility] clinic    Continue mindfulness    Sleep hygiene (no phone/screens)    RTC in 3 mo  Discussed resident transition    CHART REVIEW: available documentation has been reviewed, and pertinent elements of the chart have been incorporated into this evaluation where appropriate.        DIAGNOSTIC TESTING:      Glu *   *  Li *   *  TSH *   *    HgA1c *   *  VPA *   *   FT4 *   *    Na *   *  CLZ *   *  WBC *   *    Cr *   *  ANC *   *   Hgb *   *     BUN *   *  Trop I *   *  HCT *   *     GFR *   *   CPK *   *  PLT *   *     Alb *   *   PRL *   *  B12 *   *     T Bili *   *  Chol *   *  B9 *   *    ALP *   *  TGs *   *  B1 *   *    AST *   *  HDL *   *  Vit D *   *     ALT *   *  LDL *   *  HIV *   *     INR *   *  Ember *   *   Hep C *   *    GGT *   *  Lip *   *  RPR *   *    MCV *   *   NH4 *   *  UPT *   *      PETH *   *  THC *   *    ETOH *   *  EMMA *   *    EtG *   *  AMP *   *    ALC *   *  OPI *   *    BZO *   *  MTD *   *     BAR *   *  BUP *   *    PCP *   *  FEN *   *          KEY & LINKS:        Y  = yes/endorses     N  = no/denies     U  = unknown/unable to assess    ADHD   AIMS   AUDIT   AUDIT-C   C-SSRS (Screen)   C-SSRS (Short)   C-SSRS (Full)   DAST   DAST-10   PILAR-7   MoCA   PCL-5   PHQ-9   FERMIN   YMRS     Consults

## 2025-05-29 ENCOUNTER — OFFICE VISIT (OUTPATIENT)
Dept: INTERNAL MEDICINE | Facility: CLINIC | Age: 45
End: 2025-05-29
Payer: COMMERCIAL

## 2025-05-29 DIAGNOSIS — M54.9 BACK PAIN, UNSPECIFIED BACK LOCATION, UNSPECIFIED BACK PAIN LATERALITY, UNSPECIFIED CHRONICITY: Primary | ICD-10-CM

## 2025-05-29 DIAGNOSIS — R09.81 SINUS CONGESTION: ICD-10-CM

## 2025-05-29 DIAGNOSIS — K64.9 HEMORRHOIDS, UNSPECIFIED HEMORRHOID TYPE: ICD-10-CM

## 2025-05-29 DIAGNOSIS — Z00.00 PREVENTATIVE HEALTH CARE: ICD-10-CM

## 2025-05-29 PROCEDURE — 3008F BODY MASS INDEX DOCD: CPT | Mod: CPTII,S$GLB,, | Performed by: INTERNAL MEDICINE

## 2025-05-29 PROCEDURE — 1159F MED LIST DOCD IN RCRD: CPT | Mod: CPTII,S$GLB,, | Performed by: INTERNAL MEDICINE

## 2025-05-29 PROCEDURE — 3074F SYST BP LT 130 MM HG: CPT | Mod: CPTII,S$GLB,, | Performed by: INTERNAL MEDICINE

## 2025-05-29 PROCEDURE — 3079F DIAST BP 80-89 MM HG: CPT | Mod: CPTII,S$GLB,, | Performed by: INTERNAL MEDICINE

## 2025-05-29 PROCEDURE — 99999 PR PBB SHADOW E&M-EST. PATIENT-LVL V: CPT | Mod: PBBFAC,,, | Performed by: INTERNAL MEDICINE

## 2025-05-29 PROCEDURE — 99396 PREV VISIT EST AGE 40-64: CPT | Mod: S$GLB,,, | Performed by: INTERNAL MEDICINE

## 2025-06-01 VITALS
HEART RATE: 85 BPM | BODY MASS INDEX: 26.02 KG/M2 | HEIGHT: 69 IN | WEIGHT: 175.69 LBS | DIASTOLIC BLOOD PRESSURE: 86 MMHG | OXYGEN SATURATION: 99 % | TEMPERATURE: 99 F | SYSTOLIC BLOOD PRESSURE: 114 MMHG

## 2025-06-09 ENCOUNTER — TELEPHONE (OUTPATIENT)
Dept: SURGERY | Facility: CLINIC | Age: 45
End: 2025-06-09
Payer: COMMERCIAL

## 2025-06-27 ENCOUNTER — TELEPHONE (OUTPATIENT)
Dept: SURGERY | Facility: CLINIC | Age: 45
End: 2025-06-27
Payer: COMMERCIAL

## 2025-06-30 ENCOUNTER — TELEPHONE (OUTPATIENT)
Dept: OTOLARYNGOLOGY | Facility: CLINIC | Age: 45
End: 2025-06-30

## 2025-06-30 ENCOUNTER — OFFICE VISIT (OUTPATIENT)
Dept: OTOLARYNGOLOGY | Facility: CLINIC | Age: 45
End: 2025-06-30
Payer: COMMERCIAL

## 2025-06-30 VITALS
WEIGHT: 180.56 LBS | SYSTOLIC BLOOD PRESSURE: 137 MMHG | BODY MASS INDEX: 26.66 KG/M2 | DIASTOLIC BLOOD PRESSURE: 81 MMHG

## 2025-06-30 DIAGNOSIS — D14.0 PAPILLOMA OF VESTIBULE OF NOSE: ICD-10-CM

## 2025-06-30 DIAGNOSIS — J34.829 NASAL VALVE COLLAPSE: ICD-10-CM

## 2025-06-30 DIAGNOSIS — J30.1 NON-SEASONAL ALLERGIC RHINITIS DUE TO POLLEN: ICD-10-CM

## 2025-06-30 DIAGNOSIS — R09.81 SINUS CONGESTION: ICD-10-CM

## 2025-06-30 DIAGNOSIS — J34.2 DEVIATED NASAL SEPTUM: ICD-10-CM

## 2025-06-30 DIAGNOSIS — R09.81 SINUS CONGESTION: Primary | ICD-10-CM

## 2025-06-30 DIAGNOSIS — J34.89 NASAL OBSTRUCTION: ICD-10-CM

## 2025-06-30 DIAGNOSIS — J34.2 DEVIATED NASAL SEPTUM: Primary | ICD-10-CM

## 2025-06-30 PROCEDURE — 1160F RVW MEDS BY RX/DR IN RCRD: CPT | Mod: CPTII,S$GLB,, | Performed by: OTOLARYNGOLOGY

## 2025-06-30 PROCEDURE — 3079F DIAST BP 80-89 MM HG: CPT | Mod: CPTII,S$GLB,, | Performed by: OTOLARYNGOLOGY

## 2025-06-30 PROCEDURE — 99214 OFFICE O/P EST MOD 30 MIN: CPT | Mod: 25,S$GLB,, | Performed by: OTOLARYNGOLOGY

## 2025-06-30 PROCEDURE — 1159F MED LIST DOCD IN RCRD: CPT | Mod: CPTII,S$GLB,, | Performed by: OTOLARYNGOLOGY

## 2025-06-30 PROCEDURE — 31231 NASAL ENDOSCOPY DX: CPT | Mod: S$GLB,,, | Performed by: OTOLARYNGOLOGY

## 2025-06-30 PROCEDURE — 3075F SYST BP GE 130 - 139MM HG: CPT | Mod: CPTII,S$GLB,, | Performed by: OTOLARYNGOLOGY

## 2025-06-30 PROCEDURE — 3008F BODY MASS INDEX DOCD: CPT | Mod: CPTII,S$GLB,, | Performed by: OTOLARYNGOLOGY

## 2025-06-30 PROCEDURE — 99999 PR PBB SHADOW E&M-EST. PATIENT-LVL III: CPT | Mod: PBBFAC,,, | Performed by: OTOLARYNGOLOGY

## 2025-06-30 RX ORDER — AZELASTINE 1 MG/ML
1 SPRAY, METERED NASAL 2 TIMES DAILY
Qty: 30 ML | Refills: 11 | Status: SHIPPED | OUTPATIENT
Start: 2025-06-30 | End: 2026-06-30

## 2025-06-30 NOTE — PROGRESS NOTES
History of Present Illness:   Juan Manuel Hdz is a 44 y.o. year old male evaluated in the Otolaryngology-Head and Neck Surgery Clinic at [facility]. Patient is known to me from prior excision of a nasal septalpapilloma.  He was noted to have at that time nasal septal deviation and nasal obstruction.  At that time he was fairly controlled nasal steroids over the course of the past year or so he has had worsening with congestion rhinorrhea and obstruction.  He also complains of irritation from Flonase.  Right side is worse than the left.  He denies any prior nasal fractures.  He denies prior nasal surgery.      Prior HPI  The patient was referred by YVES Turner  for evaluation of nasal papilloma biopsy.  Patient reports he has had a lesion to the left side of the nostril that has been present for at least 1 year with slow growth.  Reports occasional bleeding.  He has nasal obstruction on that side.  He had no episodes of flare up with erythema of the skin or drainage in no known prior infections.  He has not sure but probably did manipulate the lesion at some point.           Past Medical/Surgical History  Past Medical History:   Diagnosis Date    Acute appendicitis 11/21/2017    ADHD (attention deficit hyperactivity disorder)     Allergy     seasonal    AR (allergic rhinitis)     History of psychiatric care     Psychiatric problem     Raynaud's disease without gangrene 2/14/2019    Reactive airway disease     Therapy      His  has a past surgical history that includes Closed reduction zygomatic arch fracture; Back surgery (2010); Appendectomy; and achilles tendon rupture (Right).     Past Family/Social History  His family history includes Autism in his brother; Autoimmune disease in his mother; Diabetes in his maternal grandfather and maternal grandmother; Heart disease in his father; Hyperlipidemia in his father; Lupus in his mother.  He  reports that he has been smoking vaping with nicotine. He has never  been exposed to tobacco smoke. He has never used smokeless tobacco. He reports current alcohol use of about 0.8 standard drinks of alcohol per week. He reports that he does not use drugs.     Medications/Allergies/Immunizations  His current medication(s) include:   Current Outpatient Medications   Medication Sig Dispense Refill    dextroamphetamine-amphetamine (ADDERALL) 30 mg Tab Take 1 tablet (30 mg) by mouth in the morning and ½ tablet (15 mg) in the afternoon 45 tablet 0    dextroamphetamine-amphetamine (ADDERALL) 30 mg Tab Take 1 tab (30 mg) in the morning and 1/2 tab (15 mg) in the afternoon by mouth 45 tablet 0    [START ON 7/27/2025] dextroamphetamine-amphetamine (ADDERALL) 30 mg Tab Take 1 tab (30 mg) in the morning and 1/2 tab (15 mg) in the afternoon by mouth 45 tablet 0    omeprazole (PRILOSEC) 40 MG capsule Take 1 capsule (40 mg total) by mouth every morning. 90 capsule 2    azelastine (ASTELIN) 137 mcg (0.1 %) nasal spray 1 spray (137 mcg total) by Nasal route 2 (two) times daily. 30 mL 11     No current facility-administered medications for this visit.        Allergies: Patient has no known allergies.     Immunizations:   Immunization History   Administered Date(s) Administered    COVID-19, vector-nr, rS-Ad26, PF (Laurita) 07/13/2021    Influenza - Quadrivalent - PF *Preferred* (6 months and older) 09/17/2019, 09/29/2021    Tdap 09/17/2019         Review of Systems   Constitutional: Negative for fever, weight loss and weight gain.  Skin: Negative for rash, itchiness, dryness  HENT:  As per HPI  Cardiovascular: Negative for chest pain and dyspnea on exertion .   Respiratory: Is not experiencing shortness of breath.   Gastrointestinal: Negative for nausea and vomiting.   Neurological: Negative for headaches.   Lymph/Heme: Negative for lymphadenopathy or easy bruising  Musculoskeletal: Negative for joint or muscle pain  Psychiatric: The patient is not nervous/anxious.        All other systems are  negative except for that listed in the HPI.      PHYSICAL EXAM:   Vital Signs:  /81   Wt 81.9 kg (180 lb 8.9 oz)   BMI 26.66 kg/m²      General:  Well-developed, well-nourished  Communication and Voice:  Clear pitch and clarity  Hearing: Hearing adequate for verbal communication bilaterally   Inspection:  Normocephalic and atraumatic without mass or lesion  Palpation:  Facial skeleton intact without bony stepoffs  Parotid Glands:  No mass or tenderness  Facial Strength:  Facial motility symmetric and full bilaterally  Pinna:  External ear intact and fully developed  External canal:  Canal is patent with intact skin  Tympanic Membrane:  Clear and mobile  External nose:  No scar or anatomic deformity   Notablevertical orientation of lower laterals. Zone 1 nasal valve collapse bilaterally which response to Lara and modified Lara maneuvers with significant improvement in breathing.  Internal Nose:  Septum intact with right-sided body deviation  Prior septal papilloma area healed completely. No edema, polyp, or rhinorrhea.  TMJ:  No pain to palpation with full mobility  Oral cavity, Lips, Teeth, and Gums:  Mucosa and teeth intact and viable, No lesions, masses or ulcers  Oropharynx: No erythema or exudate, no masses or ulcerations, non-obstructive tonsils  Nasopharynx:  No mass or lesion with intact mucosa  Hypopharynx:  Not well visualized secondary to gagging  Larynx:  Not well visualized secondary to gagging  Neck, Trachea, Lymphatics:  Midline trachea without mass or lesion, no lymphadenopathy  Thyroid:  No mass or nodularity  Eyes: No nystagmus with equal extraocular motion bilaterally  Neuro/Psych/Balance: Patient oriented and appropriate in interaction;  Appropriate mood and affect;  Gait is intact with no imbalance; Cranial nerves I-XII are intact  Respiratory effort:  Equal inspiration and expiration without stridor  Peripheral Vascular:  Warm extremities with equal pulses       Procedure: Rigid  endoscopic nasal exam   Surgeon: Jose Sosa MD  Procedure note/findings: After informed discussion of the risks, benefits, and alternatives after indications as noted above, nasal cavities were topically anesthetized and decongested with 4% lidocaine and Afrin solutions. A rigid 0 degree nasal endoscope was inserted into bilateral nasal cavities and the following was noted    Right:     Inferior turbinate with moderate hypertrophy middle turbinate normal without hypertrophy  The Osteomeatal complex including the middle and superior meatus does not show any polyps or lesions  The sphenoethmoid recess was clear    Left     Inferior turbinate with moderate hypertrophy middle turbinate normal without hypertrophy  The Osteomeatal complex including the middle and superior meatus does not show any polyps or lesions  The sphenoethmoid recess was clear    Nasopharynx, similarly, revealed no mass lesion or granularity. There was no ulceration. There was no gross asymmetry. Fossa of Rosenmueller was intact bilaterally. The eustachian tube orifices were intact bilaterally and patent.  The scope was then withdrawn and removed. He tolerated this well.              ASSESSMENT:   1. Sinus congestion    2. Deviated nasal septum    3. Papilloma of vestibule of nose    4. Non-seasonal allergic rhinitis due to pollen    5. Nasal valve collapse            PLAN:    Patient has significant nasal obstruction with septal deviation turbinate hypertrophy and nasal valve collapse.  Multifactorial nature of obstruction discussed.  Allergic component also discussed.   I will start him on Astelin as he is not tolerating Flonase. He has not had any relief with nasal steroids.  He would like to proceed with functional nasal surgery.  This will involve septoplasty turbinate reduction and Batten grafts.  Risks benefits and alternatives of the procedure discussed in detail.  Consent was obtained and signed.  He will be scheduled for August 5th  2025.    I believe that Mr. Hdz has a good understanding of the issues involved and I answered all of his questions.     DISCLAIMER: This note was prepared with [email or web address] voice recognition transcription software. Garbled syntax, mangled pronouns, and other bizarre constructions may be attributed to that software system. While efforts were made to correct any mistakes made by this voice recognition program, some errors and/or omissions may remain in the note that were missed when the note was originally created.

## 2025-07-09 ENCOUNTER — TELEPHONE (OUTPATIENT)
Dept: SURGERY | Facility: CLINIC | Age: 45
End: 2025-07-09

## 2025-08-19 ENCOUNTER — TELEPHONE (OUTPATIENT)
Dept: OTOLARYNGOLOGY | Facility: CLINIC | Age: 45
End: 2025-08-19
Payer: COMMERCIAL

## 2025-09-04 ENCOUNTER — OFFICE VISIT (OUTPATIENT)
Dept: ALLERGY | Facility: CLINIC | Age: 45
End: 2025-09-04
Payer: COMMERCIAL

## 2025-09-04 VITALS — BODY MASS INDEX: 26.64 KG/M2 | WEIGHT: 179.88 LBS | HEIGHT: 69 IN

## 2025-09-04 DIAGNOSIS — F90.0 ATTENTION DEFICIT HYPERACTIVITY DISORDER (ADHD), PREDOMINANTLY INATTENTIVE TYPE: ICD-10-CM

## 2025-09-04 DIAGNOSIS — K21.9 LARYNGOPHARYNGEAL REFLUX (LPR): ICD-10-CM

## 2025-09-04 DIAGNOSIS — J31.0 CHRONIC RHINITIS: ICD-10-CM

## 2025-09-04 DIAGNOSIS — J31.0 CHRONIC NONALLERGIC RHINITIS: Primary | ICD-10-CM

## 2025-09-04 PROCEDURE — 3008F BODY MASS INDEX DOCD: CPT | Mod: CPTII,S$GLB,, | Performed by: STUDENT IN AN ORGANIZED HEALTH CARE EDUCATION/TRAINING PROGRAM

## 2025-09-04 PROCEDURE — 99999 PR PBB SHADOW E&M-EST. PATIENT-LVL III: CPT | Mod: PBBFAC,,, | Performed by: STUDENT IN AN ORGANIZED HEALTH CARE EDUCATION/TRAINING PROGRAM

## 2025-09-04 PROCEDURE — 1160F RVW MEDS BY RX/DR IN RCRD: CPT | Mod: CPTII,S$GLB,, | Performed by: STUDENT IN AN ORGANIZED HEALTH CARE EDUCATION/TRAINING PROGRAM

## 2025-09-04 PROCEDURE — 95004 PERQ TESTS W/ALRGNC XTRCS: CPT | Mod: S$GLB,,, | Performed by: STUDENT IN AN ORGANIZED HEALTH CARE EDUCATION/TRAINING PROGRAM

## 2025-09-04 PROCEDURE — 1159F MED LIST DOCD IN RCRD: CPT | Mod: CPTII,S$GLB,, | Performed by: STUDENT IN AN ORGANIZED HEALTH CARE EDUCATION/TRAINING PROGRAM

## 2025-09-04 PROCEDURE — 99203 OFFICE O/P NEW LOW 30 MIN: CPT | Mod: 25,S$GLB,, | Performed by: STUDENT IN AN ORGANIZED HEALTH CARE EDUCATION/TRAINING PROGRAM

## 2025-09-04 RX ORDER — FLUTICASONE PROPIONATE 50 MCG
2 SPRAY, SUSPENSION (ML) NASAL 2 TIMES DAILY
Qty: 16 G | Refills: 5 | Status: SHIPPED | OUTPATIENT
Start: 2025-09-04

## (undated) DEVICE — TUBING HF INSUFFLATION W/ FLTR

## (undated) DEVICE — KIT ANTIFOG

## (undated) DEVICE — CART STAPLE FLEX ETX 3.5MM BLU

## (undated) DEVICE — TROCAR ENDOPATH XCEL 5MM 7.5CM

## (undated) DEVICE — SOL NS 1000CC

## (undated) DEVICE — BANDAGE ADHESIVE

## (undated) DEVICE — SUT 0 VICRYL / UR6 (J603)

## (undated) DEVICE — ELECTRODE REM PLYHSV RETURN 9

## (undated) DEVICE — TROCAR SPACEMAKER BLUNT 12MM

## (undated) DEVICE — DRAPE STERI INSTRUMENT 1018

## (undated) DEVICE — BAG TISS RETRV MONARCH 10MM

## (undated) DEVICE — CART STAPLE RELD 45MM WHT

## (undated) DEVICE — TRAY FOLEY 16FR INFECTION CONT

## (undated) DEVICE — SUT MCRYL PLUS 4-0 PS2 27IN

## (undated) DEVICE — BLADE SURG CARBON STEEL SZ11

## (undated) DEVICE — DRAPE ABDOMINAL TIBURON 14X11

## (undated) DEVICE — SCISSOR 5MMX35CM DIRECT DRIVE

## (undated) DEVICE — DISSECTOR 5MM ENDOPATH

## (undated) DEVICE — TRAY MINOR GEN SURG

## (undated) DEVICE — APPLICATOR CHLORAPREP ORN 26ML

## (undated) DEVICE — IRRIGATOR ENDOSCOPY DISP.

## (undated) DEVICE — SEE MEDLINE ITEM 152622

## (undated) DEVICE — SEE MEDLINE ITEM 157117

## (undated) DEVICE — STAPLER INT LINEAR ARTC 3.5-45